# Patient Record
Sex: MALE | Race: WHITE | NOT HISPANIC OR LATINO | Employment: STUDENT | URBAN - METROPOLITAN AREA
[De-identification: names, ages, dates, MRNs, and addresses within clinical notes are randomized per-mention and may not be internally consistent; named-entity substitution may affect disease eponyms.]

---
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
## 2018-04-12 DIAGNOSIS — F40.10 SOCIAL ANXIETY DISORDER: ICD-10-CM

## 2018-04-12 DIAGNOSIS — F41.1 GAD (GENERALIZED ANXIETY DISORDER): ICD-10-CM

## 2018-04-12 RX ORDER — FLUOXETINE HYDROCHLORIDE 40 MG/1
CAPSULE ORAL
Qty: 30 CAPSULE | Refills: 0 | OUTPATIENT
Start: 2018-04-12

## 2018-05-17 ENCOUNTER — OFFICE VISIT (OUTPATIENT)
Dept: PSYCHIATRY | Facility: CLINIC | Age: 18
End: 2018-05-17
Payer: COMMERCIAL

## 2018-05-17 VITALS
BODY MASS INDEX: 30.19 KG/M2 | HEIGHT: 70 IN | SYSTOLIC BLOOD PRESSURE: 143 MMHG | DIASTOLIC BLOOD PRESSURE: 65 MMHG | WEIGHT: 210.88 LBS | HEART RATE: 113 BPM

## 2018-05-17 DIAGNOSIS — F40.10 SOCIAL ANXIETY DISORDER: ICD-10-CM

## 2018-05-17 DIAGNOSIS — F84.0 AUTISM SPECTRUM DISORDER REQUIRING SUPPORT (LEVEL 1): Primary | ICD-10-CM

## 2018-05-17 DIAGNOSIS — F41.1 GAD (GENERALIZED ANXIETY DISORDER): ICD-10-CM

## 2018-05-17 PROCEDURE — 99999 PR PBB SHADOW E&M-EST. PATIENT-LVL II: CPT | Mod: PBBFAC,,, | Performed by: PSYCHIATRY & NEUROLOGY

## 2018-05-17 PROCEDURE — 99214 OFFICE O/P EST MOD 30 MIN: CPT | Mod: S$GLB,,, | Performed by: PSYCHIATRY & NEUROLOGY

## 2018-05-17 RX ORDER — FLUOXETINE HYDROCHLORIDE 40 MG/1
40 CAPSULE ORAL DAILY
Qty: 30 CAPSULE | Refills: 3 | Status: SHIPPED | OUTPATIENT
Start: 2018-05-17 | End: 2018-05-17 | Stop reason: SDUPTHER

## 2018-05-17 RX ORDER — LISDEXAMFETAMINE DIMESYLATE CAPSULES 20 MG/1
20 CAPSULE ORAL EVERY MORNING
Qty: 30 CAPSULE | Refills: 0 | Status: SHIPPED | OUTPATIENT
Start: 2018-05-17 | End: 2018-05-17 | Stop reason: SDUPTHER

## 2018-05-17 RX ORDER — FLUOXETINE HYDROCHLORIDE 40 MG/1
40 CAPSULE ORAL DAILY
Qty: 30 CAPSULE | Refills: 3 | Status: SHIPPED | OUTPATIENT
Start: 2018-05-17 | End: 2018-06-18 | Stop reason: SDUPTHER

## 2018-05-17 RX ORDER — LISDEXAMFETAMINE DIMESYLATE CAPSULES 20 MG/1
20 CAPSULE ORAL EVERY MORNING
Qty: 30 CAPSULE | Refills: 0 | Status: SHIPPED | OUTPATIENT
Start: 2018-06-16 | End: 2018-06-18 | Stop reason: SDUPTHER

## 2018-05-17 RX ORDER — LISDEXAMFETAMINE DIMESYLATE CAPSULES 20 MG/1
20 CAPSULE ORAL EVERY MORNING
Qty: 30 CAPSULE | Refills: 0 | Status: SHIPPED | OUTPATIENT
Start: 2018-06-16 | End: 2018-05-17 | Stop reason: SDUPTHER

## 2018-05-17 RX ORDER — LISDEXAMFETAMINE DIMESYLATE CAPSULES 20 MG/1
20 CAPSULE ORAL EVERY MORNING
Qty: 30 CAPSULE | Refills: 0 | Status: SHIPPED | OUTPATIENT
Start: 2018-05-17 | End: 2018-06-16

## 2018-05-17 NOTE — PROGRESS NOTES
"Outpatient Psychiatry Follow-Up Visit (MD/NP)    5/17/2018    Clinical Status of Patient:  Outpatient (Ambulatory)  IDENTIFYING DATA:  Child's Name: "Preethi Celaya III  Grade: freshmen year at [employer] in 2017-18 academic year  School: [employer]  currently lives with: mother Caroline Saavedra, older sisterRosa lives in the same house, but separate apartment and father and grandparents live elsewhere. All are involved in Leanna's care.Leanna was living in [place] for his first year of [employer] and has returned home for the summer.     Chief Complaint: Royal Yomi VAIL is a 18 y.o. male who presents today for follow-up of Problems with executive functioning and task completion, reading social cues and social relatedness, problems accepting personal responsibility, depressive and anxiety symptoms, R/o high-functioning Autism Spectrum Disorder. Met with patient and mother.    Interval History and Content of Current Session:  Interim Events/Subjective Report/Content of Current Session:     Psychotherapy:  · Target symptoms: distractability, lack of focus, anxiety , Problems with executive functioning and task completion, reading social cues and social relatedness, problems accepting personal responsibility, R/o high-functioning Autism Spectrum Disorder  · Why chosen therapy is appropriate versus another modality: relevant to diagnosis, patient responds to this modality, evidence based practice  · Outcome monitoring methods: self-report, observation, feedback from family  · Therapeutic intervention type: behavior modifying psychotherapy, supportive psychotherapy, medication management  · Topics discussed/themes: relationships difficulties, parenting issues, difficulty managing affect in interpersonal relationships, building skills sets for symptom management, symptom recognition, life stage transitional issues  · The patient's response to the intervention is accepting. The patient's progress " "toward treatment goals is good.   · Duration of intervention: 30 minutes.     Review of Systems   · PSYCHIATRIC: Pertinant items are noted in the narrative.  · CONSTITUTIONAL: No weight gain or loss.   · MUSCULOSKELETAL: No pain or stiffness of the joints.  · NEUROLOGIC: No weakness, sensory changes, seizures, confusion, memory loss, tremor or other abnormal movements.  · CARDIOVASCULAR: No tachycardia or chest pain.  · GASTROINTESTINAL: No nausea, vomiting, pain, constipation or diarrhea.     Past Medical, Family and Social History: The patient's past medical, family and social history have been reviewed and updated as appropriate within the electronic medical record - see encounter notes.     Compliance: yes     Side effects: None     Risk Parameters:  Patient reports no suicidal ideation  Patient reports no homicidal ideation  Patient reports no self-injurious behavior  Patient reports no violent behavior      Exam (detailed: at least 9 elements; comprehensive: all 15 elements)   Constitutional  Vitals:  Most recent vital signs, dated less than 90 days prior to this appointment, were reviewed.   Vitals:    05/17/18 1301   BP: (!) 143/65   Pulse: (!) 113   Weight: 95.7 kg (210 lb 13.9 oz)   Height: 5' 10" (1.778 m)        General:  unremarkable, age appropriate, casually dressed     Musculoskeletal  Muscle Strength/Tone:  no dyskinesia, no tremor, no tic   Gait & Station:  non-ataxic      Psychiatric  Speech:  no latency; no press, spontaneous   Mood & Affect:  euthymic  congruent and appropriate   Thought Process:  goal-directed   Associations:  intact   Thought Content:  normal, no suicidality, no homicidality, delusions, or paranoia   Insight:  has awareness of illness   Judgement: behavior is adequate to circumstances   Orientation:  grossly intact   Memory: intact for content of interview   Language: grossly intact   Attention Span & Concentration:  able to focus   Fund of Knowledge:  intact and appropriate " to age and level of education      Assessment and Diagnosis   Status/Progress: Based on the examination today, the patient's problem(s) is/are adequately but not ideally controlled.  New problems have not been presented today.   Co-morbidities, Diagnostic uncertainty and Lack of compliance are not complicating management of the primary condition.  There are no active rule-out diagnoses for this patient at this time.      General Impression: 17 yo male with ASD and executive functioning problems and task completion, reading social cues and social relatedness, problems accepting personal responsibility, depressive and anxiety symptoms      ICD-10-CM ICD-9-CM   1. Autism spectrum disorder requiring support (level 1) F84.0 299.00   2. HARITHA (generalized anxiety disorder) F41.1 300.02   3. Social anxiety disorder F40.10 300.23       Intervention/Counseling/Treatment Plan   · Medication Management: Continue current medications of Prozac 40 mg and Vyvanse now 20 mg daily.The risks and benefits of medication were discussed with the patient.  · Counseling provided with patient and caregiver as follows: importance of compliance with chosen treatment options was emphasized, risks and benefits of treatment options, including medications, were discussed with the patient    Return to Clinic: as scheduled, 6/18/18

## 2018-05-17 NOTE — LETTER
May 29, 2018      Maite Franks MD  [address]  [facility] [address]           [facility]  [address]  [facility] [address]  Phone: [phone]          Patient: Royal Yomi VAIL   MR Number: [id number]   YOB: 2000   Date of Visit: 5/17/2018       Dear Dr. Maite Franks:    Thank you for referring Royal Celaya to me for evaluation. Attached you will find relevant portions of my assessment and plan of care.    If you have questions, please do not hesitate to call me. I look forward to following Royal Celaya along with you.    Sincerely,    Zoe Rocha MD    Enclosure  CC:  No Recipients    If you would like to receive this communication electronically, please contact [email or web address] or [phone] to request more information on [employer] access.    For providers and/or their staff who would like to refer a patient to [facility], please contact us through our one-stop-shop provider referral line, [facility], at [phone].    If you feel you have received this communication in error or would no longer like to receive these types of communications, please e-mail [email or web address]

## 2018-06-14 NOTE — PROGRESS NOTES
"Outpatient Psychiatry Follow-Up Visit (MD/NP)    6/18/2018    Clinical Status of Patient:  Outpatient (Ambulatory)  IDENTIFYING DATA:that caused him   Child's Name: "Preethi Celaya III  Grade: sophmore year at [employer] in 2018-19 academic year  School: [email or web address]  currently lives with: mother Caroline Saavedra, older sisterRosa lives in the same house, but separate apartment and father and grandparents live elsewhere. All are involved in Leanna's care.Leanna was living in [place] for his first year of [employer] and has returned home for the summer.     Chief Complaint: Royal Yomi VAIL is a 18 y.o. male who presents today for follow-up of Problems with executive functioning and task completion, reading social cues and social relatedness, problems accepting personal responsibility, depressive and anxiety symptoms, R/o high-functioning Autism Spectrum Disorder. Met with patient and mother.    Interval History and Content of Current Session:  Interim Events/Subjective Report/Content of Current Session: Leanna arrives on time and accompanied by his mother who does not join us for today's session. We continue our discussion of the two incidents with women which caused him some anxiety.     Psychotherapy:  · Target symptoms: distractability, lack of focus, anxiety , Problems with executive functioning and task completion, reading social cues and social relatedness, problems accepting personal responsibility, R/o high-functioning Autism Spectrum Disorder  · Why chosen therapy is appropriate versus another modality: relevant to diagnosis, patient responds to this modality, evidence based practice  · Outcome monitoring methods: self-report, observation, feedback from family  · Therapeutic intervention type: behavior modifying psychotherapy, supportive psychotherapy, medication management  · Topics discussed/themes: relationships difficulties, parenting issues, difficulty managing affect in " "interpersonal relationships, building skills sets for symptom management, symptom recognition, life stage transitional issues  · The patient's response to the intervention is accepting. The patient's progress toward treatment goals is good.   · Duration of intervention: 30 minutes.     Review of Systems   · PSYCHIATRIC: Pertinant items are noted in the narrative.  · CONSTITUTIONAL: No weight gain or loss.   · MUSCULOSKELETAL: No pain or stiffness of the joints.  · NEUROLOGIC: No weakness, sensory changes, seizures, confusion, memory loss, tremor or other abnormal movements.  · CARDIOVASCULAR: No tachycardia or chest pain.  · GASTROINTESTINAL: No nausea, vomiting, pain, constipation or diarrhea.     Past Medical, Family and Social History: The patient's past medical, family and social history have been reviewed and updated as appropriate within the electronic medical record - see encounter notes.     Compliance: yes     Side effects: None     Risk Parameters:  Patient reports no suicidal ideation  Patient reports no homicidal ideation  Patient reports no self-injurious behavior  Patient reports no violent behavior      Exam (detailed: at least 9 elements; comprehensive: all 15 elements)   Constitutional  Vitals:  Most recent vital signs, dated less than 90 days prior to this appointment, were reviewed.   Vitals:    06/18/18 1628   BP: (!) 147/70   Pulse: 106   Weight: 99.9 kg (220 lb 3.8 oz)   Height: 5' 10" (1.778 m)        General:  unremarkable, age appropriate, casually dressed     Musculoskeletal  Speech:  no latency; no press, spontaneous   Mood & Affect:  euthymic  congruent and appropriate   Thought Process:  goal-directed   Associations:  intact   Thought Content:  normal, no suicidality, no homicidality, delusions, or paranoia   Insight:  has awareness of illness   Judgement: behavior is adequate to circumstances   Orientation:  grossly intact   Memory: intact for content of interview   Language: grossly " intact   Attention Span & Concentration:  able to focus   Fund of Knowledge:  intact and appropriate to age and level of education      Assessment and Diagnosis   Status/Progress: Based on the examination today, the patient's problems are adequately but not ideally controlled.  New problems have not been presented today.   Co-morbidities, Diagnostic uncertainty and Lack of compliance are not complicating management of the primary condition.  There are no active rule-out diagnoses for this patient at this time.      General Impression: 19 yo male with ASD and executive functioning problems and task completion, reading social cues and social relatedness, problems accepting personal responsibility, depressive and anxiety symptoms      ICD-10-CM ICD-9-CM   1. ADHD (attention deficit hyperactivity disorder), inattentive type F90.0 314.00   2. HARITHA (generalized anxiety disorder) F41.1 300.02   3. Social anxiety disorder F40.10 300.23   4. Autism spectrum disorder requiring support (level 1) F84.0 299.00       Intervention/Counseling/Treatment Plan    Medication Management: Continue current medications of Prozac 40 mg and Vyvanse now 20 mg daily.The risks and benefits of medication were discussed with the patient.   Counseling provided with patient and caregiver as follows: importance of compliance with chosen treatment options was emphasized, risks and benefits of treatment options, including medications, were discussed with the patient    Return to Clinic: as scheduled, 7/31/18 at 10:30 am.

## 2018-06-18 ENCOUNTER — OFFICE VISIT (OUTPATIENT)
Dept: PSYCHIATRY | Facility: CLINIC | Age: 18
End: 2018-06-18
Payer: COMMERCIAL

## 2018-06-18 VITALS
DIASTOLIC BLOOD PRESSURE: 70 MMHG | SYSTOLIC BLOOD PRESSURE: 147 MMHG | BODY MASS INDEX: 31.53 KG/M2 | HEIGHT: 70 IN | HEART RATE: 106 BPM | WEIGHT: 220.25 LBS

## 2018-06-18 DIAGNOSIS — F90.0 ADHD (ATTENTION DEFICIT HYPERACTIVITY DISORDER), INATTENTIVE TYPE: Primary | ICD-10-CM

## 2018-06-18 DIAGNOSIS — F40.10 SOCIAL ANXIETY DISORDER: ICD-10-CM

## 2018-06-18 DIAGNOSIS — F84.0 AUTISM SPECTRUM DISORDER REQUIRING SUPPORT (LEVEL 1): ICD-10-CM

## 2018-06-18 DIAGNOSIS — F41.1 GAD (GENERALIZED ANXIETY DISORDER): ICD-10-CM

## 2018-06-18 PROCEDURE — 99214 OFFICE O/P EST MOD 30 MIN: CPT | Mod: S$GLB,,, | Performed by: PSYCHIATRY & NEUROLOGY

## 2018-06-18 PROCEDURE — 99999 PR PBB SHADOW E&M-EST. PATIENT-LVL II: CPT | Mod: PBBFAC,,, | Performed by: PSYCHIATRY & NEUROLOGY

## 2018-06-18 RX ORDER — FLUOXETINE HYDROCHLORIDE 40 MG/1
40 CAPSULE ORAL DAILY
Qty: 30 CAPSULE | Refills: 3 | Status: SHIPPED | OUTPATIENT
Start: 2018-06-18 | End: 2018-08-21 | Stop reason: SDUPTHER

## 2018-06-18 RX ORDER — LISDEXAMFETAMINE DIMESYLATE CAPSULES 20 MG/1
20 CAPSULE ORAL EVERY MORNING
Qty: 30 CAPSULE | Refills: 0 | Status: SHIPPED | OUTPATIENT
Start: 2018-06-18 | End: 2018-06-18 | Stop reason: SDUPTHER

## 2018-06-18 RX ORDER — LISDEXAMFETAMINE DIMESYLATE CAPSULES 20 MG/1
20 CAPSULE ORAL EVERY MORNING
Qty: 30 CAPSULE | Refills: 0 | Status: SHIPPED | OUTPATIENT
Start: 2018-07-17 | End: 2018-08-16

## 2018-06-18 NOTE — LETTER
June 25, 2018      Maite Franks MD  [address]  [facility] [address]           [facility]  [address]  [facility] [address]  Phone: [phone]          Patient: Royal Yomi VAIL   MR Number: [id number]   YOB: 2000   Date of Visit: 6/18/2018       Dear Dr. Maite Franks:    Thank you for referring Royal Celaya to me for evaluation. Attached you will find relevant portions of my assessment and plan of care.    If you have questions, please do not hesitate to call me. I look forward to following Royal Celaya along with you.    Sincerely,    Zoe Rocha MD    Enclosure  CC:  No Recipients    If you would like to receive this communication electronically, please contact [email or web address] or [phone] to request more information on [facility] Link access.    For providers and/or their staff who would like to refer a patient to [facility], please contact us through our one-stop-shop provider referral line, [facility], at [phone].    If you feel you have received this communication in error or would no longer like to receive these types of communications, please e-mail [email or web address]

## 2018-07-31 ENCOUNTER — OFFICE VISIT (OUTPATIENT)
Dept: PSYCHIATRY | Facility: CLINIC | Age: 18
End: 2018-07-31
Payer: COMMERCIAL

## 2018-07-31 VITALS
DIASTOLIC BLOOD PRESSURE: 70 MMHG | HEIGHT: 70 IN | WEIGHT: 230.19 LBS | SYSTOLIC BLOOD PRESSURE: 129 MMHG | HEART RATE: 94 BPM | BODY MASS INDEX: 32.95 KG/M2

## 2018-07-31 DIAGNOSIS — F40.10 SOCIAL ANXIETY DISORDER: ICD-10-CM

## 2018-07-31 DIAGNOSIS — F90.0 ADHD (ATTENTION DEFICIT HYPERACTIVITY DISORDER), INATTENTIVE TYPE: ICD-10-CM

## 2018-07-31 DIAGNOSIS — F84.0 AUTISM SPECTRUM DISORDER REQUIRING SUPPORT (LEVEL 1): Primary | ICD-10-CM

## 2018-07-31 DIAGNOSIS — F41.1 GAD (GENERALIZED ANXIETY DISORDER): ICD-10-CM

## 2018-07-31 DIAGNOSIS — F52.32 ANORGASMIA OF MALE: ICD-10-CM

## 2018-07-31 PROCEDURE — 99999 PR PBB SHADOW E&M-EST. PATIENT-LVL II: CPT | Mod: PBBFAC,,, | Performed by: PSYCHIATRY & NEUROLOGY

## 2018-07-31 PROCEDURE — 99214 OFFICE O/P EST MOD 30 MIN: CPT | Mod: S$GLB,,, | Performed by: PSYCHIATRY & NEUROLOGY

## 2018-07-31 NOTE — LETTER
August 5, 2018      Maite Franks MD  [address]  [facility] [address]           [facility]  [address]  [facility] [address]  Phone: [phone]          Patient: Royal Yomi VAIL   MR Number: [id number]   YOB: 2000   Date of Visit: 7/31/2018       Dear Dr. Maite Franks:    Thank you for referring Royal Celaya to me for evaluation. Attached you will find relevant portions of my assessment and plan of care.    If you have questions, please do not hesitate to call me. I look forward to following Royal Celaya along with you.    Sincerely,    Zoe Rocha MD    Enclosure  CC:  No Recipients    If you would like to receive this communication electronically, please contact [email or web address] or [phone] to request more information on [facility] Link access.    For providers and/or their staff who would like to refer a patient to [facility], please contact us through our one-stop-shop provider referral line, [facility], at [phone].    If you feel you have received this communication in error or would no longer like to receive these types of communications, please e-mail [email or web address]

## 2018-07-31 NOTE — PROGRESS NOTES
"Outpatient Psychiatry Follow-Up Visit (MD/NP)    7/31/2018    Clinical Status of Patient:  Outpatient (Ambulatory)  IDENTIFYING DATA:that caused him   Child's Name: "Preethi Celaya III  Grade: sophmore year at [employer] in 2018-19 academic year  School: [employer]  currently lives with: mother Caroline Saavedra, older sisteroRsa lives in the same house, but separate apartment and father and grandparents live elsewhere. All are involved in Leanna's care.Leanna was living in [place] for his first year of [employer] and has returned home for the summer.     Chief Complaint: Royal Yomi VAIL is a 18 y.o. male who presents today for follow-up of Problems with executive functioning and task completion, reading social cues and social relatedness, problems accepting personal responsibility, depressive and anxiety symptoms, R/o high-functioning Autism Spectrum Disorder. Met with patient and mother.    Interval History and Content of Current Session:  Interim Events/Subjective Report/Content of Current Session:     Date Severity measure for SAD BDI   7/31/18 10/40 9/33             Psychotherapy:  · Target symptoms: distractability, lack of focus, anxiety , Problems with executive functioning and task completion, reading social cues and social relatedness, problems accepting personal responsibility, R/o high-functioning Autism Spectrum Disorder  · Why chosen therapy is appropriate versus another modality: relevant to diagnosis, patient responds to this modality, evidence based practice  · Outcome monitoring methods: self-report, observation, feedback from family  · Therapeutic intervention type: behavior modifying psychotherapy, supportive psychotherapy, medication management  · Topics discussed/themes: relationships difficulties, parenting issues, difficulty managing affect in interpersonal relationships, building skills sets for symptom management, symptom recognition, life stage transitional " "issues  · The patient's response to the intervention is accepting. The patient's progress toward treatment goals is good.   · Duration of intervention: 30 minutes.     Review of Systems   · PSYCHIATRIC: Pertinant items are noted in the narrative.  · CONSTITUTIONAL: No weight gain or loss.   · MUSCULOSKELETAL: No pain or stiffness of the joints.  · NEUROLOGIC: No weakness, sensory changes, seizures, confusion, memory loss, tremor or other abnormal movements.  · CARDIOVASCULAR: No tachycardia or chest pain.  · GASTROINTESTINAL: No nausea, vomiting, pain, constipation or diarrhea.     Past Medical, Family and Social History: The patient's past medical, family and social history have been reviewed and updated as appropriate within the electronic medical record - see encounter notes.     Compliance: yes     Side effects: None     Risk Parameters:  Patient reports no suicidal ideation  Patient reports no homicidal ideation  Patient reports no self-injurious behavior  Patient reports no violent behavior      Exam (detailed: at least 9 elements; comprehensive: all 15 elements)   Constitutional  Vitals:  Most recent vital signs, dated less than 90 days prior to this appointment, were reviewed.   Vitals:    07/31/18 1025   BP: 129/70   Pulse: 94   Weight: 104.4 kg (230 lb 2.6 oz)   Height: 5' 10" (1.778 m)        General:  unremarkable, age appropriate, casually dressed     Musculoskeletal  Muscle Strength/Tone:  no dyskinesia, no tremor, no tic   Gait & Station:  non-ataxic     Psychiatric  Speech:  no latency; no press, spontaneous   Mood & Affect:  euthymic  congruent and appropriate   Thought Process:  goal-directed   Associations:  intact   Thought Content:  normal, no suicidality, no homicidality, delusions, or paranoia   Insight:  has awareness of illness   Judgement: behavior is adequate to circumstances   Orientation:  grossly intact   Memory: intact for content of interview   Language: grossly intact   Attention " Span & Concentration:  able to focus   Fund of Knowledge:  intact and appropriate to age and level of education      Assessment and Diagnosis   Status/Progress: Based on the examination today, the patient's problems are adequately but not ideally controlled.  New problems have not been presented today.   Co-morbidities, Diagnostic uncertainty and Lack of compliance are not complicating management of the primary condition.  There are no active rule-out diagnoses for this patient at this time.      General Impression: 17 yo male with ASD and executive functioning problems and task completion, reading social cues and social relatedness, problems accepting personal responsibility, depressive and anxiety symptoms      ICD-10-CM ICD-9-CM   1. Autism spectrum disorder requiring support (level 1) F84.0 299.00   2. HARITHA (generalized anxiety disorder) F41.1 300.02   3. Social anxiety disorder F40.10 300.23   4. ADHD (attention deficit hyperactivity disorder), inattentive type F90.0 314.00   5. 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  · Counseling provided with patient and caregiver as follows: importance of compliance with chosen treatment options was emphasized, risks and benefits of treatment options, including medications, were discussed with the patient    Return to Clinic: 8/21/18 at 5pm

## 2018-08-20 ENCOUNTER — PATIENT MESSAGE (OUTPATIENT)
Dept: PSYCHIATRY | Facility: CLINIC | Age: 18
End: 2018-08-20

## 2018-08-21 ENCOUNTER — OFFICE VISIT (OUTPATIENT)
Dept: PSYCHIATRY | Facility: CLINIC | Age: 18
End: 2018-08-21
Payer: COMMERCIAL

## 2018-08-21 DIAGNOSIS — F90.0 ADHD (ATTENTION DEFICIT HYPERACTIVITY DISORDER), INATTENTIVE TYPE: ICD-10-CM

## 2018-08-21 DIAGNOSIS — F84.0 AUTISM SPECTRUM DISORDER REQUIRING SUPPORT (LEVEL 1): Primary | ICD-10-CM

## 2018-08-21 DIAGNOSIS — F41.1 GAD (GENERALIZED ANXIETY DISORDER): ICD-10-CM

## 2018-08-21 DIAGNOSIS — F52.32 ANORGASMIA OF MALE: ICD-10-CM

## 2018-08-21 PROCEDURE — 99214 OFFICE O/P EST MOD 30 MIN: CPT | Mod: S$GLB,,, | Performed by: PSYCHIATRY & NEUROLOGY

## 2018-08-21 PROCEDURE — 99999 PR PBB SHADOW E&M-EST. PATIENT-LVL I: CPT | Mod: PBBFAC,,, | Performed by: PSYCHIATRY & NEUROLOGY

## 2018-08-21 RX ORDER — FLUOXETINE HYDROCHLORIDE 40 MG/1
40 CAPSULE ORAL DAILY
Qty: 90 CAPSULE | Refills: 1 | Status: SHIPPED | OUTPATIENT
Start: 2018-08-21 | End: 2020-03-11

## 2018-08-21 RX ORDER — LISDEXAMFETAMINE DIMESYLATE CAPSULES 20 MG/1
20 CAPSULE ORAL EVERY MORNING
Qty: 30 CAPSULE | Refills: 0 | Status: SHIPPED | OUTPATIENT
Start: 2018-08-24 | End: 2018-09-23

## 2018-08-21 RX ORDER — LORAZEPAM 0.5 MG/1
0.5 TABLET ORAL EVERY 4 HOURS PRN
Qty: 60 TABLET | Refills: 1 | Status: SHIPPED | OUTPATIENT
Start: 2018-08-21 | End: 2021-05-17

## 2018-08-21 RX ORDER — LISDEXAMFETAMINE DIMESYLATE CAPSULES 20 MG/1
20 CAPSULE ORAL EVERY MORNING
Qty: 30 CAPSULE | Refills: 0 | Status: SHIPPED | OUTPATIENT
Start: 2018-10-20 | End: 2019-05-16

## 2018-08-21 RX ORDER — LISDEXAMFETAMINE DIMESYLATE CAPSULES 20 MG/1
20 CAPSULE ORAL EVERY MORNING
Qty: 30 CAPSULE | Refills: 0 | Status: SHIPPED | OUTPATIENT
Start: 2018-09-22 | End: 2018-10-22

## 2018-08-21 NOTE — LETTER
August 27, 2018      Maite Franks MD  [address]  [facility] [address]           [facility]  [address]  [facility] [address]  Phone: [phone]          Patient: Royal Yomi VAIL   MR Number: [id number]   YOB: 2000   Date of Visit: 8/21/2018       Dear Dr. Maite Franks:    Thank you for referring Royal Celaya to me for evaluation. Attached you will find relevant portions of my assessment and plan of care.    If you have questions, please do not hesitate to call me. I look forward to following Royal Celaya along with you.    Sincerely,    Zoe Rocha MD    Enclosure  CC:  No Recipients    If you would like to receive this communication electronically, please contact [email or web address] or [phone] to request more information on [facility] Link access.    For providers and/or their staff who would like to refer a patient to [facility], please contact us through our one-stop-shop provider referral line, [facility], at [phone].    If you feel you have received this communication in error or would no longer like to receive these types of communications, please e-mail [email or web address]

## 2018-08-21 NOTE — PROGRESS NOTES
"Outpatient Psychiatry Follow-Up Visit (MD/NP)    8/21/2018    Clinical Status of Patient:  Outpatient (Ambulatory)  IDENTIFYING DATA:   Child's Name: "Preethi Celaya III  Grade: sophmore year at [employer] in 2018-19 academic year  School: [employer]  currently lives with: mother Caroline Saavedra, older sisterRosa lives in the same house, but separate apartment and father and grandparents live elsewhere. All are involved in Leanna's care.Leanna was living in [place] for his first year of [employer] and has returned home for the summer.     Chief Complaint: Royal Yomi VAIL is a 18 y.o. male who presents today for follow-up of Problems with executive functioning and task completion, reading social cues and social relatedness, problems accepting personal responsibility, depressive and anxiety symptoms, R/o high-functioning Autism Spectrum Disorder. Met with patient and mother      Interval History and Content of Current Session:  Interim Events/Subjective Report/Content of Current Session: Leanna arrives on time and accompanied by his mother who joins us for the session. Leanna had sent me a form requesting accommodations for a single dorm room for this academic year which I completed. We also discussed a recent episode of performance anxiey that he experienced when he was playing a solo in a performance and felt that he wasn't given sufficient time to prepare. He was refusing to perform the morning of hte performance and his mother called hi father who actually has his own anxiety problems and had some Ativan 0.5 mg on had and administered a dose to Leanna in the early afternoon and then shortly before the performance. His mother reports that the performance went off flawlessly. Alfredo is currently having a lot of anxiety about his ability to get a seat in the performance of [employer] in the performance at the [employer] this fall. He is concerned that even though his been " practicing madly for the opportunity to get selected that he wiol be overlooked due to political considerations that would provide the opportunity to upper classman or professorial favorites. We discuss how politics is part of human nature and  is something we all deal with.We caution Leanna that his negative thinking concerning his possibilities of achieving this opportunity could negatively impact his functioning. Since Leanna has had a positive experience is using Ativan at the 0.5 mg dose we will prescribe this medications for his use in similar situations. We caution Leanna and his mother about the addictive potential of Ativan and that it should be used sparingly in cases of panic attacks and severe performance anxiety. Leanna will be returning to [place] on Friday and we have sent a 3 month supply of his prescriptions to the [facility] on [address]. Leanna also reported that he did not get a call from urology concerning a consultation secondary to anorgasmia and asks if I can make a referral recommendation in [facility]. I do not know any urologist personally, but I can either refer him to a urologist at [facility] or [facility] and he would have to see if his insurance cover the provider. Leanna also reports he gets his primary healthcare through [facility] and I said I would look into making a referral to a urologist there if they had specialty practice providers.I attempted to make a consultation appointment in [place] for Leanna. However I was told that [facility] only takes internal referrals.The phone mumber there to make an appointment is [phone].There are also urologists at [facility],  Dr. Eric Rasmussen  [phone]  and at [facility] and Silverio Simeon -[phone]. Both providers take [facility] and Dr. Simeon's next appointment is Sept. 17th and you can make the appointment online  ([email or web address]).      Psychotherapy:  · Target symptoms: distractability, lack of focus, anxiety , Problems with executive functioning and task completion, reading social cues and social relatedness, problems accepting personal responsibility, R/o high-functioning Autism Spectrum Disorder  · Why chosen therapy is appropriate versus another modality: relevant to diagnosis, patient responds to this modality, evidence based practice  · Outcome monitoring methods: self-report, observation, feedback from family  · Therapeutic intervention type: behavior modifying psychotherapy, supportive psychotherapy, medication management  · Topics discussed/themes: relationships difficulties, parenting issues, difficulty managing affect in interpersonal relationships, building skills sets for symptom management, symptom recognition, life stage transitional issues  · The patient's response to the intervention is accepting. The patient's progress toward treatment goals is good.   · Duration of intervention: 30 minutes.     Review of Systems   · PSYCHIATRIC: Pertinant items are noted in the narrative.  · CONSTITUTIONAL: No weight gain or loss.   · MUSCULOSKELETAL: No pain or stiffness of the joints.  · NEUROLOGIC: No weakness, sensory changes, seizures, confusion, memory loss, tremor or other abnormal movements.  · CARDIOVASCULAR: No tachycardia or chest pain.  · GASTROINTESTINAL: No nausea, vomiting, pain, constipation or diarrhea.     Past Medical, Family and Social History: The patient's past medical, family and social history have been reviewed and updated as appropriate within the electronic medical record - see encounter notes.     Compliance: yes     Side effects: None     Risk Parameters:  Patient reports no suicidal ideation  Patient reports no homicidal ideation  Patient reports no self-injurious behavior  Patient  reports no violent behavior      Exam (detailed: at least 9 elements; comprehensive: all 15 elements)   Constitutional  Vitals:  Most recent vital signs, dated less than 90 days prior to this appointment, were reviewed.   There were no vitals filed for this visit.     General:  unremarkable, age appropriate, casually dressed     Musculoskeletal  Muscle Strength/Tone:  no dyskinesia, no tremor, no tic   Gait & Station:  non-ataxic      Psychiatric  Speech:  no latency; no press, spontaneous   Mood & Affect:  euthymic  congruent and appropriate   Thought Process:  goal-directed   Associations:  intact   Thought Content:  normal, no suicidality, no homicidality, delusions, or paranoia   Insight:  has awareness of illness   Judgement: behavior is adequate to circumstances   Orientation:  grossly intact   Memory: intact for content of interview   Language: grossly intact   Attention Span & Concentration:  able to focus   Fund of Knowledge:  intact and appropriate to age and level of education      Assessment and Diagnosis   Status/Progress: Based on the examination today, the patient's problems are adequately but not ideally controlled.  New problems have not been presented today.   Co-morbidities, Diagnostic uncertainty and Lack of compliance are not complicating management of the primary condition.  There are no active rule-out diagnoses for this patient at this time.      General Impression: 17 yo male with ASD and executive functioning problems and task completion, reading social cues and social relatedness, problems accepting personal responsibility, depressive and anxiety symptoms      ICD-10-CM ICD-9-CM   1. Autism spectrum disorder requiring support (level 1) F84.0 299.00   2. HARITHA (generalized anxiety disorder) F41.1 300.02   3. ADHD (attention deficit hyperactivity disorder), inattentive type F90.0 314.00   4. 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  · Referral to [facility] urology for anorgasmia.  · Counseling provided with patient and caregiver as follows: importance of compliance with chosen treatment options was emphasized, risks and benefits of treatment options, including medications, were discussed with the patient    Return to Clinic: 3 months

## 2018-08-23 ENCOUNTER — PATIENT MESSAGE (OUTPATIENT)
Dept: PSYCHIATRY | Facility: CLINIC | Age: 18
End: 2018-08-23

## 2018-12-12 ENCOUNTER — PATIENT MESSAGE (OUTPATIENT)
Dept: INTERNAL MEDICINE | Facility: CLINIC | Age: 18
End: 2018-12-12

## 2019-02-27 ENCOUNTER — TELEPHONE (OUTPATIENT)
Dept: ADMINISTRATIVE | Facility: HOSPITAL | Age: 19
End: 2019-02-27

## 2019-02-27 DIAGNOSIS — Z00.00 ANNUAL PHYSICAL EXAM: Primary | ICD-10-CM

## 2019-03-04 ENCOUNTER — OFFICE VISIT (OUTPATIENT)
Dept: OPTOMETRY | Facility: CLINIC | Age: 19
End: 2019-03-04
Payer: COMMERCIAL

## 2019-03-04 ENCOUNTER — LAB VISIT (OUTPATIENT)
Dept: LAB | Facility: HOSPITAL | Age: 19
End: 2019-03-04
Attending: INTERNAL MEDICINE
Payer: COMMERCIAL

## 2019-03-04 DIAGNOSIS — H43.393 VISUAL FLOATERS, BILATERAL: Primary | ICD-10-CM

## 2019-03-04 DIAGNOSIS — H52.12 MYOPIA WITH ASTIGMATISM, LEFT: ICD-10-CM

## 2019-03-04 DIAGNOSIS — H52.202 MYOPIA WITH ASTIGMATISM, LEFT: ICD-10-CM

## 2019-03-04 DIAGNOSIS — Z00.00 ANNUAL PHYSICAL EXAM: ICD-10-CM

## 2019-03-04 DIAGNOSIS — H52.11 MYOPIA, RIGHT EYE: ICD-10-CM

## 2019-03-04 LAB
ALBUMIN SERPL BCP-MCNC: 4.4 G/DL
ALP SERPL-CCNC: 63 U/L
ALT SERPL W/O P-5'-P-CCNC: 67 U/L
ANION GAP SERPL CALC-SCNC: 8 MMOL/L
AST SERPL-CCNC: 33 U/L
BASOPHILS # BLD AUTO: 0.04 K/UL
BASOPHILS NFR BLD: 0.5 %
BILIRUB SERPL-MCNC: 0.4 MG/DL
BUN SERPL-MCNC: 13 MG/DL
CALCIUM SERPL-MCNC: 9.7 MG/DL
CHLORIDE SERPL-SCNC: 105 MMOL/L
CHOLEST SERPL-MCNC: 152 MG/DL
CHOLEST/HDLC SERPL: 3.2 {RATIO}
CO2 SERPL-SCNC: 25 MMOL/L
CREAT SERPL-MCNC: 0.8 MG/DL
DIFFERENTIAL METHOD: ABNORMAL
EOSINOPHIL # BLD AUTO: 0.2 K/UL
EOSINOPHIL NFR BLD: 2.2 %
ERYTHROCYTE [DISTWIDTH] IN BLOOD BY AUTOMATED COUNT: 13.4 %
EST. GFR  (AFRICAN AMERICAN): >60 ML/MIN/1.73 M^2
EST. GFR  (NON AFRICAN AMERICAN): >60 ML/MIN/1.73 M^2
ESTIMATED AVG GLUCOSE: 100 MG/DL
GLUCOSE SERPL-MCNC: 87 MG/DL
HBA1C MFR BLD HPLC: 5.1 %
HCT VFR BLD AUTO: 42.9 %
HDLC SERPL-MCNC: 48 MG/DL
HDLC SERPL: 31.6 %
HGB BLD-MCNC: 13.9 G/DL
LDLC SERPL CALC-MCNC: 87.4 MG/DL
LYMPHOCYTES # BLD AUTO: 1.9 K/UL
LYMPHOCYTES NFR BLD: 25.6 %
MCH RBC QN AUTO: 27.8 PG
MCHC RBC AUTO-ENTMCNC: 32.4 G/DL
MCV RBC AUTO: 86 FL
MONOCYTES # BLD AUTO: 0.6 K/UL
MONOCYTES NFR BLD: 8.6 %
NEUTROPHILS # BLD AUTO: 4.4 K/UL
NEUTROPHILS NFR BLD: 60.2 %
NONHDLC SERPL-MCNC: 104 MG/DL
PLATELET # BLD AUTO: 316 K/UL
PMV BLD AUTO: 9.6 FL
POTASSIUM SERPL-SCNC: 4.2 MMOL/L
PROT SERPL-MCNC: 7.2 G/DL
RBC # BLD AUTO: 5 M/UL
SODIUM SERPL-SCNC: 138 MMOL/L
TRIGL SERPL-MCNC: 83 MG/DL
TSH SERPL DL<=0.005 MIU/L-ACNC: 1.1 UIU/ML
WBC # BLD AUTO: 7.3 K/UL

## 2019-03-04 PROCEDURE — 99999 PR PBB SHADOW E&M-EST. PATIENT-LVL III: CPT | Mod: PBBFAC,,, | Performed by: OPTOMETRIST

## 2019-03-04 PROCEDURE — 80053 COMPREHEN METABOLIC PANEL: CPT

## 2019-03-04 PROCEDURE — 92015 PR REFRACTION: ICD-10-PCS | Mod: S$GLB,,, | Performed by: OPTOMETRIST

## 2019-03-04 PROCEDURE — 92015 DETERMINE REFRACTIVE STATE: CPT | Mod: S$GLB,,, | Performed by: OPTOMETRIST

## 2019-03-04 PROCEDURE — 36415 COLL VENOUS BLD VENIPUNCTURE: CPT

## 2019-03-04 PROCEDURE — 83036 HEMOGLOBIN GLYCOSYLATED A1C: CPT

## 2019-03-04 PROCEDURE — 85025 COMPLETE CBC W/AUTO DIFF WBC: CPT

## 2019-03-04 PROCEDURE — 99999 PR PBB SHADOW E&M-EST. PATIENT-LVL III: ICD-10-PCS | Mod: PBBFAC,,, | Performed by: OPTOMETRIST

## 2019-03-04 PROCEDURE — 92014 COMPRE OPH EXAM EST PT 1/>: CPT | Mod: S$GLB,,, | Performed by: OPTOMETRIST

## 2019-03-04 PROCEDURE — 80061 LIPID PANEL: CPT

## 2019-03-04 PROCEDURE — 84443 ASSAY THYROID STIM HORMONE: CPT

## 2019-03-04 PROCEDURE — 92014 PR EYE EXAM, EST PATIENT,COMPREHESV: ICD-10-PCS | Mod: S$GLB,,, | Performed by: OPTOMETRIST

## 2019-03-04 RX ORDER — PROPRANOLOL HYDROCHLORIDE 40 MG/1
40 TABLET ORAL DAILY
COMMUNITY
End: 2021-05-17 | Stop reason: SDUPTHER

## 2019-03-04 NOTE — PROGRESS NOTES
HPI     Mr. Royal Yomi VAIL is here for a comprehensive eye exam.    Patient complains of blurry vision OU for distance without correction. Pt   broke his glasses and hasn't been wearing any for a few months. He   requests refraction.    Pt also complains of seeing floaters OU for the past 1 month. No recent   head or eye trauma.     (+)drops: OTC red eye relief drops  (-)flashes  (+)floaters OU x 1 month  (-)diplopia    (-)Diabetes       OCULAR HISTORY  Last Eye Exam: 02/03/17 with Dr. Weeks  (-)eye surgery   (-)diagnosed or treated for any eye conditions or diseases    FAMILY HISTORY  (-)Glaucoma         Last edited by Gifty Weeks, OD on 3/4/2019  8:48 AM. (History)            Assessment /Plan     For exam results, see Encounter Report.    Visual floaters, bilateral   No retinal breaks/detachments OU. Reviewed signs and symptoms of a retinal detachment, pt understands to return to clinic immediately with any new floaters, flashes of light, or a veil over vision.      Myopia, right eye  Myopia with astigmatism, left   Increase OS>OD. New glasses prescription released, adaptation expected.  Recommended removing glasses during sustained near tasks.  Eyeglass Final Rx     Eyeglass Final Rx       Sphere Cylinder [place]orte Dist [facility]    Right -1.25 Sphere  20/20    Left -2.25 +0.75 100 20/20    Type:  SVL    Expiration Date:  3/4/2020                  RTC 1 year

## 2019-03-04 NOTE — PATIENT INSTRUCTIONS
FLASHES AND FLOATERS    You've noticed something new in your field of vision, possibly one or more movable spots or squiggles that you can't remember seeing before. Here are some points to follow and some information about this condition:    Located in the hollow interior of the eye is a transparent gel called the VITREOUS. It is composed of microscopic fibers, large molecules, and fluid-like water.  Most people also have small pieces of material suspended within the vitreous, which under ideal lighting conditions will cast a shadow on the retina and become visible as moving spots, which are called vitreous floaters. Floaters become more common with age.    During life the vitreous fibers condense together and the gel becomes more fluid. Fibrous clumps can form which may become large enough to be seen as floaters. As the fibers condense the liquid portion escapes, allowing the vitreous to shrink, separate from the retina, and collapse forward in the eye. While shrinking, the vitreous gel can pull against the retina causing episodes of light flashes or arcs of light.  The retina does not have pain sensors, and its only response is flashes of light. People often describe these flashes as lightning streaks or fireworks.     A concern is that traction on the retina from the shrinking vitreous may pull a tear in the retina, which could evolve into a retinal detachment.  Therefore persistent flashes do require urgent evaluation. Other symptoms that require urgent evaluation are a large shadow, curtain, or blank spot in your vision, or a sudden shower of dozens or hundreds of tiny floaters resembling pepper or soot.    To summarize, nearly everyone has floaters, but a sudden shower of dots, persistent light flashes in one eye, or a curtain or shadow in your vision require urgent consultation.  If these occur, please let us know immediately.

## 2019-03-06 ENCOUNTER — TELEPHONE (OUTPATIENT)
Dept: CARDIOLOGY | Facility: CLINIC | Age: 19
End: 2019-03-06

## 2019-03-06 DIAGNOSIS — R00.2 PALPITATIONS: ICD-10-CM

## 2019-03-06 DIAGNOSIS — R00.2 PALPITATION: Primary | ICD-10-CM

## 2019-03-07 ENCOUNTER — HOSPITAL ENCOUNTER (OUTPATIENT)
Dept: CARDIOLOGY | Facility: CLINIC | Age: 19
Discharge: HOME | End: 2019-03-07
Payer: COMMERCIAL

## 2019-03-07 ENCOUNTER — OFFICE VISIT (OUTPATIENT)
Dept: CARDIOLOGY | Facility: CLINIC | Age: 19
End: 2019-03-07
Payer: COMMERCIAL

## 2019-03-07 VITALS
OXYGEN SATURATION: 98 % | HEART RATE: 74 BPM | HEIGHT: 70 IN | SYSTOLIC BLOOD PRESSURE: 126 MMHG | BODY MASS INDEX: 33.17 KG/M2 | DIASTOLIC BLOOD PRESSURE: 83 MMHG | WEIGHT: 231.69 LBS

## 2019-03-07 DIAGNOSIS — I45.10 INCOMPLETE RBBB: ICD-10-CM

## 2019-03-07 DIAGNOSIS — F41.0 PANIC ATTACKS: ICD-10-CM

## 2019-03-07 DIAGNOSIS — R00.2 PALPITATION: ICD-10-CM

## 2019-03-07 DIAGNOSIS — R01.1 UNDIAGNOSED CARDIAC MURMURS: Primary | ICD-10-CM

## 2019-03-07 PROCEDURE — 3008F PR BODY MASS INDEX (BMI) DOCUMENTED: ICD-10-PCS | Mod: CPTII,S$GLB,, | Performed by: INTERNAL MEDICINE

## 2019-03-07 PROCEDURE — 99999 PR PBB SHADOW E&M-EST. PATIENT-LVL IV: CPT | Mod: PBBFAC,,, | Performed by: INTERNAL MEDICINE

## 2019-03-07 PROCEDURE — 99204 PR OFFICE/OUTPT VISIT, NEW, LEVL IV, 45-59 MIN: ICD-10-PCS | Mod: S$GLB,,, | Performed by: INTERNAL MEDICINE

## 2019-03-07 PROCEDURE — 3008F BODY MASS INDEX DOCD: CPT | Mod: CPTII,S$GLB,, | Performed by: INTERNAL MEDICINE

## 2019-03-07 PROCEDURE — 99204 OFFICE O/P NEW MOD 45 MIN: CPT | Mod: S$GLB,,, | Performed by: INTERNAL MEDICINE

## 2019-03-07 PROCEDURE — 93000 ELECTROCARDIOGRAM COMPLETE: CPT | Mod: S$GLB,,, | Performed by: INTERNAL MEDICINE

## 2019-03-07 PROCEDURE — 99999 PR PBB SHADOW E&M-EST. PATIENT-LVL IV: ICD-10-PCS | Mod: PBBFAC,,, | Performed by: INTERNAL MEDICINE

## 2019-03-07 PROCEDURE — 93000 EKG 12-LEAD: ICD-10-PCS | Mod: S$GLB,,, | Performed by: INTERNAL MEDICINE

## 2019-03-07 RX ORDER — BUSPIRONE HYDROCHLORIDE 5 MG/1
5 TABLET ORAL 2 TIMES DAILY
COMMUNITY
End: 2021-05-17 | Stop reason: SDUPTHER

## 2019-03-07 NOTE — PROGRESS NOTES
Cardiology Clinic Note  Reason for Visit: Abnormal EKG    HPI:   Mr. Royal Yomi VAIL is a 19 y.o. gentleman with history of ADHD, Autism spectrum disorder, Generalized anxiety disorder who presents for initial visit for abnormal EKG. 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 He was observed overnight for the first visit and watched in ED for couple of hrs for the next visit. His panic attack is associated with chest pain described as substernal, no radiation, has some sharp features not associated with diaphoresis but associated with SOB and palpitations. During one of such attacks he was able to rest and it resolved, Another attack resolved with ativan. He was started on propranolol by his psychiatrist after the 2nd attack. He is not physically active. He denies smoking, alcohol or drugs. No family history of heart disease. He had a CT PE protocol done on one admission which was negative for PE. Lab work including CBC, chemistries and troponin were also negative. His EKG showed incomplete RBBB with some nonspecific ST changed for which he was referred to cardiology.    ROS:    Review of Systems   Constitution: Negative for decreased appetite and fever.   HENT: Negative for hoarse voice and nosebleeds.    Eyes: Negative for blurred vision and photophobia.   Cardiovascular: Positive for chest pain and palpitations. Negative for dyspnea on exertion, irregular heartbeat and orthopnea.   Respiratory: Negative for cough and shortness of breath.    Hematologic/Lymphatic: Negative for bleeding problem. Does not bruise/bleed easily.   Skin: Negative for itching and rash.   Musculoskeletal: Negative for joint swelling and neck pain.   Gastrointestinal: Negative for abdominal pain, hematemesis, hematochezia, nausea and vomiting.   Genitourinary: Negative for hematuria.   Neurological: Negative for light-headedness and seizures.   Psychiatric/Behavioral: Positive for  hypervigilance. Negative for altered mental status. The patient is nervous/anxious.      PMH:     Past Medical History:   Diagnosis Date    ADHD     Constipation - functional     Multiple nevi      Past Surgical History:   Procedure Laterality Date    TESTICLE SURGERY  2001    hydrocele     Allergies:     Review of patient's allergies indicates:   Allergen Reactions    Pcn [penicillins] Hives     Medications:     Current Outpatient Medications on File Prior to Visit   Medication Sig Dispense Refill    busPIRone (BUSPAR) 5 MG Tab Take 5 mg by mouth 2 (two) times daily.      propranolol (INDERAL) 40 MG tablet Take 40 mg by mouth once daily.       FLUoxetine (PROZAC) 40 MG capsule Take 1 capsule (40 mg total) by mouth once daily. 90 capsule 1    lisdexamfetamine (VYVANSE) 20 MG capsule Take 1 capsule (20 mg total) by mouth every morning. 30 capsule 0    LORazepam (ATIVAN) 0.5 MG tablet Take 1 tablet (0.5 mg total) by mouth every 4 (four) hours as needed for Anxiety. 60 tablet 1     No current facility-administered medications on file prior to visit.      Social History:     Social History     Tobacco Use    Smoking status: Passive Smoke Exposure - Never Smoker    Smokeless tobacco: Never Used    Tobacco comment: Dad smokes a pipe   Substance Use Topics    Alcohol use: Yes     Comment: occasional     Family History:     Family History   Problem Relation Age of Onset    Nevi Father     Hyperlipidemia Father     Hypertension Father     Mental illness Father     Asthma Sister     Blindness Maternal Grandmother     Glaucoma Maternal Grandmother     Alzheimer's disease Maternal Grandmother     Cataracts Maternal Grandfather     Cancer Maternal Grandfather         SKIN CA    Heart disease Paternal Grandmother     Anxiety disorder Paternal Grandfather     Cancer Paternal Grandfather         prostate CA    Amblyopia Neg Hx     Diabetes Neg Hx     Retinal detachment Neg Hx     Strabismus Neg Hx   "    Physical Exam:   /83   Pulse 74   Ht 5' 10" (1.778 m)   Wt 105.1 kg (231 lb 11.3 oz)   SpO2 98%   BMI 33.25 kg/m²      Physical Exam  General: alert, awake and oriented x 3  Eyes:PERRL.   Neck:no JVD   Lungs:  clear to auscultation bilaterally   Cardiovascular: Heart: regular rate and rhythm, S1, S2 normal, Apical 2/6 MSM  Chest Wall: no tenderness.   Pulses-2+ radial, femoral, DP, PT b/l  Extremities: no cyanosis or edema.   Abdomen/Rectal: Abdomen: soft, non-tender non-distented; bowel sounds normal  Neurologic: Normal strength and tone. No focal numbness or weakness  Labs:     Lab Results   Component Value Date     03/04/2019    K 4.2 03/04/2019     03/04/2019    CO2 25 03/04/2019    BUN 13 03/04/2019    CREATININE 0.8 03/04/2019    ANIONGAP 8 03/04/2019     Lab Results   Component Value Date    HGBA1C 5.1 03/04/2019     No results found for: BNP, BNPTRIAGEBLO Lab Results   Component Value Date    WBC 7.30 03/04/2019    HGB 13.9 (L) 03/04/2019    HCT 42.9 03/04/2019     03/04/2019    GRAN 4.4 03/04/2019    GRAN 60.2 03/04/2019     Lab Results   Component Value Date    CHOL 152 03/04/2019    HDL 48 03/04/2019    LDLCALC 87.4 03/04/2019    TRIG 83 03/04/2019              EKG: sinus rhythm with incomplete RBBB   Assessment:     1. Undiagnosed cardiac murmurs    2. Incomplete RBBB    3. Panic attacks        Plan:   -Will get a transthoracic echo due to presence of murmur and incomplete RBBB.  -Recommended to patient to increase physical activity.    Will call with echo results, if normal  RTC prn. Discussed with Dr Swartz.    LEONARD PEREZ  CARDIOLOGY FELLOW, PGY 5  [phone]  "

## 2019-03-11 ENCOUNTER — OFFICE VISIT (OUTPATIENT)
Dept: INTERNAL MEDICINE | Facility: CLINIC | Age: 19
End: 2019-03-11
Payer: COMMERCIAL

## 2019-03-11 ENCOUNTER — HOSPITAL ENCOUNTER (OUTPATIENT)
Dept: CARDIOLOGY | Facility: CLINIC | Age: 19
Discharge: HOME | End: 2019-03-11
Attending: INTERNAL MEDICINE
Payer: COMMERCIAL

## 2019-03-11 ENCOUNTER — LAB VISIT (OUTPATIENT)
Dept: LAB | Facility: HOSPITAL | Age: 19
End: 2019-03-11
Attending: INTERNAL MEDICINE
Payer: COMMERCIAL

## 2019-03-11 VITALS
WEIGHT: 227 LBS | HEIGHT: 70 IN | DIASTOLIC BLOOD PRESSURE: 64 MMHG | SYSTOLIC BLOOD PRESSURE: 114 MMHG | HEART RATE: 75 BPM | BODY MASS INDEX: 32.5 KG/M2

## 2019-03-11 DIAGNOSIS — R01.1 UNDIAGNOSED CARDIAC MURMURS: ICD-10-CM

## 2019-03-11 DIAGNOSIS — Z00.00 ANNUAL PHYSICAL EXAM: Primary | ICD-10-CM

## 2019-03-11 DIAGNOSIS — F41.1 GAD (GENERALIZED ANXIETY DISORDER): ICD-10-CM

## 2019-03-11 DIAGNOSIS — R74.01 ELEVATED ALT MEASUREMENT: ICD-10-CM

## 2019-03-11 DIAGNOSIS — Z00.00 ANNUAL PHYSICAL EXAM: ICD-10-CM

## 2019-03-11 DIAGNOSIS — H61.22 CERUMINOSIS, LEFT: ICD-10-CM

## 2019-03-11 LAB
ALBUMIN SERPL BCP-MCNC: 4.3 G/DL
ALP SERPL-CCNC: 77 U/L
ALT SERPL W/O P-5'-P-CCNC: 58 U/L
ASCENDING AORTA: 2.49 CM
AST SERPL-CCNC: 28 U/L
AV INDEX (PROSTH): 0.95
AV MEAN GRADIENT: 4 MMHG
AV PEAK GRADIENT: 9.61 MMHG
AV VALVE AREA: 3.45 CM2
AV VELOCITY RATIO: 1.04
BASOPHILS # BLD AUTO: 0.06 K/UL
BASOPHILS NFR BLD: 1 %
BILIRUB DIRECT SERPL-MCNC: 0.2 MG/DL
BILIRUB SERPL-MCNC: 0.5 MG/DL
BSA FOR ECHO PROCEDURE: 2.25 M2
CV ECHO LV RWT: 0.36 CM
DIFFERENTIAL METHOD: ABNORMAL
DOP CALC AO PEAK VEL: 1.55 M/S
DOP CALC AO VTI: 30.9 CM
DOP CALC LVOT AREA: 3.63 CM2
DOP CALC LVOT DIAMETER: 2.15 CM
DOP CALC LVOT PEAK VEL: 1.61 M/S
DOP CALC LVOT STROKE VOLUME: 106.61 CM3
DOP CALCLVOT PEAK VEL VTI: 29.38 CM
E WAVE DECELERATION TIME: 173.61 MSEC
E/A RATIO: 1.64
E/E' RATIO: 7.74
ECHO LV POSTERIOR WALL: 0.94 CM (ref 0.6–1.1)
EOSINOPHIL # BLD AUTO: 0.1 K/UL
EOSINOPHIL NFR BLD: 1.5 %
ERYTHROCYTE [DISTWIDTH] IN BLOOD BY AUTOMATED COUNT: 12.9 %
FRACTIONAL SHORTENING: 34 % (ref 28–44)
HCT VFR BLD AUTO: 41.7 %
HGB BLD-MCNC: 13.5 G/DL
IMM GRANULOCYTES # BLD AUTO: 0.03 K/UL
IMM GRANULOCYTES NFR BLD AUTO: 0.5 %
INTERVENTRICULAR SEPTUM: 0.87 CM (ref 0.6–1.1)
IVRT: 0.06 MSEC
LA MAJOR: 5.7 CM
LA MINOR: 5.6 CM
LA WIDTH: 3.6 CM
LEFT ATRIUM SIZE: 3.6 CM
LEFT ATRIUM VOLUME INDEX: 28.3 ML/M2
LEFT ATRIUM VOLUME: 62.24 CM3
LEFT INTERNAL DIMENSION IN SYSTOLE: 3.44 CM (ref 2.1–4)
LEFT VENTRICLE DIASTOLIC VOLUME INDEX: 58.16 ML/M2
LEFT VENTRICLE DIASTOLIC VOLUME: 128.11 ML
LEFT VENTRICLE MASS INDEX: 76.8 G/M2
LEFT VENTRICLE SYSTOLIC VOLUME INDEX: 22.1 ML/M2
LEFT VENTRICLE SYSTOLIC VOLUME: 48.74 ML
LEFT VENTRICULAR INTERNAL DIMENSION IN DIASTOLE: 5.18 CM (ref 3.5–6)
LEFT VENTRICULAR MASS: 169.11 G
LV LATERAL E/E' RATIO: 6.32
LV SEPTAL E/E' RATIO: 10
LYMPHOCYTES # BLD AUTO: 1.4 K/UL
LYMPHOCYTES NFR BLD: 23.3 %
MCH RBC QN AUTO: 27.8 PG
MCHC RBC AUTO-ENTMCNC: 32.4 G/DL
MCV RBC AUTO: 86 FL
MONOCYTES # BLD AUTO: 0.7 K/UL
MONOCYTES NFR BLD: 11.3 %
MV PEAK A VEL: 0.73 M/S
MV PEAK E VEL: 1.2 M/S
NEUTROPHILS # BLD AUTO: 3.8 K/UL
NEUTROPHILS NFR BLD: 62.4 %
NRBC BLD-RTO: 0 /100 WBC
PISA TR MAX VEL: 2.28 M/S
PLATELET # BLD AUTO: 301 K/UL
PMV BLD AUTO: 10.1 FL
PROT SERPL-MCNC: 7.3 G/DL
PULM VEIN S/D RATIO: 0.93
PV PEAK D VEL: 0.67 M/S
PV PEAK S VEL: 0.62 M/S
RA MAJOR: 5.65 CM
RA PRESSURE: 3 MMHG
RA WIDTH: 4.13 CM
RBC # BLD AUTO: 4.85 M/UL
RIGHT VENTRICULAR END-DIASTOLIC DIMENSION: 3.73 CM
RV TISSUE DOPPLER FREE WALL SYSTOLIC VELOCITY 1 (APICAL 4 CHAMBER VIEW): 13.41 M/S
SINUS: 2.94 CM
STJ: 2.33 CM
TDI LATERAL: 0.19
TDI SEPTAL: 0.12
TDI: 0.16
TR MAX PG: 20.79 MMHG
TRICUSPID ANNULAR PLANE SYSTOLIC EXCURSION: 2.81 CM
TV REST PULMONARY ARTERY PRESSURE: 24 MMHG
WBC # BLD AUTO: 6.01 K/UL

## 2019-03-11 PROCEDURE — 85025 COMPLETE CBC W/AUTO DIFF WBC: CPT

## 2019-03-11 PROCEDURE — 93306 TTE W/DOPPLER COMPLETE: CPT | Mod: S$GLB,,, | Performed by: INTERNAL MEDICINE

## 2019-03-11 PROCEDURE — 36415 COLL VENOUS BLD VENIPUNCTURE: CPT

## 2019-03-11 PROCEDURE — 80076 HEPATIC FUNCTION PANEL: CPT

## 2019-03-11 PROCEDURE — 99395 PR PREVENTIVE VISIT,EST,18-39: ICD-10-PCS | Mod: S$GLB,,, | Performed by: INTERNAL MEDICINE

## 2019-03-11 PROCEDURE — 99395 PREV VISIT EST AGE 18-39: CPT | Mod: S$GLB,,, | Performed by: INTERNAL MEDICINE

## 2019-03-11 PROCEDURE — 99999 PR PBB SHADOW E&M-EST. PATIENT-LVL III: CPT | Mod: PBBFAC,,, | Performed by: INTERNAL MEDICINE

## 2019-03-11 PROCEDURE — 99999 PR PBB SHADOW E&M-EST. PATIENT-LVL III: ICD-10-PCS | Mod: PBBFAC,,, | Performed by: INTERNAL MEDICINE

## 2019-03-11 PROCEDURE — 93306 TRANSTHORACIC ECHO (TTE) COMPLETE (CUPID ONLY): ICD-10-PCS | Mod: S$GLB,,, | Performed by: INTERNAL MEDICINE

## 2019-03-11 NOTE — PROGRESS NOTES
INTERNAL MEDICINE ANNUAL VISIT NOTE      CHIEF COMPLAINT     Chief Complaint   Patient presents with    Annual Exam     HPI     Royal Yomi VAIL is a 19 y.o. C male who presents for annual exam.    Autism spectrum d/o, HARITHA, ADHD.   Follows w/ Dr. Rocha - last seen 8/27/18. F/u in 3 mo.  Currently on prozac 40mg daily, ativan 0.5mg QID prn (hasn't had to take this), vyvanse 20mg daily (haven't taken this in a long time), buspar 5mg BID.    Went to ED in [place] in Feb x 2.. Had EKG done that showed  Incomplete RBBB w/ nonspecific ST changes and was referred to cardiology. started on propranolol 40mg daily by psychiatrist. Saw Dr. James Etienne and Dr. Swartz 3/7/19. Ordered echo. EKG reviewed.  Has been taking propranolol 40mg daily. Reports helped w/ palpitations.   However this past Saturday, HR did get higher right before a performance.     Past Medical History:  Past Medical History:   Diagnosis Date    ADHD     Constipation - functional     Multiple nevi        Past Surgical History:  Past Surgical History:   Procedure Laterality Date    TESTICLE SURGERY  2001    hydrocele       Allergies:  Review of patient's allergies indicates:   Allergen Reactions    Pcn [penicillins] Hives       Home Medications:  Prior to Admission medications    Medication Sig Start Date End Date Taking? Authorizing Provider   busPIRone (BUSPAR) 5 MG Tab Take 5 mg by mouth 2 (two) times daily.   Yes Historical Provider, MD   FLUoxetine (PROZAC) 40 MG capsule Take 1 capsule (40 mg total) by mouth once daily. 8/21/18 3/11/19 Yes Zoe Rocha MD   LORazepam (ATIVAN) 0.5 MG tablet Take 1 tablet (0.5 mg total) by mouth every 4 (four) hours as needed for Anxiety. 8/21/18 3/11/19 Yes Zoe Rocha MD   propranolol (INDERAL) 40 MG tablet Take 40 mg by mouth once daily.    Yes Historical Provider, MD   lisdexamfetamine (VYVANSE) 20 MG capsule Take 1 capsule (20 mg total) by mouth every morning. 10/20/18 11/19/18  Zoe Rocha,  MD       Family History:  Family History   Problem Relation Age of Onset    Nevi Father     Hyperlipidemia Father     Hypertension Father     Mental illness Father     Asthma Sister     Blindness Maternal Grandmother     Glaucoma Maternal Grandmother     Alzheimer's disease Maternal Grandmother     Cataracts Maternal Grandfather     Cancer Maternal Grandfather         SKIN CA    Heart disease Paternal Grandmother     Anxiety disorder Paternal Grandfather     Cancer Paternal Grandfather         prostate CA    Amblyopia Neg Hx     Diabetes Neg Hx     Retinal detachment Neg Hx     Strabismus Neg Hx        Social History:  Social History     Tobacco Use    Smoking status: Passive Smoke Exposure - Never Smoker    Smokeless tobacco: Never Used    Tobacco comment: Dad smokes a pipe   Substance Use Topics    Alcohol use: Yes     Comment: occasional    Drug use: No       Review of Systems:  Review of Systems   Constitutional: Negative for activity change and unexpected weight change.   HENT: Negative for hearing loss, rhinorrhea and trouble swallowing.    Eyes: Negative for discharge and visual disturbance.   Respiratory: Positive for chest tightness. Negative for wheezing.    Cardiovascular: Positive for chest pain and palpitations.   Gastrointestinal: Positive for constipation and diarrhea (sometimes w/ diarrhe and sometimes constipation.). Negative for abdominal pain, blood in stool and vomiting.   Endocrine: Negative for polydipsia and polyuria.   Genitourinary: Negative for difficulty urinating, hematuria and urgency.   Musculoskeletal: Negative for arthralgias, joint swelling and neck pain.   Neurological: Negative for weakness and headaches.   Psychiatric/Behavioral: Negative for confusion and dysphoric mood.       Health Maintainence:   reviewed    PHYSICAL EXAM     /64 (BP Location: Left arm, Patient Position: Sitting, BP Method: Large (Manual))   Pulse 79   Temp 98.6 °F (37 °C)   Ht  "5' 10" (1.778 m)   Wt 103 kg (227 lb 1.2 oz)   SpO2 98%   BMI 32.58 kg/m²     GEN - A+OX4, NAD   HEENT - PERRL, EOMI, OP clear. MMM. L TM ceruminosis.   Neck - No thyromegaly or cervical LAD. No thyroid masses felt.  CV - RRR, no m/r   Chest - CTAB, no wheezing or rhonchi  Abd - S/NT/ND/+BS.   Ext - 2+BDP and radial pulses. No LE edema.   Neuro - PERRL, EOMI, no nystagmus, eyebrow raise, facial sensation, hearing, m of mastication, smile, palatal raise, shoulder shrug, tongue protrusion symmetric and intact. 5/5 BUE and BLE strength. Sensation to light touch intact throughout. 2+ DTRs. Normal gait.   MSK - No spinal tenderness to palpation. Normal gait.   Skin - No rash.    LABS     Previous labs reviewed.    ASSESSMENT/PLAN     Royal Yomi VAIL is a 19 y.o. male with   was seen today for annual exam.    Diagnoses and all orders for this visit:    Annual physical exam  -     CBC auto differential; Future; Expected date: 03/11/2019    Elevated ALT measurement  -     Hepatic function panel; Future; Expected date: 03/11/2019  -     CBC auto differential; Future; Expected date: 03/11/2019    HARITHA (generalized anxiety disorder) - cont current meds. F/u w/ psychiatrist, which pt has upcoming appt. Discussed possibly trying ativan 2 pills prior to performances to see if it'll help w/ panic attacks.      Ceruminosis, left - s/p ear cleaning.     Reports feels like he has heavy breathing. No cough/congestion/fever. O2 98%. Able to walk w/o issues and currently in school in [facility].     RTC in 12 months, sooner if needed and depending on labs.    Maite Franks MD  [facility] [address]  1:41 PM  "

## 2019-03-12 ENCOUNTER — TELEPHONE (OUTPATIENT)
Dept: INTERNAL MEDICINE | Facility: CLINIC | Age: 19
End: 2019-03-12

## 2019-03-12 VITALS
SYSTOLIC BLOOD PRESSURE: 114 MMHG | BODY MASS INDEX: 32.51 KG/M2 | HEIGHT: 70 IN | TEMPERATURE: 99 F | DIASTOLIC BLOOD PRESSURE: 64 MMHG | HEART RATE: 79 BPM | OXYGEN SATURATION: 98 % | WEIGHT: 227.06 LBS

## 2019-03-12 DIAGNOSIS — R74.01 ELEVATED ALT MEASUREMENT: Primary | ICD-10-CM

## 2019-03-12 NOTE — TELEPHONE ENCOUNTER
ALT still elevated. Please schedule US of liver prior to Friday (pt leaving for [facility] on Fri)

## 2019-03-12 NOTE — TELEPHONE ENCOUNTER
----- Message from Jessica Buitrago sent at 3/12/2019  4:51 PM CDT -----  Contact: Self [phone]  Patient is returning a phone call.  Who left a message for the patient: Misti  Does patient know what this is regarding:    Comments:

## 2019-03-13 ENCOUNTER — TELEPHONE (OUTPATIENT)
Dept: INTERNAL MEDICINE | Facility: CLINIC | Age: 19
End: 2019-03-13

## 2019-03-13 ENCOUNTER — HOSPITAL ENCOUNTER (OUTPATIENT)
Dept: RADIOLOGY | Facility: HOSPITAL | Age: 19
Discharge: HOME | End: 2019-03-13
Attending: INTERNAL MEDICINE
Payer: COMMERCIAL

## 2019-03-13 ENCOUNTER — CLINICAL SUPPORT (OUTPATIENT)
Dept: AUDIOLOGY | Facility: CLINIC | Age: 19
End: 2019-03-13
Payer: COMMERCIAL

## 2019-03-13 DIAGNOSIS — R74.01 ELEVATED ALT MEASUREMENT: ICD-10-CM

## 2019-03-13 DIAGNOSIS — R16.0 HEPATOMEGALY: ICD-10-CM

## 2019-03-13 PROCEDURE — 76700 US EXAM ABDOM COMPLETE: CPT | Mod: TC

## 2019-03-13 PROCEDURE — 76700 US ABDOMEN COMPLETE: ICD-10-PCS | Mod: 26,,, | Performed by: RADIOLOGY

## 2019-03-13 PROCEDURE — 76700 US EXAM ABDOM COMPLETE: CPT | Mod: 26,,, | Performed by: RADIOLOGY

## 2019-03-13 NOTE — PROGRESS NOTES
Patient here today for Audiogram prior to his appointment with Dr. Carbajal tomorrow.  Otoscopy revealed the right ear is occluded with cerumen.  Audiogram cannot be completed today.  Patient will keep his appointment with Dr. Carbajal tomorrow and was scheduled for an Audiogram tomorrow.

## 2019-03-14 ENCOUNTER — OFFICE VISIT (OUTPATIENT)
Dept: OTOLARYNGOLOGY | Facility: CLINIC | Age: 19
End: 2019-03-14
Payer: COMMERCIAL

## 2019-03-14 ENCOUNTER — CLINICAL SUPPORT (OUTPATIENT)
Dept: AUDIOLOGY | Facility: CLINIC | Age: 19
End: 2019-03-14
Payer: COMMERCIAL

## 2019-03-14 VITALS
DIASTOLIC BLOOD PRESSURE: 78 MMHG | DIASTOLIC BLOOD PRESSURE: 78 MMHG | HEIGHT: 71 IN | SYSTOLIC BLOOD PRESSURE: 132 MMHG | BODY MASS INDEX: 31.91 KG/M2 | BODY MASS INDEX: 31.91 KG/M2 | HEIGHT: 71 IN | WEIGHT: 227.94 LBS | WEIGHT: 227.94 LBS | SYSTOLIC BLOOD PRESSURE: 132 MMHG

## 2019-03-14 DIAGNOSIS — H93.13 TINNITUS AURIUM, BILATERAL: ICD-10-CM

## 2019-03-14 DIAGNOSIS — H61.21 IMPACTED CERUMEN OF RIGHT EAR: Primary | ICD-10-CM

## 2019-03-14 DIAGNOSIS — H93.293 ABNORMAL AUDITORY PERCEPTION, BILATERAL: Primary | ICD-10-CM

## 2019-03-14 PROCEDURE — 99203 OFFICE O/P NEW LOW 30 MIN: CPT | Mod: 25,S$GLB,, | Performed by: OTOLARYNGOLOGY

## 2019-03-14 PROCEDURE — 92557 PR COMPREHENSIVE HEARING TEST: ICD-10-PCS | Mod: S$GLB,,, | Performed by: AUDIOLOGIST

## 2019-03-14 PROCEDURE — 92550 PR TYMPANOMETRY AND REFLEX THRESHOLD MEASUREMENTS: ICD-10-PCS | Mod: S$GLB,,, | Performed by: AUDIOLOGIST

## 2019-03-14 PROCEDURE — 92550 TYMPANOMETRY & REFLEX THRESH: CPT | Mod: S$GLB,,, | Performed by: AUDIOLOGIST

## 2019-03-14 PROCEDURE — 3008F PR BODY MASS INDEX (BMI) DOCUMENTED: ICD-10-PCS | Mod: CPTII,S$GLB,, | Performed by: OTOLARYNGOLOGY

## 2019-03-14 PROCEDURE — 92557 COMPREHENSIVE HEARING TEST: CPT | Mod: S$GLB,,, | Performed by: AUDIOLOGIST

## 2019-03-14 PROCEDURE — 99203 PR OFFICE/OUTPT VISIT, NEW, LEVL III, 30-44 MIN: ICD-10-PCS | Mod: 25,S$GLB,, | Performed by: OTOLARYNGOLOGY

## 2019-03-14 PROCEDURE — 69210 REMOVE IMPACTED EAR WAX UNI: CPT | Mod: S$GLB,,, | Performed by: OTOLARYNGOLOGY

## 2019-03-14 PROCEDURE — 69210 EAR CERUMEN REMOVAL: ICD-10-PCS | Mod: S$GLB,,, | Performed by: OTOLARYNGOLOGY

## 2019-03-14 PROCEDURE — 3008F BODY MASS INDEX DOCD: CPT | Mod: CPTII,S$GLB,, | Performed by: OTOLARYNGOLOGY

## 2019-03-14 NOTE — LETTER
March 14, 2019      Maite Franks MD  [address]  [facility] [address]           [facility]  [address] [facility] [address]  [place] [address]  Phone: [phone]          Patient: Royal Yomi VAIL   MR Number: [id number]   YOB: 2000   Date of Visit: 3/14/2019       Dear Dr. Maite Franks:    Thank you for referring Royal Celaya to me for evaluation. Attached you will find relevant portions of my assessment and plan of care.    If you have questions, please do not hesitate to call me. I look forward to following Royal Celaya along with you.    Sincerely,    Skyler Carbajal MD    Enclosure  CC:  No Recipients    If you would like to receive this communication electronically, please contact [email or web address] or [phone] to request more information on [employer] Link access.    For providers and/or their staff who would like to refer a patient to [facility], please contact us through our one-stop-shop provider referral line, [facility], at [phone].    If you feel you have received this communication in error or would no longer like to receive these types of communications, please e-mail [email or web address]

## 2019-03-14 NOTE — TELEPHONE ENCOUNTER
Spoke with pt, notified of results and hepatology appt. Pt says she will schedule when he knows next break. Advised to avoid alcohol.

## 2019-03-14 NOTE — PROCEDURES
Ear Cerumen Removal  Date/Time: 3/14/2019 2:25 PM  Performed by: Skyler Carbajal MD  Authorized by: Skyler Carbajal MD     Consent Done?:  Yes (Verbal)    Local anesthetic:  None  Location details:  Right ear  Procedure type: curette    Cerumen  Removal Results:  Cerumen completely removed  Patient tolerance:  Patient tolerated the procedure well with no immediate complications     Binocular microscopy used to examine ear canal and tympanic membrane.  There was excess cerumen on the left side and an impaction of cerumen on the right.  This was removed using a curette and other microinstrumentation. After removal TM and EAC were normal.

## 2019-03-14 NOTE — PROGRESS NOTES
Click the link below to view the audiogram in full:  Document on 3/14/2019  1:55 PM by CANDIE LangeD: Audiogram    Findings:     Pt seen today follow an ear cleaning with Dr. Carbajal.  He reported that he has some pressure in both ear that has stopped following the ear cleanings.  Testing revealed normal hearing bilaterally.

## 2019-03-14 NOTE — PROGRESS NOTES
Subjective:       Patient ID: Royal Yomi VAIL is a 19 y.o. male.    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  Onset was sudden.  Denies recent loud noise exposure.  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  Tinnitus is bilateral and is improving.  High pitched ringing.  Denies otalgia, hearing loss, otorrhea, history of ear infections.  Had ears partially cleaned by urgent care yesterday.    Review of Systems   Constitutional: Negative for chills, fever and unexpected weight change.   HENT: Negative for sore throat and trouble swallowing.    Eyes: Negative for pain and visual disturbance.   Respiratory: Negative for apnea and shortness of breath.    Cardiovascular: Negative for chest pain and palpitations.   Gastrointestinal: Negative for abdominal pain and nausea.   Endocrine: Negative for cold intolerance and heat intolerance.   Musculoskeletal: Negative for joint swelling and neck stiffness.   Skin: Negative for color change and rash.   Neurological: Negative for facial asymmetry and headaches.   Hematological: Negative for adenopathy. Does not bruise/bleed easily.   Psychiatric/Behavioral: Negative for agitation. The patient is not nervous/anxious.        Objective:      Physical Exam   Constitutional: He is oriented to person, place, and time. He appears well-developed and well-nourished. No distress.   HENT:   Head: Normocephalic and atraumatic.   Right Ear: Tympanic membrane, external ear and ear canal normal. Decreased hearing (cerumen impaction see note) is noted.   Left Ear: Tympanic membrane, external ear and ear canal normal.   Nose: Nose normal.   Mouth/Throat: Uvula is midline, oropharynx is clear and moist and mucous membranes are normal.   Rao: Midline  Rinne: AC > BC @ 512Hz   Eyes: Conjunctivae and EOM are normal. Pupils are equal, round, and reactive to light.   Neck: Normal range  of motion. No tracheal deviation present. No thyromegaly present.   Cardiovascular: Normal rate and regular rhythm.   Pulmonary/Chest: Effort normal. No respiratory distress.   Musculoskeletal: Normal range of motion.   Lymphadenopathy:        Head (right side): No submental, no submandibular and no tonsillar adenopathy present.        Head (left side): No submental, no submandibular and no tonsillar adenopathy present.     He has no cervical adenopathy.   Neurological: He is alert and oriented to person, place, and time.   Psychiatric: He has a normal mood and affect. His behavior is normal.   Nursing note and vitals reviewed.      Data:  Document on 3/14/2019  1:55 PM by KUMAR Lange.D: Audiogram  Audiogram tracings independently reviewed and discussed with patient shows normal thresholds, WRS, tympanograms, and reflexes AU      Assessment:       1. Impacted cerumen of right ear    2. Tinnitus aurium, bilateral        Plan:     Discussed with patient multiple tinnitus management strategies including the use of maskers, instruments, background sound enrichment and other means. All questions answered and appropriate references given.

## 2019-03-14 NOTE — TELEPHONE ENCOUNTER
Please call and notify pt:    Abdominal ultrasound shows enlarged liver although no lesions or fatty liver. I do recommend follow up with hepatology for further evaluation but can wait till his next school break. In the meantime, try to avoid alcohol.

## 2019-03-15 ENCOUNTER — DOCUMENTATION ONLY (OUTPATIENT)
Dept: TRANSPLANT | Facility: CLINIC | Age: 19
End: 2019-03-15

## 2019-03-15 NOTE — LETTER
March 15, 2019    Leanna Celaya  [address] [facility] [address]      Dear Leanna Celaya:    Your doctor has referred you to the [facility]. We are sending this letter to remind you to make an appointment with us to complete the referral process.     Please call us at [phone] to schedule an appointment. We look forward to seeing you soon.     If you received a call and have been scheduled, please disregard this letter.       Sincerely,        [facility]   [facility] [facility] [place] [address] [phone]

## 2019-03-15 NOTE — NURSING
Pt records reviewed.   Pt will be referred to Hepatology.    Initial referral received  from the workque.   Referring Provider/diagnosis  AMELIA GR Provider:   Diagnosis: Hepatomegaly  Elevated ALT measurement         Referral letter sent to patient.

## 2019-05-16 ENCOUNTER — LAB VISIT (OUTPATIENT)
Dept: LAB | Facility: HOSPITAL | Age: 19
End: 2019-05-16
Payer: COMMERCIAL

## 2019-05-16 ENCOUNTER — OFFICE VISIT (OUTPATIENT)
Dept: HEPATOLOGY | Facility: CLINIC | Age: 19
End: 2019-05-16
Payer: COMMERCIAL

## 2019-05-16 ENCOUNTER — PROCEDURE VISIT (OUTPATIENT)
Dept: HEPATOLOGY | Facility: CLINIC | Age: 19
End: 2019-05-16
Attending: NURSE PRACTITIONER
Payer: COMMERCIAL

## 2019-05-16 VITALS
OXYGEN SATURATION: 98 % | SYSTOLIC BLOOD PRESSURE: 135 MMHG | HEIGHT: 70 IN | RESPIRATION RATE: 18 BRPM | HEART RATE: 104 BPM | WEIGHT: 237.19 LBS | TEMPERATURE: 98 F | DIASTOLIC BLOOD PRESSURE: 72 MMHG | BODY MASS INDEX: 33.96 KG/M2

## 2019-05-16 DIAGNOSIS — R74.01 ELEVATED ALT MEASUREMENT: Primary | ICD-10-CM

## 2019-05-16 DIAGNOSIS — R16.0 HEPATOMEGALY: ICD-10-CM

## 2019-05-16 DIAGNOSIS — R74.8 ELEVATED LIVER ENZYMES: ICD-10-CM

## 2019-05-16 LAB
ALBUMIN SERPL BCP-MCNC: 4.3 G/DL (ref 3.5–5.2)
ALP SERPL-CCNC: 88 U/L (ref 55–135)
ALT SERPL W/O P-5'-P-CCNC: 32 U/L (ref 10–44)
AST SERPL-CCNC: 27 U/L (ref 10–40)
BILIRUB DIRECT SERPL-MCNC: 0.1 MG/DL (ref 0.1–0.3)
BILIRUB SERPL-MCNC: 0.3 MG/DL (ref 0.1–1)
CERULOPLASMIN SERPL-MCNC: 30 MG/DL (ref 15–45)
FERRITIN SERPL-MCNC: 123 NG/ML (ref 20–300)
HBV SURFACE AG SERPL QL IA: NEGATIVE
HCV AB SERPL QL IA: NEGATIVE
IGG SERPL-MCNC: 938 MG/DL (ref 650–1600)
IRON SERPL-MCNC: 72 UG/DL (ref 45–160)
PROT SERPL-MCNC: 7.4 G/DL (ref 6–8.4)
SATURATED IRON: 19 % (ref 20–50)
TOTAL IRON BINDING CAPACITY: 388 UG/DL (ref 250–450)
TRANSFERRIN SERPL-MCNC: 262 MG/DL (ref 200–375)

## 2019-05-16 PROCEDURE — 99204 PR OFFICE/OUTPT VISIT, NEW, LEVL IV, 45-59 MIN: ICD-10-PCS | Mod: S$GLB,,, | Performed by: NURSE PRACTITIONER

## 2019-05-16 PROCEDURE — 86038 ANTINUCLEAR ANTIBODIES: CPT

## 2019-05-16 PROCEDURE — 86256 FLUORESCENT ANTIBODY TITER: CPT | Mod: 91

## 2019-05-16 PROCEDURE — 82728 ASSAY OF FERRITIN: CPT

## 2019-05-16 PROCEDURE — 82657 ENZYME CELL ACTIVITY: CPT

## 2019-05-16 PROCEDURE — 83540 ASSAY OF IRON: CPT

## 2019-05-16 PROCEDURE — 80076 HEPATIC FUNCTION PANEL: CPT

## 2019-05-16 PROCEDURE — 99999 PR PBB SHADOW E&M-EST. PATIENT-LVL IV: ICD-10-PCS | Mod: PBBFAC,,, | Performed by: NURSE PRACTITIONER

## 2019-05-16 PROCEDURE — 3008F BODY MASS INDEX DOCD: CPT | Mod: CPTII,S$GLB,, | Performed by: NURSE PRACTITIONER

## 2019-05-16 PROCEDURE — 86235 NUCLEAR ANTIGEN ANTIBODY: CPT

## 2019-05-16 PROCEDURE — 82784 ASSAY IGA/IGD/IGG/IGM EACH: CPT

## 2019-05-16 PROCEDURE — 91200 PR LIVER ELASTOGRAPHY W/OUT IMAG W/INTERP & REPORT: ICD-10-PCS | Mod: S$GLB,,, | Performed by: NURSE PRACTITIONER

## 2019-05-16 PROCEDURE — 3008F PR BODY MASS INDEX (BMI) DOCUMENTED: ICD-10-PCS | Mod: CPTII,S$GLB,, | Performed by: NURSE PRACTITIONER

## 2019-05-16 PROCEDURE — 91200 LIVER ELASTOGRAPHY: CPT | Mod: S$GLB,,, | Performed by: NURSE PRACTITIONER

## 2019-05-16 PROCEDURE — 99204 OFFICE O/P NEW MOD 45 MIN: CPT | Mod: S$GLB,,, | Performed by: NURSE PRACTITIONER

## 2019-05-16 PROCEDURE — 86803 HEPATITIS C AB TEST: CPT

## 2019-05-16 PROCEDURE — 82103 ALPHA-1-ANTITRYPSIN TOTAL: CPT

## 2019-05-16 PROCEDURE — 87340 HEPATITIS B SURFACE AG IA: CPT

## 2019-05-16 PROCEDURE — 99999 PR PBB SHADOW E&M-EST. PATIENT-LVL IV: CPT | Mod: PBBFAC,,, | Performed by: NURSE PRACTITIONER

## 2019-05-16 PROCEDURE — 36415 COLL VENOUS BLD VENIPUNCTURE: CPT

## 2019-05-16 PROCEDURE — 82390 ASSAY OF CERULOPLASMIN: CPT

## 2019-05-16 NOTE — PROGRESS NOTES
I have personally performed a face to face diagnostic evaluation on this patient. I have reviewed and agree with today's findings and the care plan outlined by Justa Garner NP      My findings are as follows:  Patient presents with elevated LFTs  Overweight  Body mass index is 34.04 kg/m².    Likely NAFLD but will rule out other causes of liver disease with labs  Stage disease with fibroscan       he will return to Justa Garner NP  for follow-up.

## 2019-05-16 NOTE — LETTER
May 16, 2019      Maite Franks MD  [address]  [facility] [address]           [facility]  [address]  [facility] [address]  Phone: [phone]  Fax: [phone]          Patient: Royal HALLEY Celaya III   MR Number: [id number]   YOB: 2000   Date of Visit: 5/16/2019       Dear Dr. Maite Franks:    Thank you for referring Royal Celaya to me for evaluation. Attached you will find relevant portions of my assessment and plan of care.    If you have questions, please do not hesitate to call me. I look forward to following Royal Celaya along with you.    Sincerely,    Justa Garner, NP    Enclosure  CC:  No Recipients    If you would like to receive this communication electronically, please contact [email or web address] or [phone] to request more information on [email or web address] Link access.    For providers and/or their staff who would like to refer a patient to [facility], please contact us through our one-stop-shop provider referral line, [facility], at [phone].    If you feel you have received this communication in error or would no longer like to receive these types of communications, please e-mail [email or web address]

## 2019-05-16 NOTE — PROCEDURES
Fibroscan Procedure     Name: Royal HALLEY Celaya III  Date of Procedure : 2019   :: Justa Garner NP  Diagnosis: elevated liver enzymes    Probe: XL    Findings  Median liver stiffness score: 5.4 KPa  CAP readin dB/m    IQR/med: 19 %    Interpretation  Fibrosis interpretation is based on medial liver stiffness - Kilopascal (kPa).     Fibrosis stage: F 0-1     Steatosis interpretation is based on controlled attenuation parameter - (dB/m).    Steatosis grade: S3 , >67% steatosis

## 2019-05-16 NOTE — PROGRESS NOTES
[facility] - New Patient    Referring Provider: Dr. Maite Franks    CHIEF COMPLAINT: Elevated liver enzymes    HPI: This is a 19 y.o. White male referred for evaluation of elevated liver enzymes.  He is here today with mother.    Review of labs:   - ALT elevated since 3/2019 (50-60)  - ALP normal  - Synthetic liver function normal  - Platelets normal     Workup thus far:  Abd US (3/13/19): hepatomegaly (17.5 cm), homogenous hepatic echotexture, no focal hepatic lesions, no biliary dilation, spleen upper limits of normal (11.9 cm)    He does have risk factors for fatty liver including:   · Obesity/overweight -- Body mass index is 34.04 kg/m².                        · Dyslipidemia -- no                               · Insulin resistance / diabetes -- no         · Hypertension -- no  · Alcohol use -- rare    Reports poor diet (a lot of junk food) and very little exercise. Weight is up ~10 lb over the last 2 months. Currently in college at the [employer].      -No IV or intranasal drug use  -No high-risk medications  -Med changes: propranolol and buspar added for panic attacks in March   -No family history of liver disease     He feels well overall, no complaints. Denies symptoms of hepatic decompensation including jaundice, dark urine, hematemesis, melena, slowed mentation, abdominal distention, or lower extremity edema.         Review of patient's allergies indicates:   Allergen Reactions    Pcn [penicillins] Hives        Current Outpatient Medications on File Prior to Visit   Medication Sig Dispense Refill    busPIRone (BUSPAR) 5 MG Tab Take 5 mg by mouth 2 (two) times daily.      propranolol (INDERAL) 40 MG tablet Take 40 mg by mouth once daily.       FLUoxetine (PROZAC) 40 MG capsule Take 1 capsule (40 mg total) by mouth once daily. 90 capsule 1    LORazepam (ATIVAN) 0.5 MG tablet Take 1 tablet (0.5 mg total) by mouth every 4 (four) hours as needed for Anxiety. 60 tablet 1     [DISCONTINUED] lisdexamfetamine (VYVANSE) 20 MG capsule Take 1 capsule (20 mg total) by mouth every morning. 30 capsule 0     No current facility-administered medications on file prior to visit.        PMHX:  has a past medical history of ADHD, Autism, Constipation - functional, Multiple nevi, and Social anxiety disorder.    PSHX:  has a past surgical history that includes Testicle surgery (2001).    FAMILY HISTORY:   Family History   Problem Relation Age of Onset    Nevi Father     Hyperlipidemia Father     Hypertension Father     Mental illness Father     Asthma Sister     Blindness Maternal Grandmother     Glaucoma Maternal Grandmother     Alzheimer's disease Maternal Grandmother     Cataracts Maternal Grandfather     Cancer Maternal Grandfather         SKIN CA    Heart disease Paternal Grandmother     Anxiety disorder Paternal Grandfather     Cancer Paternal Grandfather         prostate CA    Amblyopia Neg Hx     Diabetes Neg Hx     Retinal detachment Neg Hx     Strabismus Neg Hx     Cirrhosis Neg Hx        SOCIAL HISTORY:   Social History     Tobacco Use   Smoking Status Passive Smoke Exposure - Never Smoker   Smokeless Tobacco Never Used   Tobacco Comment    Dad smokes a pipe       Social History     Substance and Sexual Activity   Alcohol Use Yes    Comment: rare       Social History     Substance and Sexual Activity   Drug Use No         Review of Systems   Constitutional: Negative for appetite change, chills, fatigue, fever. (+) weight gain   Eyes: Negative for visual disturbance.   Respiratory: Negative for cough and shortness of breath.    Cardiovascular: Negative for chest pain, palpitations and leg swelling.   Gastrointestinal: Negative for abdominal distention, abdominal pain, blood in stool, constipation, diarrhea, nausea and vomiting. No change in stool color.  Genitourinary: Negative for dysuria and hematuria. Denies dark urine.   Musculoskeletal: Negative for arthralgias, gait  "problem, joint swelling and myalgias.   Skin: Negative for color change, rash and wound. Denies itching.   Neurological: Negative for dizziness, tremors, speech difficulty, and headaches.   Hematological: Does not bruise/bleed easily.   Psychiatric/Behavioral: Negative for confusion, decreased concentration and sleep disturbance. Denies memory loss. Denies depression. The patient is not nervous/anxious.        Physical Exam   Constitutional: Well-nourished. No distress. Alert and oriented to person, place, and time.  Eyes: No scleral icterus.   Cardiovascular: Normal rate, regular rhythm and normal heart sounds. No murmur heard.  Pulmonary/Chest: Respiratory effort and breath sounds normal. No respiratory distress.   Abdominal: Soft, non-tender. No distension; no ascites appreciated. There is no palpable hepatosplenomegaly. No hernia or mass.   Musculoskeletal: No edema.   Neurological: No tremor. Coordination and gait normal. No asterixis.    Skin: Skin is warm and dry. No rash or erythema. No jaundice. No telangiectasias or palmar erythema noted.  Psychiatric: Normal mood and affect. Speech, behavior, and thought content normal. No depression or anxiety noted.       /72 (BP Location: Right arm, Patient Position: Sitting, BP Method: Medium (Automatic))   Pulse 104   Temp 98.1 °F (36.7 °C) (Oral)   Resp 18   Ht 5' 10" (1.778 m)   Wt 107.6 kg (237 lb 3.4 oz)   SpO2 98%   BMI 34.04 kg/m²       LABS:  Lab Results   Component Value Date    WBC 6.01 03/11/2019    HGB 13.5 (L) 03/11/2019    HCT 41.7 03/11/2019     03/11/2019     03/04/2019    K 4.2 03/04/2019    CREATININE 0.8 03/04/2019    ALT 32 05/16/2019    AST 27 05/16/2019    ALKPHOS 88 05/16/2019    BILITOT 0.3 05/16/2019    BILIDIR 0.1 05/16/2019    ALBUMIN 4.3 05/16/2019    IGGSERUM 938 05/16/2019    FERRITIN 123 05/16/2019    FESATURATED 19 (L) 05/16/2019    CHOL 152 03/04/2019    TRIG 83 03/04/2019    LDLCALC 87.4 03/04/2019    HGBA1C " 5.1 03/04/2019       No results found for: HEPAIGG    Hepatitis B Surface Ag   Date Value Ref Range Status   05/16/2019 Negative  Final     No results found for: HEPBCAB  No results found for: HEPBSAB  Hepatitis C Ab   Date Value Ref Range Status   05/16/2019 Negative  Final     Immunization History   Administered Date(s) Administered    DTaP 1998, 1998, 1998, 05/10/1999, 10/08/2002    HIB 1998, 1998, 1998, 05/10/1999    HPV Quadrivalent 12/20/2012, 02/27/2013, 06/20/2013    Hepatitis A, Pediatric/Adolescent, 2 Dose 02/19/2015    Hepatitis B, Pediatric/Adolescent 1998, 1998, 1998    IPV 1998, 1998, 02/01/1999, 02/19/2002    Influenza 12/15/2008, 10/15/2010, 11/07/2011, 12/20/2012, 03/06/2014    Influenza - Intranasal - Quadrivalent 02/19/2015, 02/22/2016    Influenza - Trivalent - PF (ADULT) 03/06/2014    Influenza Split 12/20/2012    MMR 02/01/1999, 02/19/2002    Meningococcal Conjugate 03/06/2014    Meningococcal Conjugate (MCV4P) 08/27/2009, 03/06/2014    Tdap 08/27/2009    Varicella 02/01/1999, 08/27/2009          DIAGNOSTIC STUDIES:  ABD. U/S   Results for orders placed during the hospital encounter of 03/13/19   US Abdomen Complete    Narrative EXAMINATION:  US ABDOMEN COMPLETE    CLINICAL HISTORY:  Nonspecific elevation of levels of transaminase and lactic acid dehydrogenase (ldh)    TECHNIQUE:  Complete abdominal ultrasound (including pancreas, aorta, liver, gallbladder, common bile duct, IVC, kidneys, and spleen) was performed.    COMPARISON:  None    FINDINGS:  Pancreas: The visualized portions of pancreas appear normal.    Aorta: No aneurysm.    Liver: 17.5 cm, enlarged in size. Homogeneous parenchymal echotexture. No focal lesions.    Gallbladder: No calculi, wall thickening, or pericholecystic fluid.  Negative sonographic Alex's sign.    Biliary system: 3 mm common bile duct.  No intrahepatic ductal  dilatation.    Inferior vena cava: Normal in appearance.    Right kidney: 10.4 cm. No hydronephrosis.    Left kidney: 11.5 cm. No hydronephrosis.    Spleen: 11.9 x 4.7 cm.  Normal in size with homogeneous echotexture.    Miscellaneous: No ascites.      Impression Hepatomegaly.    Electronically signed by resident: Lacy Linton  Date:    03/13/2019  Time:    16:07    Electronically signed by: Gage Salgado MD  Date:    03/13/2019  Time:    16:40         ASSESSMENT / PLAN:  19 y.o. White male with:      1. Elevated liver enzyme (ALT) - May be due to NAFLD  -- Serologic workup to r/o other disorders that could be causing elevated liver enzymes  -- Fibroscan today for fibrosis and steatosis staging  -- Discussed that if determined to be due to fatty liver, management includes weight loss with diet/exercise.     2. Hepatomegaly  -- Suspect due to fatty liver      Orders Placed This Encounter   Procedures    US Elastography Liver    Hepatic function panel    TRAM Screen w/Reflex    Antimitochondrial antibody    Anti-smooth muscle antibody    IgG    Hepatitis C antibody    Hepatitis B surface antigen    Ferritin    Iron and TIBC    Alpha 1 Antitrypsin Phenotype    Ceruloplasmin    Lysosomal Acid Lipase Deficiency         Return to clinic pending above results.         Thank you for allowing me to participate in the care of Pomona ANNA Cross III  Hepatology and Liver Transplant     Patient was seen in clinic with Dr. Armas; Case discussed, plan reviewed.       Addendum: Fibroscan kPa = 5.4, F0-F1, no-mild fibrosis. CAP = 319, S3 or >67% steatosis. Results reviewed with patient in clinic.

## 2019-05-17 LAB
ANA SER QL IF: NORMAL
MITOCHONDRIA AB TITR SER IF: NORMAL {TITER}
SMOOTH MUSCLE AB TITR SER IF: NORMAL {TITER}

## 2019-05-20 DIAGNOSIS — R52 PAIN: Primary | ICD-10-CM

## 2019-05-20 LAB
LALB - REVIEWED BY:: NORMAL
LALB INTERPRETATION: NORMAL
LYSOSOMAL ACID LIPASE: 130.9 NMOL/H/ML

## 2019-05-21 LAB
A1AT PHENOTYP SERPL-IMP: ABNORMAL BANDS
A1AT SERPL NEPH-MCNC: 72 MG/DL (ref 100–190)

## 2019-05-22 ENCOUNTER — TELEPHONE (OUTPATIENT)
Dept: ORTHOPEDICS | Facility: CLINIC | Age: 19
End: 2019-05-22

## 2019-05-22 ENCOUNTER — PATIENT MESSAGE (OUTPATIENT)
Dept: ORTHOPEDICS | Facility: CLINIC | Age: 19
End: 2019-05-22

## 2019-05-22 NOTE — TELEPHONE ENCOUNTER
Called to inform the patient that he needs an xray before his appointment.     Unable to make contact;left a voicemail. Will send patient portal message.

## 2019-05-23 ENCOUNTER — OFFICE VISIT (OUTPATIENT)
Dept: ORTHOPEDICS | Facility: CLINIC | Age: 19
End: 2019-05-23
Payer: COMMERCIAL

## 2019-05-23 ENCOUNTER — HOSPITAL ENCOUNTER (OUTPATIENT)
Dept: RADIOLOGY | Facility: OTHER | Age: 19
Discharge: HOME | End: 2019-05-23
Attending: PHYSICIAN ASSISTANT
Payer: COMMERCIAL

## 2019-05-23 VITALS
DIASTOLIC BLOOD PRESSURE: 65 MMHG | SYSTOLIC BLOOD PRESSURE: 112 MMHG | BODY MASS INDEX: 34.04 KG/M2 | WEIGHT: 237.19 LBS | HEART RATE: 99 BPM

## 2019-05-23 DIAGNOSIS — R52 PAIN: ICD-10-CM

## 2019-05-23 DIAGNOSIS — M79.645 FINGER PAIN, LEFT: Primary | ICD-10-CM

## 2019-05-23 DIAGNOSIS — M79.89 SWELLING OF LEFT INDEX FINGER: ICD-10-CM

## 2019-05-23 PROCEDURE — 99204 PR OFFICE/OUTPT VISIT, NEW, LEVL IV, 45-59 MIN: ICD-10-PCS | Mod: S$GLB,,, | Performed by: PHYSICIAN ASSISTANT

## 2019-05-23 PROCEDURE — 73130 X-RAY EXAM OF HAND: CPT | Mod: 26,LT,, | Performed by: RADIOLOGY

## 2019-05-23 PROCEDURE — 99204 OFFICE O/P NEW MOD 45 MIN: CPT | Mod: S$GLB,,, | Performed by: PHYSICIAN ASSISTANT

## 2019-05-23 PROCEDURE — 99999 PR PBB SHADOW E&M-EST. PATIENT-LVL III: CPT | Mod: PBBFAC,,, | Performed by: PHYSICIAN ASSISTANT

## 2019-05-23 PROCEDURE — 3008F PR BODY MASS INDEX (BMI) DOCUMENTED: ICD-10-PCS | Mod: CPTII,S$GLB,, | Performed by: PHYSICIAN ASSISTANT

## 2019-05-23 PROCEDURE — 3008F BODY MASS INDEX DOCD: CPT | Mod: CPTII,S$GLB,, | Performed by: PHYSICIAN ASSISTANT

## 2019-05-23 PROCEDURE — 99999 PR PBB SHADOW E&M-EST. PATIENT-LVL III: ICD-10-PCS | Mod: PBBFAC,,, | Performed by: PHYSICIAN ASSISTANT

## 2019-05-23 PROCEDURE — 73130 X-RAY EXAM OF HAND: CPT | Mod: TC,FY,LT

## 2019-05-23 PROCEDURE — 73130 XR HAND COMPLETE 3 VIEW LEFT: ICD-10-PCS | Mod: 26,LT,, | Performed by: RADIOLOGY

## 2019-05-23 NOTE — PROGRESS NOTES
Subjective:      Patient ID: Royal HALLEY Celaya III is a 19 y.o. male.    Chief Complaint: Pain of the Left Hand      HPI  Royal HALLEY Celaya III is a right hand dominant 19 y.o. male presenting today for left index finger swelling. Onset about 2 weeks ago. He denies injury. He does not recall anything puncturing the finger. He has swelling and pain of the finger. Pain is located over the volar aspect of the proximal phalanx and MCP joint. Denies triggering. Denies numbness/tingling. He is in school for music.     Review of patient's allergies indicates:   Allergen Reactions    Pcn [penicillins] Hives         Current Outpatient Medications   Medication Sig Dispense Refill    busPIRone (BUSPAR) 5 MG Tab Take 5 mg by mouth 2 (two) times daily.      FLUoxetine (PROZAC) 40 MG capsule Take 1 capsule (40 mg total) by mouth once daily. 90 capsule 1    LORazepam (ATIVAN) 0.5 MG tablet Take 1 tablet (0.5 mg total) by mouth every 4 (four) hours as needed for Anxiety. 60 tablet 1    propranolol (INDERAL) 40 MG tablet Take 40 mg by mouth once daily.        No current facility-administered medications for this visit.        Past Medical History:   Diagnosis Date    ADHD     Autism     Constipation - functional     Multiple nevi     Social anxiety disorder        Past Surgical History:   Procedure Laterality Date    TESTICLE SURGERY  2001    hydrocele       Review of Systems:  Constitutional: Negative for chills and fever.   Respiratory: Negative for cough and shortness of breath.    Gastrointestinal: Negative for nausea and vomiting.   Skin: Negative for rash.   Neurological: Negative for dizziness and headaches.   Psychiatric/Behavioral: Negative for depression.   MSK as in HPI       OBJECTIVE:     PHYSICAL EXAM:  /65   Pulse 99   Wt 107.6 kg (237 lb 3.4 oz)   BMI 34.04 kg/m²     GEN:  NAD, well-developed, well-groomed.  NEURO: Awake, alert, and oriented. Normal attention and concentration.    PSYCH: Normal mood  and affect. Behavior is normal.  HEENT: No cervical lymphadenopathy noted.  CARDIOVASCULAR: Radial pulses 2+ bilaterally. No LE edema noted.  PULMONARY: Breath sounds normal. No respiratory distress.  SKIN: Intact, no rashes.      MSK:   LUE:  Good active ROM of the wrist and fingers. There is decreased full flexion of the index finger. There is edema of the finger. He is ttp over the volar aspect of the proximal phalanx and MCP. There is no significant erythema present. No signs of infection. No triggering noted. AIN/PIN/Radial/Median/Ulnar Nerves assessed in isolation without deficit. Radial & Ulnar arteries palpated 2+. Capillary Refill <3s.      RADIOGRAPHS:  Xray left hand 5/23/19  FINDINGS:  There are no acute fractures or dislocations or osteoblastic or lytic lesions or significant erosive arthropathy.  Soft tissues are unremarkable.  Comments: I have personally reviewed the imaging and I agree with the above radiologist's report.    ASSESSMENT/PLAN:       ICD-10-CM ICD-9-CM   1. Finger pain, left M79.645 729.5   2. Swelling of left index finger M79.89 729.81       Orders Placed This Encounter    MRI Hand Fingers Without Contrast Left     Plan:   -Treatment options discussed including conservative treatment (OT, injection) vs obtaining further imaging. Pt wishes to proceed with MRI. Of note, he will be leaving town for 5 weeks in about one month.   -RTC for results.       The patient indicates understanding of these issues and agrees to the plan.    Norma Galdamez PA-C  Hand Clinic   [facility]  [place], [place]

## 2019-05-24 ENCOUNTER — PATIENT MESSAGE (OUTPATIENT)
Dept: ORTHOPEDICS | Facility: CLINIC | Age: 19
End: 2019-05-24

## 2019-05-28 ENCOUNTER — TELEPHONE (OUTPATIENT)
Dept: HEPATOLOGY | Facility: CLINIC | Age: 19
End: 2019-05-28

## 2019-05-28 DIAGNOSIS — R74.8 ELEVATED LIVER ENZYMES: Primary | ICD-10-CM

## 2019-05-28 DIAGNOSIS — R74.01 ELEVATED ALT MEASUREMENT: ICD-10-CM

## 2019-05-28 DIAGNOSIS — K76.0 NAFLD (NONALCOHOLIC FATTY LIVER DISEASE): ICD-10-CM

## 2019-05-28 NOTE — TELEPHONE ENCOUNTER
----- Message from Justa Garner NP sent at 5/28/2019 10:12 AM CDT -----  Patient notified of results and recommendations via [facility].   Please schedule follow-up visit in 6 months with hepatic panel.

## 2019-05-28 NOTE — TELEPHONE ENCOUNTER
Called spoke with patients mother, she scheduled labs and appointment for 6months because he (Royal) will be at school, he returns on 11/23/19

## 2019-05-30 ENCOUNTER — OFFICE VISIT (OUTPATIENT)
Dept: ORTHOPEDICS | Facility: CLINIC | Age: 19
End: 2019-05-30
Payer: COMMERCIAL

## 2019-05-30 VITALS
HEIGHT: 70 IN | BODY MASS INDEX: 33.96 KG/M2 | WEIGHT: 237.19 LBS | HEART RATE: 67 BPM | SYSTOLIC BLOOD PRESSURE: 101 MMHG | DIASTOLIC BLOOD PRESSURE: 67 MMHG

## 2019-05-30 DIAGNOSIS — M79.89 SWELLING OF LEFT INDEX FINGER: Primary | ICD-10-CM

## 2019-05-30 PROCEDURE — 3008F BODY MASS INDEX DOCD: CPT | Mod: CPTII,S$GLB,, | Performed by: PHYSICIAN ASSISTANT

## 2019-05-30 PROCEDURE — 3008F PR BODY MASS INDEX (BMI) DOCUMENTED: ICD-10-PCS | Mod: CPTII,S$GLB,, | Performed by: PHYSICIAN ASSISTANT

## 2019-05-30 PROCEDURE — 76942 ECHO GUIDE FOR BIOPSY: CPT | Mod: 26,S$GLB,, | Performed by: PHYSICIAN ASSISTANT

## 2019-05-30 PROCEDURE — 99999 PR PBB SHADOW E&M-EST. PATIENT-LVL III: CPT | Mod: PBBFAC,,, | Performed by: PHYSICIAN ASSISTANT

## 2019-05-30 PROCEDURE — 99213 PR OFFICE/OUTPT VISIT, EST, LEVL III, 20-29 MIN: ICD-10-PCS | Mod: 25,S$GLB,, | Performed by: PHYSICIAN ASSISTANT

## 2019-05-30 PROCEDURE — 20550 NJX 1 TENDON SHEATH/LIGAMENT: CPT | Mod: F1,S$GLB,, | Performed by: PHYSICIAN ASSISTANT

## 2019-05-30 PROCEDURE — 20550 PR INJECT TENDON SHEATH/LIGAMENT: ICD-10-PCS | Mod: F1,S$GLB,, | Performed by: PHYSICIAN ASSISTANT

## 2019-05-30 PROCEDURE — 99999 PR PBB SHADOW E&M-EST. PATIENT-LVL III: ICD-10-PCS | Mod: PBBFAC,,, | Performed by: PHYSICIAN ASSISTANT

## 2019-05-30 PROCEDURE — 76942 PR U/S GUIDANCE FOR NEEDLE GUIDANCE: ICD-10-PCS | Mod: 26,S$GLB,, | Performed by: PHYSICIAN ASSISTANT

## 2019-05-30 PROCEDURE — 99213 OFFICE O/P EST LOW 20 MIN: CPT | Mod: 25,S$GLB,, | Performed by: PHYSICIAN ASSISTANT

## 2019-05-30 RX ORDER — LIDOCAINE HYDROCHLORIDE 10 MG/ML
0.5 INJECTION, SOLUTION EPIDURAL; INFILTRATION; INTRACAUDAL; PERINEURAL ONCE
Status: COMPLETED | OUTPATIENT
Start: 2019-05-30 | End: 2019-05-30

## 2019-05-30 RX ORDER — DEXAMETHASONE SODIUM PHOSPHATE 4 MG/ML
4 INJECTION, SOLUTION INTRA-ARTICULAR; INTRALESIONAL; INTRAMUSCULAR; INTRAVENOUS; SOFT TISSUE
Status: COMPLETED | OUTPATIENT
Start: 2019-05-30 | End: 2019-05-30

## 2019-05-30 RX ADMIN — DEXAMETHASONE SODIUM PHOSPHATE 4 MG: 4 INJECTION, SOLUTION INTRA-ARTICULAR; INTRALESIONAL; INTRAMUSCULAR; INTRAVENOUS; SOFT TISSUE at 02:05

## 2019-05-30 RX ADMIN — LIDOCAINE HYDROCHLORIDE 5 MG: 10 INJECTION, SOLUTION EPIDURAL; INFILTRATION; INTRACAUDAL; PERINEURAL at 03:05

## 2019-05-30 NOTE — PROGRESS NOTES
Subjective:      Patient ID: Royal HALLEY Celaya III is a 19 y.o. male.    Chief Complaint: Pain of the Left Hand      HPI  Royal HALLEY Celaya III is a right hand dominant 19 y.o. male presenting today for left index finger swelling. Onset about 2 weeks ago. He denies injury. He does not recall anything puncturing the finger. He has swelling and pain of the finger. Pain is located over the volar aspect of the proximal phalanx and MCP joint. Denies triggering. Denies numbness/tingling. He is in school for music.     5/30/19  Pt presents for follow up left index finger pain and swelling. He was last seen 5/24/19. Treatment options were discussed including conservative treatment vs obtaining further imaging. He wished to proceed with MRI, however due to high co-pay he would now like to pursue conservative treatment first.       Review of patient's allergies indicates:   Allergen Reactions    Pcn [penicillins] Hives         Current Outpatient Medications   Medication Sig Dispense Refill    busPIRone (BUSPAR) 5 MG Tab Take 5 mg by mouth 2 (two) times daily.      propranolol (INDERAL) 40 MG tablet Take 40 mg by mouth once daily.       FLUoxetine (PROZAC) 40 MG capsule Take 1 capsule (40 mg total) by mouth once daily. 90 capsule 1    LORazepam (ATIVAN) 0.5 MG tablet Take 1 tablet (0.5 mg total) by mouth every 4 (four) hours as needed for Anxiety. 60 tablet 1     No current facility-administered medications for this visit.        Past Medical History:   Diagnosis Date    ADHD     Autism     Constipation - functional     Multiple nevi     Social anxiety disorder        Past Surgical History:   Procedure Laterality Date    TESTICLE SURGERY  2001    hydrocele       Review of Systems:  Constitutional: Negative for chills and fever.   Respiratory: Negative for cough and shortness of breath.    Gastrointestinal: Negative for nausea and vomiting.   Skin: Negative for rash.   Neurological: Negative for dizziness and  "headaches.   Psychiatric/Behavioral: Negative for depression.   MSK as in HPI       OBJECTIVE:     PHYSICAL EXAM:  /67 (BP Location: Left arm, Patient Position: Sitting, BP Method: Large (Automatic))   Pulse 67   Ht 5' 10" (1.778 m)   Wt 107.6 kg (237 lb 3.4 oz)   BMI 34.04 kg/m²     GEN:  NAD, well-developed, well-groomed.  NEURO: Awake, alert, and oriented. Normal attention and concentration.    PSYCH: Normal mood and affect. Behavior is normal.  HEENT: No cervical lymphadenopathy noted.  CARDIOVASCULAR: Radial pulses 2+ bilaterally. No LE edema noted.  PULMONARY: Breath sounds normal. No respiratory distress.  SKIN: Intact, no rashes.      MSK:   LUE:  Good active ROM of the wrist and fingers. There is decreased full flexion of the index finger. There is edema of the finger. He is ttp over the volar aspect of the proximal phalanx and MCP. There is no significant erythema present. No signs of infection. No triggering noted. AIN/PIN/Radial/Median/Ulnar Nerves assessed in isolation without deficit. Radial & Ulnar arteries palpated 2+. Capillary Refill <3s.      RADIOGRAPHS:  Xray left hand 5/23/19  FINDINGS:  There are no acute fractures or dislocations or osteoblastic or lytic lesions or significant erosive arthropathy.  Soft tissues are unremarkable.  Comments: I have personally reviewed the imaging and I agree with the above radiologist's report.    ASSESSMENT/PLAN:       ICD-10-CM ICD-9-CM   1. Swelling of left index finger M79.89 729.81       Orders Placed This Encounter    Ambulatory Referral to Physical/Occupational Therapy     Plan:   -left index trigger finger injection performed today   -OT ordered  -RTC about 4 wks, pt is leaving town for 5 wks 6/21        PROCEDURE:  I have explained the risks, benefits, and alternatives of the procedure in detail.  The patient voices understanding and all questions have been answered.  The patient agrees to proceed as planned. US guidance used. So after a " sterile prep of the skin in the normal fashion the left index flexor tendon sheath is injected from the volar approach using a 25 gauge needle with a combination of 0.5cc 1% plain lidocaine and 4 mg of dexamethasone.  The patient is cautioned and immediate relief of pain is secondary to the local anesthetic and will be temporary.  After the anesthetic wears off there may be a increase in pain that may last for a few hours or a few days and they should use ice to help alleviate this flair up of pain. Patient tolerated the procedure well.         The patient indicates understanding of these issues and agrees to the plan.    Norma Galdamez PA-C  Hand Clinic   [facility]  [place], [place]

## 2019-06-05 ENCOUNTER — CLINICAL SUPPORT (OUTPATIENT)
Dept: REHABILITATION | Facility: HOSPITAL | Age: 19
End: 2019-06-05
Attending: ORTHOPAEDIC SURGERY
Payer: COMMERCIAL

## 2019-06-05 DIAGNOSIS — M25.442 FINGER JOINT SWELLING, LEFT: ICD-10-CM

## 2019-06-05 DIAGNOSIS — M79.89 SWELLING OF FINGER, LEFT: ICD-10-CM

## 2019-06-05 DIAGNOSIS — R29.898 DECREASED PINCH STRENGTH: ICD-10-CM

## 2019-06-05 DIAGNOSIS — M79.645 FINGER PAIN, LEFT: ICD-10-CM

## 2019-06-05 PROBLEM — M79.644 FINGER PAIN, RIGHT: Status: ACTIVE | Noted: 2019-06-05

## 2019-06-05 PROCEDURE — 97165 OT EVAL LOW COMPLEX 30 MIN: CPT

## 2019-06-05 PROCEDURE — 97110 THERAPEUTIC EXERCISES: CPT

## 2019-06-05 NOTE — PATIENT INSTRUCTIONS
"[facility] - OCCUP[facility] TH[facility]  HOME EXERCISE PROGRAM     SOAK HAND IN HOT WATER OR USE HOT WET COMPRESS FOR 5-10 MIN BEFORE EXERCISES OR PRACTICE     COLD PACK X 10 MIN AT NIGHT FOR PAIN, SWELLING                     Complete the following exercises with 10 repetitions each, 3-4x/day.     AROM: DIP Flexion / Extension  Pinch middle knuckle to prevent bending. Bend end knuckle until stretch is felt.   Hold 3 seconds. Relax. Straighten finger as far as possible.    AROM: PIP Flexion / Extension  Pinch bottom knuckle  to prevent bending. Actively bend middle knuckle until stretch is felt.   Hold 3 seconds. Relax. Straighten finger as far as possible.    AROM: Isolated PIP Flexion  Bend only middle joint of your finger, keeping other fingers straight with other hand.    AROM: Isolated MCP Flexion / Extension ("Wave")   Bend only your large, bottom knuckles. Hold 3 seconds. Keep the tips of your fingers straight. Straighten fingers.    AROM: Isolated IPJ Flexion / Extension ("Hook")  Bend only your middle and end knuckles. Hold 3 seconds.   Straighten your fingers.       AROM: Composite Flexion / Extension ("Full Fist")  Bend every joint in your hand into a fist. Hold 3 seconds. Straighten your fingers.     AROM: Composte Extension ("Finger Lifts")  Lift your finger off of the table one at a time. Hold 3 seconds. Relax your finger.    AROM: Abduction / Adduction  With hand flat on table, spread all fingers apart, then bring them together as close as possible.    Copyright © [employer]. All rights reserved.     Therapist: NOVA Oden CHT    "

## 2019-06-10 ENCOUNTER — CLINICAL SUPPORT (OUTPATIENT)
Dept: REHABILITATION | Facility: HOSPITAL | Age: 19
End: 2019-06-10
Payer: COMMERCIAL

## 2019-06-10 DIAGNOSIS — R29.898 DECREASED PINCH STRENGTH: ICD-10-CM

## 2019-06-10 DIAGNOSIS — M25.442 FINGER JOINT SWELLING, LEFT: ICD-10-CM

## 2019-06-10 DIAGNOSIS — M79.645 FINGER PAIN, LEFT: ICD-10-CM

## 2019-06-10 DIAGNOSIS — M79.89 SWELLING OF FINGER, LEFT: ICD-10-CM

## 2019-06-10 PROCEDURE — 97110 THERAPEUTIC EXERCISES: CPT

## 2019-06-10 PROCEDURE — 97018 PARAFFIN BATH THERAPY: CPT | Mod: 59

## 2019-06-10 NOTE — PROGRESS NOTES
"  Occupational Therapy Daily Treatment Note     Date: 6/10/2019  Name: Royal HALLEY Celaya III  Clinic Number: [id number]    Medical Diagnosis: L  index finger swelling   Therapy Diagnosis:        Encounter Diagnoses   Name Primary?    Finger pain, left      Swelling of finger, left      Decreased pinch strength      Finger joint swelling, left        Physician: Barbi Duff, *     Physician Orders: Left index edema/pain   2x/wk x 6 weeks   Modalities prn     Surgical Procedure and Date: n/a   Evaluation Date: 6/5/2019     Plan of Care Certification Period: 8/3/2019   Date of Return to MD: 6/20/2019      Visit # / Visits authorized: 2 / 20  Insurance Authorization Period Expiration: 12/31/2019  NO FOTO      Time In:1025 am   Time Out: 1110 am   Total Billable Time: 45  minutes    Precautions:  Standard      Subjective     Pt reports: "It was more my wrist was sore than my fingers when I practiced the Bass, The finger sleeves were fine"   he was compliant with home exercise program given last session.   Response to previous treatment: improved mobility , less swelling   Functional change: able to practice Haines 3 hrs daily     Pain: 0/10  Location: left index finger       Objective     Leanna received the following supervised modalities after being cleared for contradictions for 10  minutes:   -Paraffin bath  x 10 min to left  hand(s) to increase blood flow, circulation and tissue elasticity prior to therex.     Leanna received therapeutic exercises for 35 minutes including:  --blocking FDP, FDS, isolated FDS, hook, wave, flat and full fist, ab/ad and digit extension with "place and hold" x 10 reps each.   - Added red theraputty and instructed/performed 2 and 3pt pinch,gross ,  raking, isolated finger flexion, isolated digit extension, composite digit extension "donut" x 10 reps each, 1-2 x daily until he leaves for camp/school then recommend 2x weekly for hand strengthening. Provided theraputty and written HEP. "      Reviewed edema control techniques and provided 2 edema sleeves for night use. Instructed to monitor for color change, numbness, coldness, if compression too tight to remove.     Pinch assessed 6/10 as follows:   2 pt R) 6 psi  L) 4 psi   3pt R) 6 psi  L) 5 psi     Home Exercises and Education Provided     Education provided:   - continue HEP  - theraputty exercises (red)   - Progress towards goals     Written Home Exercises Provided: Patient instructed to cont prior HEP.  Exercises were reviewed and Leanna was able to demonstrate them prior to the end of the session.  Leanna demonstrated good  understanding of the HEP provided.   .   See EMR under Patient Instructions for exercises provided prior visit. And this visit        Assessment     Leanna is progressing well towards his goals and there are no updates to goals at this time. Pt prognosis is Excellent.     Minimal fatigue noted with resistive exercises.  Full ROM obtained this day with decreased edema noted. Tolerated therex with min PIP joint pain.  Pt will continue to benefit from skilled outpatient occupational therapy to address the deficits listed in the problem list on initial evaluation to improve ROM, strength, pain management, /pinch strength, functional use, scar and edema management and to maximize pt's level of independence with ADLs in the home, work  and community environment.     Anticipated barriers to occupational therapy: Autism     Pt's spiritual, cultural and educational needs considered and pt agreeable to plan of care and goals.    The following goals were discussed with the patient and patient is in agreement with them as to be addressed in the treatment plan.     Goals:   Long/ Short Term Goals (6 weeks)   1)  Patient to be IND with HEP and modalities for pain management  2)  Increase ROM 3-5 degrees to increase functional hand use for ADLs/work/leisure activities  3)   Decrease edema .2-.3 mm to increase joint mobility /flexibility for  functional hand use.   4)  Assess pinch and set goals accordingly --met 6/10/2019  5)  Increase pinch 1-3 psi's for playing Haines, Piano         Plan   Recommend continued Outpatient Occupataional Therapy  1x week for 6 weeks to include the following interventions Paraffin, Manual therapy/joint mobilizations, Modalities for pain management, US 3 mhz, Therapeutic exercises/activities., Strengthening, Edema Control, Scar Management, Wound Care and Electrical Modalities.     Within the Certification Period/Plan of care expiration: 6/5/2019 to 8/5/2019        NOVA Oden, CHT

## 2019-06-13 ENCOUNTER — CLINICAL SUPPORT (OUTPATIENT)
Dept: REHABILITATION | Facility: HOSPITAL | Age: 19
End: 2019-06-13
Payer: COMMERCIAL

## 2019-06-13 DIAGNOSIS — M79.645 FINGER PAIN, LEFT: ICD-10-CM

## 2019-06-13 DIAGNOSIS — M25.442 FINGER JOINT SWELLING, LEFT: ICD-10-CM

## 2019-06-13 DIAGNOSIS — R29.898 DECREASED PINCH STRENGTH: ICD-10-CM

## 2019-06-13 DIAGNOSIS — M79.89 SWELLING OF FINGER, LEFT: ICD-10-CM

## 2019-06-13 PROCEDURE — 97110 THERAPEUTIC EXERCISES: CPT

## 2019-06-13 PROCEDURE — 97018 PARAFFIN BATH THERAPY: CPT | Mod: 59

## 2019-06-13 NOTE — PROGRESS NOTES
"  Occupational Therapy Daily Treatment Note     Date: 6/13/2019  Name: Royal HALLEY Celaya III  Clinic Number: [id number]    Medical Diagnosis: L  index finger swelling   Therapy Diagnosis:        Encounter Diagnoses   Name Primary?    Finger pain, left      Swelling of finger, left      Decreased pinch strength      Finger joint swelling, left        Physician: Barbi Duff, *     Physician Orders: Left index edema/pain   2x/wk x 6 weeks   Modalities prn     Surgical Procedure and Date: n/a   Evaluation Date: 6/5/2019     Plan of Care Certification Period: 8/3/2019   Date of Return to MD: 6/20/2019      Visit # / Visits authorized: 2 / 20  Insurance Authorization Period Expiration: 12/31/2019  NO FOTO      Time In:1025 am   Time Out: 1110 am   Total Billable Time: 45  minutes    Precautions:  Standard      Subjective     Pt reports: "It was more my wrist was sore than my fingers when I practiced the Bass, The finger sleeves were fine"   he was compliant with home exercise program given last session.   Response to previous treatment: improved mobility , less swelling   Functional change: able to practice Haines 3 hrs daily     Pain: 0/10  Location: left index finger       Objective     Leanna received the following supervised modalities after being cleared for contradictions for 10  minutes:   -Paraffin bath  x 10 min to left  hand(s) to increase blood flow, circulation and tissue elasticity prior to therex.     Leanna received therapeutic exercises for 35 minutes including:  --blocking FDP, FDS, isolated FDS, hook, wave, flat and full fist, ab/ad and digit extension with "place and hold" x 10 reps each.   - Added red theraputty and instructed/performed 2 and 3pt pinch,gross ,  raking, isolated finger flexion, isolated digit extension, composite digit extension "donut" x 10 reps each,.   -4# clothespin for 2 pt pinch with pom poms x 30 reps   - blue  digi flex for isolated / composite finger flexion x 10 reps   - " yellow digi extender for EDC/EI/EPL/EDM strengthening x 15 reps      Reviewed edema control techniques and provided 2 edema sleeves for night use. Instructed to monitor for color change, numbness, coldness, if compression too tight to remove.     Home Exercises and Education Provided     Education provided:   - continue HEP  - theraputty exercises (red)   - Progress towards goals     Written Home Exercises Provided: Patient instructed to cont prior HEP.  Exercises were reviewed and Leanna was able to demonstrate them prior to the end of the session.  Leanna demonstrated good  understanding of the HEP provided.   .   See EMR under Patient Instructions for exercises provided prior visit. And last visit.        Assessment     Leanna is progressing well towards his goals and there are no updates to goals at this time. Pt prognosis is Excellent.     Minimal fatigue noted with resistive exercises.  Full ROM obtained this day with decreased edema noted. Tolerated therex with no significant complaints of pain.  Progressing well.  Pt will continue to benefit from skilled outpatient occupational therapy to address the deficits listed in the problem list on initial evaluation to improve ROM, strength, pain management, /pinch strength, functional use, scar and edema management and to maximize pt's level of independence with ADLs in the home, work  and community environment.     Anticipated barriers to occupational therapy: Autism     Pt's spiritual, cultural and educational needs considered and pt agreeable to plan of care and goals.    The following goals were discussed with the patient and patient is in agreement with them as to be addressed in the treatment plan.     Goals:   Long/ Short Term Goals (6 weeks)   1)  Patient to be IND with HEP and modalities for pain management  2)  Increase ROM 3-5 degrees to increase functional hand use for ADLs/work/leisure activities  3)   Decrease edema .2-.3 mm to increase joint mobility  /flexibility for functional hand use.   4)  Assess pinch and set goals accordingly --met 6/10/2019  5)  Increase pinch 1-3 psi's for playing Haines, Piano         Plan   Recommend continued Outpatient Occupataional Therapy  1x week for 6 weeks to include the following interventions Paraffin, Manual therapy/joint mobilizations, Modalities for pain management, US 3 mhz, Therapeutic exercises/activities., Strengthening, Edema Control, Scar Management, Wound Care and Electrical Modalities.     Within the Certification Period/Plan of care expiration: 6/5/2019 to 8/5/2019        NOVA Oden, CHT

## 2019-06-17 ENCOUNTER — CLINICAL SUPPORT (OUTPATIENT)
Dept: REHABILITATION | Facility: HOSPITAL | Age: 19
End: 2019-06-17
Payer: COMMERCIAL

## 2019-06-17 DIAGNOSIS — R29.898 DECREASED PINCH STRENGTH: ICD-10-CM

## 2019-06-17 DIAGNOSIS — M79.645 FINGER PAIN, LEFT: ICD-10-CM

## 2019-06-17 DIAGNOSIS — M25.442 FINGER JOINT SWELLING, LEFT: ICD-10-CM

## 2019-06-17 DIAGNOSIS — M79.89 SWELLING OF FINGER, LEFT: ICD-10-CM

## 2019-06-17 PROCEDURE — 97018 PARAFFIN BATH THERAPY: CPT | Mod: 59

## 2019-06-17 PROCEDURE — 97110 THERAPEUTIC EXERCISES: CPT

## 2019-06-17 NOTE — PATIENT INSTRUCTIONS
[facility]  OCCUPATIONAL THERAPY  HOME EXERCISE PROGRAM     Complete the following strengthening exercises using 3-5 pound weight.  Do 2X10 repetitions of each, 3X per WEEK.     Resisted Forearm Rotation  Use a weight or a hammer. Slowly rotate hand to one side then the other.      Resisted Wrist Flexion  With palm up and weight in hand, bend wrist up. Return slowly.      Resisted Wrist Extension  With palm down and weight in hand, bend wrist up. Then bend wrist down.      Resisted Wrist Deviation  With thumb up and weight in hand, bend wrist up. Return slowly.    Copyright © I. All rights reserved.     Therapist: NOVA Oden CHT

## 2019-06-17 NOTE — PROGRESS NOTES
"  Occupational Therapy Daily Treatment Note     Date: 6/17/2019  Name: Royal HALLEY Celaya III  Clinic Number: [id number]    Medical Diagnosis: L  index finger swelling   Therapy Diagnosis:        Encounter Diagnoses   Name Primary?    Finger pain, left      Swelling of finger, left      Decreased pinch strength      Finger joint swelling, left        Physician: Barbi Duff, *     Physician Orders: Left index edema/pain   2x/wk x 6 weeks   Modalities prn     Surgical Procedure and Date: n/a   Evaluation Date: 6/5/2019     Plan of Care Certification Period: 8/3/2019   Date of Return to MD: 6/20/2019      Visit # / Visits authorized: 4 / 20  Insurance Authorization Period Expiration: 12/31/2019  NO FOTO      Time In:920 am   Time Out: 1005 am   Total Billable Time: 45  minutes    Precautions:  Standard      Subjective     Pt reports: "The swelling is less, and I'm trying to relax my wrist more, it gets tight when I play the Bass"   he was compliant with home exercise program given last session.   Response to previous treatment: improved mobility , less swelling   Functional change: able to practice Haines 3 hrs daily     Pain: 0/10  Location: left index finger       Objective     Leanna received the following supervised modalities after being cleared for contradictions for 10  minutes:   -Paraffin bath  x 10 min to left  hand(s) to increase blood flow, circulation and tissue elasticity prior to therex.     Leanna received therapeutic exercises for 35 minutes including:  --blocking FDP, FDS, isolated FDS, hook, wave, flat and full fist, ab/ad and digit extension with "place and hold" x 10 reps each.   - Upgraded to green theraputty (in clinic only) and performed 2 and 3pt pinch,gross ,  raking, isolated finger flexion, isolated digit extension, composite digit extension "donut" x 10 reps each,.   -4# clothespin for 2 pt pinch with pom poms x 30 reps   - blue  digi flex for isolated / composite finger flexion x 10 " reps   - yellow digi extender for EDC/EI/EPL/EDM strengthening x 15 reps   - wrist strengthening with 2# wrist flex, ext, RD, UD, and 1# hammer for sup/pron x 10 reps each          Home Exercises and Education Provided     Education provided:   - continue HEP  - theraputty exercises (red) at home   - provided wrist strengthening HEP 2-3 x weekly   - Progress towards goals     Written Home Exercises Provided: Patient instructed to cont prior HEP.  Exercises were reviewed and Leanna was able to demonstrate them prior to the end of the session.  Leanna demonstrated good  understanding of the HEP provided.   .   See EMR under Patient Instructions for exercises provided prior visit. And last visit.        Assessment     Leanna is progressing well towards his goals and there are no updates to goals at this time. Pt prognosis is Excellent.     Minimal fatigue noted in index  with resistive exercises.  Full ROM obtained this day with decreased edema noted. Tolerated therex with no significant complaints of pain.  Upgraded with wrist strengthening due to generalized hand / forearm weakness. Progressing well.  Pt will continue to benefit from skilled outpatient occupational therapy to address the deficits listed in the problem list on initial evaluation to improve ROM, strength, pain management, /pinch strength, functional use, scar and edema management and to maximize pt's level of independence with ADLs in the home, work  and community environment.     Anticipated barriers to occupational therapy: Autism     Pt's spiritual, cultural and educational needs considered and pt agreeable to plan of care and goals.    The following goals were discussed with the patient and patient is in agreement with them as to be addressed in the treatment plan.     Goals:   Long/ Short Term Goals (6 weeks)   1)  Patient to be IND with HEP and modalities for pain management  2)  Increase ROM 3-5 degrees to increase functional hand use for  ADLs/work/leisure activities  3)   Decrease edema .2-.3 mm to increase joint mobility /flexibility for functional hand use.   4)  Assess pinch and set goals accordingly --met 6/10/2019  5)  Increase pinch 1-3 psi's for playing Haines, Piano         Plan   Recommend continued Outpatient Occupataional Therapy  1x week for 6 weeks to include the following interventions Paraffin, Manual therapy/joint mobilizations, Modalities for pain management, US 3 mhz, Therapeutic exercises/activities., Strengthening, Edema Control, Scar Management, Wound Care and Electrical Modalities.     Within the Certification Period/Plan of care expiration: 6/5/2019 to 8/5/2019        NOVA Oden, ELLYNT

## 2019-06-20 ENCOUNTER — OFFICE VISIT (OUTPATIENT)
Dept: ORTHOPEDICS | Facility: CLINIC | Age: 19
End: 2019-06-20
Payer: COMMERCIAL

## 2019-06-20 ENCOUNTER — CLINICAL SUPPORT (OUTPATIENT)
Dept: REHABILITATION | Facility: HOSPITAL | Age: 19
End: 2019-06-20
Payer: COMMERCIAL

## 2019-06-20 DIAGNOSIS — M79.645 FINGER PAIN, LEFT: ICD-10-CM

## 2019-06-20 DIAGNOSIS — M25.442 FINGER JOINT SWELLING, LEFT: ICD-10-CM

## 2019-06-20 DIAGNOSIS — M79.645 FINGER PAIN, LEFT: Primary | ICD-10-CM

## 2019-06-20 DIAGNOSIS — R29.898 DECREASED PINCH STRENGTH: ICD-10-CM

## 2019-06-20 DIAGNOSIS — M79.89 SWELLING OF FINGER, LEFT: ICD-10-CM

## 2019-06-20 PROCEDURE — 99213 OFFICE O/P EST LOW 20 MIN: CPT | Mod: S$GLB,,, | Performed by: PHYSICIAN ASSISTANT

## 2019-06-20 PROCEDURE — 99999 PR PBB SHADOW E&M-EST. PATIENT-LVL II: ICD-10-PCS | Mod: PBBFAC,,, | Performed by: PHYSICIAN ASSISTANT

## 2019-06-20 PROCEDURE — 97018 PARAFFIN BATH THERAPY: CPT | Mod: 59

## 2019-06-20 PROCEDURE — 97110 THERAPEUTIC EXERCISES: CPT

## 2019-06-20 PROCEDURE — 99999 PR PBB SHADOW E&M-EST. PATIENT-LVL II: CPT | Mod: PBBFAC,,, | Performed by: PHYSICIAN ASSISTANT

## 2019-06-20 PROCEDURE — 99213 PR OFFICE/OUTPT VISIT, EST, LEVL III, 20-29 MIN: ICD-10-PCS | Mod: S$GLB,,, | Performed by: PHYSICIAN ASSISTANT

## 2019-06-20 NOTE — PROGRESS NOTES
"  Occupational Therapy Daily Treatment Note     Date: 6/20/2019  Name: Royal HALLEY Celaya III  Clinic Number: [id number]    Medical Diagnosis: L  index finger swelling   Therapy Diagnosis:        Encounter Diagnoses   Name Primary?    Finger pain, left      Swelling of finger, left      Decreased pinch strength      Finger joint swelling, left        Physician: Barbi Duff, *     Physician Orders: Left index edema/pain   2x/wk x 6 weeks   Modalities prn     Surgical Procedure and Date: n/a   Evaluation Date: 6/5/2019     Plan of Care Certification Period: 8/3/2019   Date of Return to MD: 6/20/2019      Visit # / Visits authorized: 4 / 20  Insurance Authorization Period Expiration: 12/31/2019  NO FOTO      Time In:920 am   Time Out: 1005 am   Total Billable Time: 45  minutes    Precautions:  Standard      Subjective     Pt reports: "The swelling is less, and I'm trying to relax my wrist more, it gets tight when I play the Bass"   he was compliant with home exercise program given last session.   Response to previous treatment: improved mobility , less swelling   Functional change: able to practice Haines 3 hrs daily     Pain: 0/10  Location: left index finger       Objective     Leanna received the following supervised modalities after being cleared for contradictions for 10  minutes:   -Paraffin bath  x 10 min to left  hand(s) to increase blood flow, circulation and tissue elasticity prior to therex.     Leanna received therapeutic exercises for 35 minutes including:  --blocking FDP, FDS, isolated FDS, hook, wave, flat and full fist, ab/ad and digit extension with "place and hold" x 10 reps each.   - Upgraded to green theraputty (in clinic only) and performed 2 and 3pt pinch,gross ,  raking, isolated finger flexion, isolated digit extension, composite digit extension "donut" x 10 reps each,.   -4# clothespin for 2 pt pinch with pom poms x 30 reps   - blue  digi flex for isolated / composite finger flexion x 10 " reps   - yellow digi extender for EDC/EI/EPL/EDM strengthening x 15 reps   - wrist strengthening with 2# wrist flex, ext, RD, UD, and 1# hammer for sup/pron x 10 reps each      Current status as follows:   Edema   PP 7.5 (-0.1)   PIP 6.8 (+0.3)   MP 5.8 cm   DIP 5.0 cm   DP 4.7 cm     ROM   MP 0/75 (+13)   PIP 0/100 (+15)   DIP 0/75 (+15)     Pinch strength   2 pt pinch : 4 psi   3 pt pinch :  5 psi     Home Exercises and Education Provided     Education provided:   - continue HEP  - theraputty exercises (red) at home ; provided with green theraputty to upgrade HEP as able   - provided wrist strengthening HEP 2-3 x weekly   - Progress towards goals     Written Home Exercises Provided: Patient instructed to cont prior HEP.  Exercises were reviewed and Leanna was able to demonstrate them prior to the end of the session.  Leanna demonstrated good  understanding of the HEP provided.   .   See EMR under Patient Instructions for exercises provided prior visit. And last visit.        Assessment     Leanna is progressing well towards his goals and there are no updates to goals at this time. Pt prognosis is Excellent.     Decrease muscle fatigue noted in index  with resistive exercises.  Full ROM obtained this day with decreased edema noted. Tolerated therex with no significant complaints of pain.  Upgraded with wrist strengthening due to generalized hand / forearm weakness. Progressing well.  Pt will continue to benefit from skilled outpatient occupational therapy to address the deficits listed in the problem list on initial evaluation to improve ROM, strength, pain management, /pinch strength, functional use, scar and edema management and to maximize pt's level of independence with ADLs in the home, work  and community environment.     Anticipated barriers to occupational therapy: Autism     Pt's spiritual, cultural and educational needs considered and pt agreeable to plan of care and goals.    The following goals were  discussed with the patient and patient is in agreement with them as to be addressed in the treatment plan.     Goals:   Long/ Short Term Goals (6 weeks)   1)  Patient to be IND with HEP and modalities for pain management--met  2)  Increase ROM 3-5 degrees to increase functional hand use for ADLs/work/leisure activities--met  3)   Decrease edema .2-.3 mm to increase joint mobility /flexibility for functional hand use.   4)  Assess pinch and set goals accordingly --met 6/10/2019  5)  Increase pinch 1-3 psi's for playing Haines, Piano         Plan   Recommend continued Outpatient Occupataional Therapy  1x week for 6 weeks to include the following interventions Paraffin, Manual therapy/joint mobilizations, Modalities for pain management, US 3 mhz, Therapeutic exercises/activities., Strengthening, Edema Control, Scar Management, Wound Care and Electrical Modalities.     Within the Certification Period/Plan of care expiration: 6/5/2019 to 8/5/2019        NOVA Oden, ELLYNT

## 2019-06-20 NOTE — PROGRESS NOTES
Subjective:      Patient ID: Royal HALLEY Celaya III is a 19 y.o. male.    Chief Complaint: No chief complaint on file.      HPI  Royal HALLEY Celaya III is a right hand dominant 19 y.o. male presenting today for left index finger swelling. Onset about 2 weeks ago. He denies injury. He does not recall anything puncturing the finger. He has swelling and pain of the finger. Pain is located over the volar aspect of the proximal phalanx and MCP joint. Denies triggering. Denies numbness/tingling. He is in school for music.     5/30/19  Pt presents for follow up left index finger pain and swelling. He was last seen 5/24/19. Treatment options were discussed including conservative treatment vs obtaining further imaging. He wished to proceed with MRI, however due to high co-pay he would now like to pursue conservative treatment first.     6/20/19  Pt presents for follow up. He has been attending therapy. He notes gradual improvement in symptoms including ROM and pain. He is able to do all regular ADLs including playing his instrument without difficulty or pain. He does note discomfort with some of his OT exercises.     Review of patient's allergies indicates:   Allergen Reactions    Pcn [penicillins] Hives         Current Outpatient Medications   Medication Sig Dispense Refill    busPIRone (BUSPAR) 5 MG Tab Take 5 mg by mouth 2 (two) times daily.      FLUoxetine (PROZAC) 40 MG capsule Take 1 capsule (40 mg total) by mouth once daily. 90 capsule 1    LORazepam (ATIVAN) 0.5 MG tablet Take 1 tablet (0.5 mg total) by mouth every 4 (four) hours as needed for Anxiety. 60 tablet 1    propranolol (INDERAL) 40 MG tablet Take 40 mg by mouth once daily.        No current facility-administered medications for this visit.        Past Medical History:   Diagnosis Date    ADHD     Autism     Constipation - functional     Multiple nevi     Social anxiety disorder        Past Surgical History:   Procedure Laterality Date    TESTICLE  SURGERY  2001    hydrocele       Review of Systems:  Constitutional: Negative for chills and fever.   Respiratory: Negative for cough and shortness of breath.    Gastrointestinal: Negative for nausea and vomiting.   Skin: Negative for rash.   Neurological: Negative for dizziness and headaches.   Psychiatric/Behavioral: Negative for depression.   MSK as in HPI       OBJECTIVE:     PHYSICAL EXAM:  There were no vitals taken for this visit.    GEN:  NAD, well-developed, well-groomed.  NEURO: Awake, alert, and oriented. Normal attention and concentration.    PSYCH: Normal mood and affect. Behavior is normal.  HEENT: No cervical lymphadenopathy noted.  CARDIOVASCULAR: Radial pulses 2+ bilaterally. No LE edema noted.  PULMONARY: Breath sounds normal. No respiratory distress.  SKIN: Intact, no rashes.      MSK:   LUE:  Good active ROM of the wrist and fingers. He has mild ttp over the volar aspect of the proximal phalanx and MCP. There is no significant erythema present. No signs of infection. No triggering noted. AIN/PIN/Radial/Median/Ulnar Nerves assessed in isolation without deficit. Radial & Ulnar arteries palpated 2+. Capillary Refill <3s.      RADIOGRAPHS:  Xray left hand 5/23/19  FINDINGS:  There are no acute fractures or dislocations or osteoblastic or lytic lesions or significant erosive arthropathy.  Soft tissues are unremarkable.  Comments: I have personally reviewed the imaging and I agree with the above radiologist's report.    ASSESSMENT/PLAN:       ICD-10-CM ICD-9-CM   1. Finger pain, left M79.645 729.5     Plan:   -continue OT/HEP  -RTC as needed, if symptoms worsen can consider repeat injection       The patient indicates understanding of these issues and agrees to the plan.    Norma Galdamez PA-C  [facility]   [facility]  [place], [place]

## 2019-11-25 ENCOUNTER — LAB VISIT (OUTPATIENT)
Dept: LAB | Facility: HOSPITAL | Age: 19
End: 2019-11-25
Payer: COMMERCIAL

## 2019-11-25 ENCOUNTER — OFFICE VISIT (OUTPATIENT)
Dept: HEPATOLOGY | Facility: CLINIC | Age: 19
End: 2019-11-25
Payer: COMMERCIAL

## 2019-11-25 VITALS
TEMPERATURE: 99 F | HEIGHT: 70 IN | WEIGHT: 266.75 LBS | OXYGEN SATURATION: 95 % | HEART RATE: 84 BPM | DIASTOLIC BLOOD PRESSURE: 64 MMHG | BODY MASS INDEX: 38.19 KG/M2 | SYSTOLIC BLOOD PRESSURE: 107 MMHG

## 2019-11-25 DIAGNOSIS — K76.0 NAFLD (NONALCOHOLIC FATTY LIVER DISEASE): Primary | ICD-10-CM

## 2019-11-25 DIAGNOSIS — K76.0 NAFLD (NONALCOHOLIC FATTY LIVER DISEASE): ICD-10-CM

## 2019-11-25 DIAGNOSIS — R74.01 ELEVATED ALT MEASUREMENT: ICD-10-CM

## 2019-11-25 DIAGNOSIS — E88.01 LOW PLASMA ALPHA-1 ANTITRYPSIN: ICD-10-CM

## 2019-11-25 LAB
ALBUMIN SERPL BCP-MCNC: 4.3 G/DL (ref 3.5–5.2)
ALP SERPL-CCNC: 96 U/L (ref 55–135)
ALT SERPL W/O P-5'-P-CCNC: 36 U/L (ref 10–44)
AST SERPL-CCNC: 26 U/L (ref 10–40)
BILIRUB DIRECT SERPL-MCNC: <0.1 MG/DL (ref 0.1–0.3)
BILIRUB SERPL-MCNC: 0.3 MG/DL (ref 0.1–1)
PROT SERPL-MCNC: 7.5 G/DL (ref 6–8.4)

## 2019-11-25 PROCEDURE — 80076 HEPATIC FUNCTION PANEL: CPT

## 2019-11-25 PROCEDURE — 99999 PR PBB SHADOW E&M-EST. PATIENT-LVL IV: ICD-10-PCS | Mod: PBBFAC,,, | Performed by: NURSE PRACTITIONER

## 2019-11-25 PROCEDURE — 3008F PR BODY MASS INDEX (BMI) DOCUMENTED: ICD-10-PCS | Mod: CPTII,S$GLB,, | Performed by: NURSE PRACTITIONER

## 2019-11-25 PROCEDURE — 3008F BODY MASS INDEX DOCD: CPT | Mod: CPTII,S$GLB,, | Performed by: NURSE PRACTITIONER

## 2019-11-25 PROCEDURE — 99213 OFFICE O/P EST LOW 20 MIN: CPT | Mod: S$GLB,,, | Performed by: NURSE PRACTITIONER

## 2019-11-25 PROCEDURE — 99999 PR PBB SHADOW E&M-EST. PATIENT-LVL IV: CPT | Mod: PBBFAC,,, | Performed by: NURSE PRACTITIONER

## 2019-11-25 PROCEDURE — 99213 PR OFFICE/OUTPT VISIT, EST, LEVL III, 20-29 MIN: ICD-10-PCS | Mod: S$GLB,,, | Performed by: NURSE PRACTITIONER

## 2019-11-25 PROCEDURE — 36415 COLL VENOUS BLD VENIPUNCTURE: CPT

## 2019-11-25 NOTE — PROGRESS NOTES
[facility] - Established Patient    Last Clinic Visit: 5/16/19    CHIEF COMPLAINT: Follow-up for fatty liver     HPI: This is a 19 y.o. White male here for follow-up for fatty liver.    Initial referral for mildly elevated ALT, 50-60.  Serological workup has been negative for Santosh's, hemochromatosis, autoimmune etiology, and viral hepatitis. NANDO-D negative. A1AT phenotype MZ and level noted to be low, 72.     Fibroscan 5/16/19:   KPa = 5.4, F0-F1, no-mild fibrosis  CAP = 319, S3 or >67% steatosis    Updates on risk factors for fatty liver:     · Weight -- Body mass index is 38.28 kg/m².                        · Cholesterol -- previously well controlled earlier this year   · Alcohol use -- none  · BP well controlled, no DM     His transaminases normalized in May though weight is currently up almost 30 lb since last visit.   His mother is with him today. She acknowledges his depression, seasonal affective disorder. He lives alone attending college in [place]. He admits that food is his source of comfort and diet is still poor. They plan to follow-up with his psychiatrist soon.      Denies symptoms of hepatic decompensation including jaundice, dark urine, hematemesis, melena, slowed mentation, abdominal distention, or lower extremity edema.            Review of patient's allergies indicates:   Allergen Reactions    Pcn [penicillins] Hives        Current Outpatient Medications on File Prior to Visit   Medication Sig Dispense Refill    busPIRone (BUSPAR) 5 MG Tab Take 5 mg by mouth 2 (two) times daily.      propranolol (INDERAL) 40 MG tablet Take 40 mg by mouth once daily.       FLUoxetine (PROZAC) 40 MG capsule Take 1 capsule (40 mg total) by mouth once daily. 90 capsule 1    LORazepam (ATIVAN) 0.5 MG tablet Take 1 tablet (0.5 mg total) by mouth every 4 (four) hours as needed for Anxiety. 60 tablet 1     No current facility-administered medications on file prior to visit.          PMHX:  has a  past medical history of ADHD, Autism, Constipation - functional, Multiple nevi, and Social anxiety disorder.    PSHX:  has a past surgical history that includes Testicle surgery (2001).    FAMILY HISTORY:   Family History   Problem Relation Age of Onset    Nevi Father     Hyperlipidemia Father     Hypertension Father     Mental illness Father     Asthma Sister     Blindness Maternal Grandmother     Glaucoma Maternal Grandmother     Alzheimer's disease Maternal Grandmother     Cataracts Maternal Grandfather     Cancer Maternal Grandfather         SKIN CA    Heart disease Paternal Grandmother     Anxiety disorder Paternal Grandfather     Cancer Paternal Grandfather         prostate CA    Amblyopia Neg Hx     Diabetes Neg Hx     Retinal detachment Neg Hx     Strabismus Neg Hx     Cirrhosis Neg Hx        SOCIAL HISTORY:   Social History     Tobacco Use   Smoking Status Passive Smoke Exposure - Never Smoker   Smokeless Tobacco Never Used   Tobacco Comment    Dad smokes a pipe       Social History     Substance and Sexual Activity   Alcohol Use Not Currently       Social History     Substance and Sexual Activity   Drug Use No         Review of Systems   Constitutional: Negative for appetite change, chills, fatigue, fever. (+) weight gain   Eyes: Negative for visual disturbance.   Respiratory: Negative for cough and shortness of breath.    Cardiovascular: Negative for chest pain, palpitations and leg swelling.   Gastrointestinal: Negative for abdominal distention, abdominal pain, blood in stool, constipation, diarrhea, nausea and vomiting. No change in stool color.  Genitourinary: Negative for dysuria and hematuria. Denies dark urine.   Musculoskeletal: Negative for arthralgias, gait problem, joint swelling and myalgias.   Skin: Negative for color change, rash and wound. Denies itching.   Neurological: Negative for dizziness, tremors, speech difficulty, and headaches.   Hematological: Does not  "bruise/bleed easily.   Psychiatric/Behavioral: Negative for confusion, decreased concentration and sleep disturbance. Denies memory loss. Denies depression. The patient is not nervous/anxious.        Physical Exam   Constitutional: Well-nourished. No distress. Alert and oriented to person, place, and time.  Eyes: No scleral icterus.   Cardiovascular: Normal rate, regular rhythm and normal heart sounds. No murmur heard.  Pulmonary/Chest: Respiratory effort and breath sounds normal. No respiratory distress.   Abdominal: Soft, non-tender. No distension; no ascites appreciated. There is no palpable hepatosplenomegaly. No hernia or mass.   Musculoskeletal: No edema.   Neurological: No tremor. Coordination and gait normal. No asterixis.    Skin: Skin is warm and dry. No rash or erythema. No jaundice. No telangiectasias or palmar erythema noted.  Psychiatric: Normal mood and affect. Speech, behavior, and thought content normal. No depression or anxiety noted.       /64 (BP Location: Right arm, Patient Position: Sitting, BP Method: Medium (Automatic))   Pulse 84   Temp 98.8 °F (37.1 °C) (Oral)   Ht 5' 10" (1.778 m)   Wt 121 kg (266 lb 12.1 oz)   SpO2 95%   BMI 38.28 kg/m²       LABS:  Lab Results   Component Value Date    WBC 6.01 03/11/2019    HGB 13.5 (L) 03/11/2019    HCT 41.7 03/11/2019     03/11/2019     03/04/2019    K 4.2 03/04/2019    CREATININE 0.8 03/04/2019    ALT 32 05/16/2019    AST 27 05/16/2019    ALKPHOS 88 05/16/2019    BILITOT 0.3 05/16/2019    BILIDIR 0.1 05/16/2019    ALBUMIN 4.3 05/16/2019    SMOOTHMUSCAB Negative 1:40 05/16/2019    AMAIFA Negative 1:40 05/16/2019    IGGSERUM 938 05/16/2019    ANASCREEN Negative <1:160 05/16/2019    FERRITIN 123 05/16/2019    FESATURATED 19 (L) 05/16/2019    CNQFF8IUIDUT MZ 05/16/2019    IVBUB5HFEUHI 72 (L) 05/16/2019    CERULOPLSM 30.0 05/16/2019    CHOL 152 03/04/2019    TRIG 83 03/04/2019    LDLCALC 87.4 03/04/2019    HGBA1C 5.1 03/04/2019 "       No results found for: HEPAIGG    Hepatitis B Surface Ag   Date Value Ref Range Status   05/16/2019 Negative  Final     No results found for: HEPBCAB  No results found for: HEPBSAB  Hepatitis C Ab   Date Value Ref Range Status   05/16/2019 Negative  Final     Immunization History   Administered Date(s) Administered    DTaP 1998, 1998, 1998, 05/10/1999, 10/08/2002    HIB 1998, 1998, 1998, 05/10/1999    HPV Quadrivalent 12/20/2012, 02/27/2013, 06/20/2013    Hepatitis A, Pediatric/Adolescent, 2 Dose 02/19/2015    Hepatitis B, Pediatric/Adolescent 1998, 1998, 1998    IPV 1998, 1998, 02/01/1999, 02/19/2002    Influenza 12/15/2008, 10/15/2010, 11/07/2011, 12/20/2012, 03/06/2014    Influenza - Quadrivalent - Intranasal (2-49 years old) 02/19/2015, 02/22/2016    Influenza - Trivalent - PF (ADULT) 03/06/2014    Influenza Split 12/20/2012    MMR 02/01/1999, 02/19/2002    Meningococcal Conjugate 03/06/2014    Meningococcal Conjugate (MCV4P) 08/27/2009, 03/06/2014    Tdap 08/27/2009    Varicella 02/01/1999, 08/27/2009          DIAGNOSTIC STUDIES:  ABD. U/S  Results for orders placed during the hospital encounter of 03/13/19   US Abdomen Complete    Narrative EXAMINATION:  US ABDOMEN COMPLETE    CLINICAL HISTORY:  Nonspecific elevation of levels of transaminase and lactic acid dehydrogenase (ldh)    TECHNIQUE:  Complete abdominal ultrasound (including pancreas, aorta, liver, gallbladder, common bile duct, IVC, kidneys, and spleen) was performed.    COMPARISON:  None    FINDINGS:  Pancreas: The visualized portions of pancreas appear normal.    Aorta: No aneurysm.    Liver: 17.5 cm, enlarged in size. Homogeneous parenchymal echotexture. No focal lesions.    Gallbladder: No calculi, wall thickening, or pericholecystic fluid.  Negative sonographic Alex's sign.    Biliary system: 3 mm common bile duct.  No intrahepatic ductal  dilatation.    Inferior vena cava: Normal in appearance.    Right kidney: 10.4 cm. No hydronephrosis.    Left kidney: 11.5 cm. No hydronephrosis.    Spleen: 11.9 x 4.7 cm.  Normal in size with homogeneous echotexture.    Miscellaneous: No ascites.      Impression Hepatomegaly.    Electronically signed by resident: Lacy Linton  Date:    03/13/2019  Time:    16:07    Electronically signed by: Gage Salgado MD  Date:    03/13/2019  Time:    16:40         ASSESSMENT / PLAN:  19 y.o. White male with:    1. Fatty liver   -- Await labs from today to reassess liver enzymes   -- Strongly encouraged lifestyle modifications for fatty liver including weight loss with diet and exercise. Low carbohydrate, low sugar diet.   -- Maintain good control of blood pressure, cholesterol, blood sugar. Continue f/u with PCP for monitoring.   -- Can repeat Fibroscan at next visit to reassess fibrosis staging     2. Body mass index is 38.28 kg/m².  -- We discussed the need for dietary changes in detail. Recommend he start by cutting all sugary beverages first. Reviewed tips when ordering fast food / take-out.   -- Agree with psychiatry f/u as food appears to be source of comfort and highly linked to depression  -- Offered all available resources including referral to bariatric medicine, dietician, medical fitness program      3. A1AT phenotype MZ, low A1AT level         Return to clinic in 6 months.         EDUCATION / DISCUSSION:   We discussed the manifestations of fatty liver. At this time there is no FDA approved therapy for fatty liver. The patient and I discussed the importance of lifestyle modifications such as diet, exercise, and weight loss for management of fatty liver. We reviewed modification of risk factors such as hypertension, hyperlipidemia, and diabetes mellitus / impaired fasting glucose. Discussed that excessive alcohol consumption can also cause fatty liver. We discussed a low carb, low sugar diet and a goal of 30  minutes of exercise 5 times per week. Patient is aware that fatty liver can progress to steatohepatitis and possibly to cirrhosis, putting one at increased risk for liver cancer or liver failure.         Thank you for allowing me to participate in the care of CLAUDIA James III-REBECCA  Hepatology and Liver Transplant

## 2019-11-25 NOTE — PATIENT INSTRUCTIONS
1. Labs today pending, will let you know results  2. Follow-up in 6 months           Tips for low/moderate carbohydrate diet:  3 meals a day made up of the following:  -- Unlimited green vegetables, tomatoes, mushrooms, spaghetti squash, cauliflower, meat, poultry, seafood, eggs and hard cheeses.   -- Milk and plain yogurt  -- Dressings, seasonings, condiments, etc should have less than 2 g sugars.   -- Beans (1-1.5 cups) or nuts (1/4 cup): can have 1 x a day.   -- 1-2 servings of citrus fruits, berries, pineapple or melon a day (1/2 cup)  -- Avoid fried foods    *No grains, rice, pasta, potatoes, bread, corn, peas, oatmeal, grits, tortillas, crackers, chips  *No soda, sweet tea, juices or lemonade    Try www.dietdoctor.com for recipes (moderate carb intake)

## 2020-02-27 ENCOUNTER — TELEPHONE (OUTPATIENT)
Dept: HEPATOLOGY | Facility: CLINIC | Age: 20
End: 2020-02-27

## 2020-02-28 ENCOUNTER — PATIENT MESSAGE (OUTPATIENT)
Dept: INTERNAL MEDICINE | Facility: CLINIC | Age: 20
End: 2020-02-28

## 2020-02-28 DIAGNOSIS — Z00.00 ANNUAL PHYSICAL EXAM: Primary | ICD-10-CM

## 2020-02-29 ENCOUNTER — PATIENT MESSAGE (OUTPATIENT)
Dept: INTERNAL MEDICINE | Facility: CLINIC | Age: 20
End: 2020-02-29

## 2020-03-05 ENCOUNTER — LAB VISIT (OUTPATIENT)
Dept: LAB | Facility: HOSPITAL | Age: 20
End: 2020-03-05
Attending: INTERNAL MEDICINE
Payer: COMMERCIAL

## 2020-03-05 DIAGNOSIS — Z00.00 ANNUAL PHYSICAL EXAM: ICD-10-CM

## 2020-03-05 LAB
ALBUMIN SERPL BCP-MCNC: 4 G/DL (ref 3.5–5.2)
ALP SERPL-CCNC: 99 U/L (ref 55–135)
ALT SERPL W/O P-5'-P-CCNC: 39 U/L (ref 10–44)
ANION GAP SERPL CALC-SCNC: 8 MMOL/L (ref 8–16)
AST SERPL-CCNC: 26 U/L (ref 10–40)
BASOPHILS # BLD AUTO: 0.05 K/UL (ref 0–0.2)
BASOPHILS NFR BLD: 0.7 % (ref 0–1.9)
BILIRUB SERPL-MCNC: 0.3 MG/DL (ref 0.1–1)
BUN SERPL-MCNC: 14 MG/DL (ref 6–20)
CALCIUM SERPL-MCNC: 9.3 MG/DL (ref 8.7–10.5)
CHLORIDE SERPL-SCNC: 105 MMOL/L (ref 95–110)
CHOLEST SERPL-MCNC: 173 MG/DL (ref 120–199)
CHOLEST/HDLC SERPL: 2.8 {RATIO} (ref 2–5)
CO2 SERPL-SCNC: 25 MMOL/L (ref 23–29)
CREAT SERPL-MCNC: 0.8 MG/DL (ref 0.5–1.4)
DIFFERENTIAL METHOD: ABNORMAL
EOSINOPHIL # BLD AUTO: 0.2 K/UL (ref 0–0.5)
EOSINOPHIL NFR BLD: 2.4 % (ref 0–8)
ERYTHROCYTE [DISTWIDTH] IN BLOOD BY AUTOMATED COUNT: 12.9 % (ref 11.5–14.5)
EST. GFR  (AFRICAN AMERICAN): >60 ML/MIN/1.73 M^2
EST. GFR  (NON AFRICAN AMERICAN): >60 ML/MIN/1.73 M^2
ESTIMATED AVG GLUCOSE: 108 MG/DL (ref 68–131)
GLUCOSE SERPL-MCNC: 93 MG/DL (ref 70–110)
HBA1C MFR BLD HPLC: 5.4 % (ref 4–5.6)
HCT VFR BLD AUTO: 42 % (ref 40–54)
HDLC SERPL-MCNC: 62 MG/DL (ref 40–75)
HDLC SERPL: 35.8 % (ref 20–50)
HGB BLD-MCNC: 13.1 G/DL (ref 14–18)
IMM GRANULOCYTES # BLD AUTO: 0.06 K/UL (ref 0–0.04)
IMM GRANULOCYTES NFR BLD AUTO: 0.8 % (ref 0–0.5)
LDLC SERPL CALC-MCNC: 96.4 MG/DL (ref 63–159)
LYMPHOCYTES # BLD AUTO: 2 K/UL (ref 1–4.8)
LYMPHOCYTES NFR BLD: 27.2 % (ref 18–48)
MCH RBC QN AUTO: 27.6 PG (ref 27–31)
MCHC RBC AUTO-ENTMCNC: 31.2 G/DL (ref 32–36)
MCV RBC AUTO: 89 FL (ref 82–98)
MONOCYTES # BLD AUTO: 0.8 K/UL (ref 0.3–1)
MONOCYTES NFR BLD: 10.4 % (ref 4–15)
NEUTROPHILS # BLD AUTO: 4.4 K/UL (ref 1.8–7.7)
NEUTROPHILS NFR BLD: 58.5 % (ref 38–73)
NONHDLC SERPL-MCNC: 111 MG/DL
NRBC BLD-RTO: 0 /100 WBC
PLATELET # BLD AUTO: 299 K/UL (ref 150–350)
PMV BLD AUTO: 10.1 FL (ref 9.2–12.9)
POTASSIUM SERPL-SCNC: 4.1 MMOL/L (ref 3.5–5.1)
PROT SERPL-MCNC: 7.3 G/DL (ref 6–8.4)
RBC # BLD AUTO: 4.74 M/UL (ref 4.6–6.2)
SODIUM SERPL-SCNC: 138 MMOL/L (ref 136–145)
TRIGL SERPL-MCNC: 73 MG/DL (ref 30–150)
TSH SERPL DL<=0.005 MIU/L-ACNC: 1.29 UIU/ML (ref 0.4–4)
WBC # BLD AUTO: 7.49 K/UL (ref 3.9–12.7)

## 2020-03-05 PROCEDURE — 80053 COMPREHEN METABOLIC PANEL: CPT

## 2020-03-05 PROCEDURE — 84443 ASSAY THYROID STIM HORMONE: CPT

## 2020-03-05 PROCEDURE — 83036 HEMOGLOBIN GLYCOSYLATED A1C: CPT

## 2020-03-05 PROCEDURE — 80061 LIPID PANEL: CPT

## 2020-03-05 PROCEDURE — 85025 COMPLETE CBC W/AUTO DIFF WBC: CPT

## 2020-03-05 PROCEDURE — 36415 COLL VENOUS BLD VENIPUNCTURE: CPT

## 2020-03-11 ENCOUNTER — OFFICE VISIT (OUTPATIENT)
Dept: INTERNAL MEDICINE | Facility: CLINIC | Age: 20
End: 2020-03-11
Payer: COMMERCIAL

## 2020-03-11 ENCOUNTER — LAB VISIT (OUTPATIENT)
Dept: LAB | Facility: HOSPITAL | Age: 20
End: 2020-03-11
Attending: INTERNAL MEDICINE
Payer: COMMERCIAL

## 2020-03-11 ENCOUNTER — TELEPHONE (OUTPATIENT)
Dept: INTERNAL MEDICINE | Facility: CLINIC | Age: 20
End: 2020-03-11

## 2020-03-11 VITALS
TEMPERATURE: 98 F | HEIGHT: 70 IN | SYSTOLIC BLOOD PRESSURE: 110 MMHG | HEART RATE: 87 BPM | DIASTOLIC BLOOD PRESSURE: 82 MMHG | BODY MASS INDEX: 39.8 KG/M2 | WEIGHT: 278 LBS

## 2020-03-11 DIAGNOSIS — Z00.00 ANNUAL PHYSICAL EXAM: Primary | ICD-10-CM

## 2020-03-11 DIAGNOSIS — Z00.00 ANNUAL PHYSICAL EXAM: ICD-10-CM

## 2020-03-11 DIAGNOSIS — E66.01 SEVERE OBESITY (BMI 35.0-39.9) WITH COMORBIDITY: ICD-10-CM

## 2020-03-11 DIAGNOSIS — K76.0 NAFLD (NONALCOHOLIC FATTY LIVER DISEASE): ICD-10-CM

## 2020-03-11 DIAGNOSIS — F41.1 GAD (GENERALIZED ANXIETY DISORDER): ICD-10-CM

## 2020-03-11 PROBLEM — M79.89 SWELLING OF FINGER, LEFT: Status: RESOLVED | Noted: 2019-06-05 | Resolved: 2020-03-11

## 2020-03-11 PROBLEM — R29.898 DECREASED PINCH STRENGTH: Status: RESOLVED | Noted: 2019-06-05 | Resolved: 2020-03-11

## 2020-03-11 PROBLEM — M25.442 FINGER JOINT SWELLING, LEFT: Status: RESOLVED | Noted: 2019-06-10 | Resolved: 2020-03-11

## 2020-03-11 PROBLEM — M79.645 FINGER PAIN, LEFT: Status: RESOLVED | Noted: 2019-06-05 | Resolved: 2020-03-11

## 2020-03-11 LAB
BASOPHILS # BLD AUTO: 0.07 K/UL (ref 0–0.2)
BASOPHILS NFR BLD: 0.8 % (ref 0–1.9)
DIFFERENTIAL METHOD: ABNORMAL
EOSINOPHIL # BLD AUTO: 0.2 K/UL (ref 0–0.5)
EOSINOPHIL NFR BLD: 2.2 % (ref 0–8)
ERYTHROCYTE [DISTWIDTH] IN BLOOD BY AUTOMATED COUNT: 13.1 % (ref 11.5–14.5)
HCT VFR BLD AUTO: 42.4 % (ref 40–54)
HGB BLD-MCNC: 13.5 G/DL (ref 14–18)
IMM GRANULOCYTES # BLD AUTO: 0.11 K/UL (ref 0–0.04)
IMM GRANULOCYTES NFR BLD AUTO: 1.2 % (ref 0–0.5)
LYMPHOCYTES # BLD AUTO: 2.4 K/UL (ref 1–4.8)
LYMPHOCYTES NFR BLD: 26.4 % (ref 18–48)
MCH RBC QN AUTO: 27.8 PG (ref 27–31)
MCHC RBC AUTO-ENTMCNC: 31.8 G/DL (ref 32–36)
MCV RBC AUTO: 87 FL (ref 82–98)
MONOCYTES # BLD AUTO: 0.9 K/UL (ref 0.3–1)
MONOCYTES NFR BLD: 10.3 % (ref 4–15)
NEUTROPHILS # BLD AUTO: 5.3 K/UL (ref 1.8–7.7)
NEUTROPHILS NFR BLD: 59.1 % (ref 38–73)
NRBC BLD-RTO: 0 /100 WBC
PLATELET # BLD AUTO: 308 K/UL (ref 150–350)
PMV BLD AUTO: 10.1 FL (ref 9.2–12.9)
RBC # BLD AUTO: 4.86 M/UL (ref 4.6–6.2)
WBC # BLD AUTO: 8.9 K/UL (ref 3.9–12.7)

## 2020-03-11 PROCEDURE — 85025 COMPLETE CBC W/AUTO DIFF WBC: CPT

## 2020-03-11 PROCEDURE — 99999 PR PBB SHADOW E&M-EST. PATIENT-LVL III: ICD-10-PCS | Mod: PBBFAC,,, | Performed by: INTERNAL MEDICINE

## 2020-03-11 PROCEDURE — 99999 PR PBB SHADOW E&M-EST. PATIENT-LVL III: CPT | Mod: PBBFAC,,, | Performed by: INTERNAL MEDICINE

## 2020-03-11 PROCEDURE — 99395 PR PREVENTIVE VISIT,EST,18-39: ICD-10-PCS | Mod: S$GLB,,, | Performed by: INTERNAL MEDICINE

## 2020-03-11 PROCEDURE — 99395 PREV VISIT EST AGE 18-39: CPT | Mod: S$GLB,,, | Performed by: INTERNAL MEDICINE

## 2020-03-11 PROCEDURE — 36415 COLL VENOUS BLD VENIPUNCTURE: CPT

## 2020-03-11 RX ORDER — DESVENLAFAXINE 100 MG/1
TABLET, EXTENDED RELEASE ORAL
COMMUNITY
Start: 2020-01-31 | End: 2021-05-17 | Stop reason: SDUPTHER

## 2020-03-11 NOTE — PROGRESS NOTES
INTERNAL MEDICINE ANNUAL VISIT NOTE      CHIEF COMPLAINT     ANNUAL    HPI     Royal HALLEY Celaya III is a 20 y.o. C male who presents for annual.  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    Was at psychiatrist's office and had an elevated BP reading.     Autism spectrum d/o, HARITHA, ADHD.  pristiq 100mg daily, buspar 5mg BID, propranolol 40mg daily, ativan 0.5mg daily prn (hasn't taken this for a while).  Feels like this regimen is working well. Enjoying [place].     Past Medical History:  Past Medical History:   Diagnosis Date    ADHD     Autism     Constipation - functional     Multiple nevi     Social anxiety disorder        Past Surgical History:  Past Surgical History:   Procedure Laterality Date    TESTICLE SURGERY  2001    hydrocele       Allergies:  Review of patient's allergies indicates:   Allergen Reactions    Pcn [penicillins] Hives       Home Medications:  Prior to Admission medications    Medication Sig Start Date End Date Taking? Authorizing Provider   busPIRone (BUSPAR) 5 MG Tab Take 5 mg by mouth 2 (two) times daily.    Historical Provider, MD   LORazepam (ATIVAN) 0.5 MG tablet Take 1 tablet (0.5 mg total) by mouth every 4 (four) hours as needed for Anxiety. 8/21/18 5/23/19  Zoe Rocha MD   propranolol (INDERAL) 40 MG tablet Take 40 mg by mouth once daily.     Historical Provider, MD   FLUoxetine (PROZAC) 40 MG capsule Take 1 capsule (40 mg total) by mouth once daily. 8/21/18 3/11/20  Zoe Rocha MD       Family History:  Family History   Problem Relation Age of Onset    Nevi Father     Hyperlipidemia Father     Hypertension Father     Mental illness Father     Asthma Sister     Blindness Maternal Grandmother     Glaucoma Maternal Grandmother     Alzheimer's disease Maternal Grandmother     Cataracts Maternal Grandfather     Cancer Maternal Grandfather         SKIN CA    Heart disease Paternal Grandmother     Anxiety disorder Paternal  "Grandfather     Cancer Paternal Grandfather         prostate CA    Amblyopia Neg Hx     Diabetes Neg Hx     Retinal detachment Neg Hx     Strabismus Neg Hx     Cirrhosis Neg Hx        Social History:  Social History     Tobacco Use    Smoking status: Passive Smoke Exposure - Never Smoker    Smokeless tobacco: Never Used    Tobacco comment: Dad smokes a pipe   Substance Use Topics    Alcohol use: Not Currently     Frequency: Monthly or less     Drinks per session: 1 or 2     Binge frequency: Less than monthly    Drug use: No       Review of Systems:  Review of Systems   Constitutional: Positive for unexpected weight change. Negative for activity change.   HENT: Negative for hearing loss, rhinorrhea and trouble swallowing.    Eyes: Negative for discharge and visual disturbance.   Respiratory: Negative for chest tightness and wheezing.    Cardiovascular: Negative for chest pain and palpitations.   Gastrointestinal: Negative for blood in stool, constipation, diarrhea and vomiting.   Endocrine: Negative for polydipsia and polyuria.   Genitourinary: Negative for difficulty urinating, hematuria and urgency.   Musculoskeletal: Negative for arthralgias, joint swelling and neck pain.   Neurological: Negative for weakness and headaches.   Psychiatric/Behavioral: Negative for confusion and dysphoric mood.       Health Maintainence:    reviewed. UTD on vaccines just don't have on record. Pt is in college.     PHYSICAL EXAM     /82 (BP Location: Left arm, Patient Position: Sitting, BP Method: Large (Manual))   Pulse 87   Temp 98.3 °F (36.8 °C)   Ht 5' 10" (1.778 m)   Wt 126.1 kg (278 lb)   BMI 39.89 kg/m²     GEN - A+OX4, NAD   HEENT - PERRL, EOMI, OP clear. MMM.   Neck - No thyromegaly or cervical LAD. No thyroid masses felt.  CV - RRR, no m/r   Chest - CTAB, no wheezing or rhonchi  Abd - S/NT/ND/+BS.   Ext - 2+BDP and radial pulses. No LE edema.   Neuro - PERRL, EOMI, no nystagmus, eyebrow raise, facial " sensation, hearing, m of mastication, smile, palatal raise, shoulder shrug, tongue protrusion symmetric and intact. 5/5 BUE and BLE strength. Sensation to light touch intact throughout. 2+ DTRs. Normal gait.   MSK - No spinal tenderness to palpation. Normal gait.   Skin - No rash.    LABS     Previous labs reviewed.    ASSESSMENT/PLAN     Royal HALLEY Celaya III is a 20 y.o. male with   was seen today for annual exam.    Diagnoses and all orders for this visit:    Annual physical exam - labs reviewed.   -     CBC auto differential; Future; Expected date: 03/11/2020    HARITHA (generalized anxiety disorder) - controlled on current meds. F/u w/ psychiatrist.     NAFLD (nonalcoholic fatty liver disease) - has f/u appt w/ hepatology.     Adult BMI 39.89 kg/sq m - suggested small changes that may help. Diet and exercise and weight loss stressed patricia given fatty liver.     Other orders  -     desvenlafaxine succinate (PRISTIQ) 100 MG Tb24; TAKE 1 TABLET BY MOUTH EVERY MORNING**INS?    Had flu vaccine w/ school  RTC in 12 months, sooner if needed and depending on labs.    Maite Franks MD  Department of Internal Medicine - [facility]  6:57 AM

## 2020-03-13 ENCOUNTER — TELEPHONE (OUTPATIENT)
Dept: INTERNAL MEDICINE | Facility: CLINIC | Age: 20
End: 2020-03-13

## 2020-03-13 DIAGNOSIS — R79.89 ABNORMAL CBC: Primary | ICD-10-CM

## 2020-03-13 NOTE — TELEPHONE ENCOUNTER
Please call and notify pt:    Labs still w/ some immature white blood cells. Repeat in a mo (not sooner) or the next time he's back in town.

## 2020-03-16 NOTE — TELEPHONE ENCOUNTER
Spoke with mother swati. Informed pt's mother of results and for pt to repeat lab in a month no sooner or when pt is back  In town. Mother verbally understood.

## 2020-05-14 ENCOUNTER — LAB VISIT (OUTPATIENT)
Dept: PRIMARY CARE CLINIC | Facility: CLINIC | Age: 20
End: 2020-05-14
Payer: COMMERCIAL

## 2020-05-14 DIAGNOSIS — Z00.6 RESEARCH STUDY PATIENT: Primary | ICD-10-CM

## 2020-05-14 PROCEDURE — U0002 COVID-19 LAB TEST NON-CDC: HCPCS

## 2020-05-14 PROCEDURE — 86769 SARS-COV-2 COVID-19 ANTIBODY: CPT

## 2020-05-14 NOTE — RESEARCH
Date of Consent: [id number]  Sponsor: [facility]    Study Title/IRB Number: Observational study of Sars-CoV2 Immunoglobulin G (IgG) seroprevalence among the [facility] population over time 2020.163  Principle Investigator: Fabiola Champion, PhD    Did the patient need translation services? No   name: N/A    Prior to the Informed Consent (IC) being signed, or any study protocol required data collection, testing, procedure, or intervention being performed, the following was done and/or discussed:   Patient was given a paper copy of the IC for review    Patient was given study FAQ   Purpose of the study and qualifications to participate    Study design and tests or procedures done at this visit   Confidentiality and HIPAA Authorization for Release of Medical Records for the research trial/ subject's rights/research related injury   Risk, Benefits, Alternative Treatments, Compensation and Costs   Participation in the research trial is voluntary and patient may withdraw at anytime   Contact information for study related questions    Patient verbalizes understanding of the above: Yes  Contact information for PI and IRB given to patient: Yes  Patient able to adequately summarize: the purpose of the study, the risks associated with the study, and all procedures, testing, and follow-ups associated with the study: Yes    The consent was discussed verbally with the patient and all questions were answered satisfactorily. Patient gave verbal consent for the Seroprevalence research study with an IRB approval date of 05/08/2020.      The Consent, Consent Witness and name of Clinical Research Coordinator consenting was captured and documented in REDCap.    All Inclusion and Exclusion Criteria reviewed, subject meets all Inclusion criteria and does not meet any Exclusion Criteria at this time.     Patient Eligibility was confirmed.    Patient responded to survey questions.    The following biospecimen  collection procedures were collected:    -Nasopharyngeal Swab Collection  -Blood collection

## 2020-05-15 LAB — SARS-COV-2 IGG SERPLBLD QL IA.RAPID: NEGATIVE

## 2020-05-16 LAB — SARS-COV-2 RNA RESP QL NAA+PROBE: NOT DETECTED

## 2020-05-26 ENCOUNTER — PATIENT OUTREACH (OUTPATIENT)
Dept: ADMINISTRATIVE | Facility: OTHER | Age: 20
End: 2020-05-26

## 2020-05-27 ENCOUNTER — PATIENT MESSAGE (OUTPATIENT)
Dept: INTERNAL MEDICINE | Facility: CLINIC | Age: 20
End: 2020-05-27

## 2020-05-27 DIAGNOSIS — R79.89 ABNORMAL CBC: Primary | ICD-10-CM

## 2020-05-28 ENCOUNTER — OFFICE VISIT (OUTPATIENT)
Dept: HEPATOLOGY | Facility: CLINIC | Age: 20
End: 2020-05-28
Payer: COMMERCIAL

## 2020-05-28 ENCOUNTER — LAB VISIT (OUTPATIENT)
Dept: LAB | Facility: HOSPITAL | Age: 20
End: 2020-05-28
Attending: INTERNAL MEDICINE
Payer: COMMERCIAL

## 2020-05-28 VITALS
SYSTOLIC BLOOD PRESSURE: 122 MMHG | HEIGHT: 69 IN | BODY MASS INDEX: 40.81 KG/M2 | RESPIRATION RATE: 18 BRPM | HEART RATE: 71 BPM | DIASTOLIC BLOOD PRESSURE: 63 MMHG | WEIGHT: 275.56 LBS | OXYGEN SATURATION: 97 %

## 2020-05-28 DIAGNOSIS — E66.01 SEVERE OBESITY (BMI >= 40): ICD-10-CM

## 2020-05-28 DIAGNOSIS — R79.89 ABNORMAL CBC: ICD-10-CM

## 2020-05-28 DIAGNOSIS — E88.01 LOW PLASMA ALPHA-1 ANTITRYPSIN: ICD-10-CM

## 2020-05-28 DIAGNOSIS — K76.0 NAFLD (NONALCOHOLIC FATTY LIVER DISEASE): Primary | ICD-10-CM

## 2020-05-28 PROBLEM — R74.01 ELEVATED ALT MEASUREMENT: Status: RESOLVED | Noted: 2019-03-13 | Resolved: 2020-05-28

## 2020-05-28 LAB
BASOPHILS # BLD AUTO: 0.06 K/UL (ref 0–0.2)
BASOPHILS NFR BLD: 0.9 % (ref 0–1.9)
DIFFERENTIAL METHOD: ABNORMAL
EOSINOPHIL # BLD AUTO: 0.2 K/UL (ref 0–0.5)
EOSINOPHIL NFR BLD: 2.1 % (ref 0–8)
ERYTHROCYTE [DISTWIDTH] IN BLOOD BY AUTOMATED COUNT: 13.2 % (ref 11.5–14.5)
HCT VFR BLD AUTO: 40.9 % (ref 40–54)
HGB BLD-MCNC: 12.9 G/DL (ref 14–18)
IMM GRANULOCYTES # BLD AUTO: 0.02 K/UL (ref 0–0.04)
IMM GRANULOCYTES NFR BLD AUTO: 0.3 % (ref 0–0.5)
LYMPHOCYTES # BLD AUTO: 1.9 K/UL (ref 1–4.8)
LYMPHOCYTES NFR BLD: 26.5 % (ref 18–48)
MCH RBC QN AUTO: 27.6 PG (ref 27–31)
MCHC RBC AUTO-ENTMCNC: 31.5 G/DL (ref 32–36)
MCV RBC AUTO: 87 FL (ref 82–98)
MONOCYTES # BLD AUTO: 0.7 K/UL (ref 0.3–1)
MONOCYTES NFR BLD: 10 % (ref 4–15)
NEUTROPHILS # BLD AUTO: 4.2 K/UL (ref 1.8–7.7)
NEUTROPHILS NFR BLD: 60.2 % (ref 38–73)
NRBC BLD-RTO: 0 /100 WBC
PLATELET # BLD AUTO: 309 K/UL (ref 150–350)
PMV BLD AUTO: 10.2 FL (ref 9.2–12.9)
RBC # BLD AUTO: 4.68 M/UL (ref 4.6–6.2)
WBC # BLD AUTO: 6.99 K/UL (ref 3.9–12.7)

## 2020-05-28 PROCEDURE — 85025 COMPLETE CBC W/AUTO DIFF WBC: CPT

## 2020-05-28 PROCEDURE — 99999 PR PBB SHADOW E&M-EST. PATIENT-LVL IV: CPT | Mod: PBBFAC,,, | Performed by: NURSE PRACTITIONER

## 2020-05-28 PROCEDURE — 99213 PR OFFICE/OUTPT VISIT, EST, LEVL III, 20-29 MIN: ICD-10-PCS | Mod: S$GLB,,, | Performed by: NURSE PRACTITIONER

## 2020-05-28 PROCEDURE — 36415 COLL VENOUS BLD VENIPUNCTURE: CPT

## 2020-05-28 PROCEDURE — 99213 OFFICE O/P EST LOW 20 MIN: CPT | Mod: S$GLB,,, | Performed by: NURSE PRACTITIONER

## 2020-05-28 PROCEDURE — 99999 PR PBB SHADOW E&M-EST. PATIENT-LVL IV: ICD-10-PCS | Mod: PBBFAC,,, | Performed by: NURSE PRACTITIONER

## 2020-05-28 PROCEDURE — 3008F BODY MASS INDEX DOCD: CPT | Mod: CPTII,S$GLB,, | Performed by: NURSE PRACTITIONER

## 2020-05-28 PROCEDURE — 3008F PR BODY MASS INDEX (BMI) DOCUMENTED: ICD-10-PCS | Mod: CPTII,S$GLB,, | Performed by: NURSE PRACTITIONER

## 2020-05-28 NOTE — PATIENT INSTRUCTIONS
1. Follow-up in 1 year with repeat Fibroscan      Tips for low/moderate carbohydrate diet:  3 meals a day made up of the following:  -- Unlimited green vegetables, tomatoes, mushrooms, spaghetti squash, cauliflower, meat, poultry, seafood, eggs and hard cheeses.   -- Milk and plain yogurt  -- Dressings, seasonings, condiments, etc should have less than 2 g sugars.   -- Beans (1-1.5 cups) or nuts (1/4 cup): can have 1 x a day.   -- 1-2 servings of citrus fruits, berries, pineapple or melon a day (1/2 cup)  -- Avoid fried foods    *No grains, rice, pasta, potatoes, bread, corn, peas, oatmeal, grits, tortillas, crackers, chips    *No soda, sweet tea, juices or lemonade    Try www.dietdoctor.Qype for recipes (moderate carb intake)          Fatty liver    There is no FDA approved therapy for non-alcoholic fatty liver disease (NAFLD); therefore, lifestyle changes are important:  1. Weight loss goal of ~10 lbs  2. Low carb/sugar, high protein diet  3. Exercise, 5 days per week, 30 minutes per day, as tolerated  4. Recommend good control of cholesterol, blood pressure, blood sugar levels (as these are risk factors for fatty liver)     In some people, fatty liver can progress to steatohepatitis (inflamatory fatty liver) and possibly to cirrhosis, putting one at increased risk for liver cancer and liver failure. Lifestyle changes can help to decrease this risk.     Online resources:    Websites with information about fatty liver and inflammation related to fatty liver (ESTES): www.nashtruth.com and www.nashactually.com     Facebook support group with tips and recipes:   Non-Alcoholic Fatty Liver Disease (NAFLD) Diet & Nutrition Support     [facility]: Nonalcoholic Fatty Liver Disease

## 2020-05-28 NOTE — PROGRESS NOTES
[facility] - Established Patient    Last Clinic Visit: 11/25/19    CHIEF COMPLAINT: Follow-up for fatty liver     HPI: This is a 20 y.o. White male here for follow-up for fatty liver.    Initial referral for mildly elevated ALT, 50-60.  Serological workup has been negative for Santosh's, hemochromatosis, autoimmune etiology, and viral hepatitis. NANDO-D negative. A1AT phenotype MZ and level noted to be low, 72.     Fibroscan 5/16/19:   KPa = 5.4, F0-F1, no-mild fibrosis  CAP = 319, S3 or >67% steatosis    Updates on risk factors for fatty liver:     · Weight -- Body mass index is 40.7 kg/m². Has gained >40 lb over the last year.                     · Cholesterol -- well controlled   · Alcohol use -- none  · BP well controlled, no DM     His transaminases normalized in May despite weight gain.  His mother is with him today. Continues to acknowledge poor dietary choices. Still eating junk food and drinking sugary beverages. Tried topamax prescribed by his psychiatrist though he had side effects with this.     Living at home now; doing online classes for [employer] in [place].     Denies symptoms of hepatic decompensation including jaundice, dark urine, hematemesis, melena, slowed mentation, abdominal distention, or lower extremity edema.          Review of patient's allergies indicates:   Allergen Reactions    Pcn [penicillins] Hives        Current Outpatient Medications on File Prior to Visit   Medication Sig Dispense Refill    busPIRone (BUSPAR) 5 MG Tab Take 5 mg by mouth 2 (two) times daily.      desvenlafaxine succinate (PRISTIQ) 100 MG Tb24 TAKE 1 TABLET BY MOUTH EVERY MORNING**INS?      propranolol (INDERAL) 40 MG tablet Take 40 mg by mouth once daily.       LORazepam (ATIVAN) 0.5 MG tablet Take 1 tablet (0.5 mg total) by mouth every 4 (four) hours as needed for Anxiety. 60 tablet 1     No current facility-administered medications on file prior to visit.          PMHX:  has a past medical history  of ADHD, Autism, Constipation - functional, Multiple nevi, and Social anxiety disorder.    PSHX:  has a past surgical history that includes Testicle surgery (2001).    FAMILY HISTORY:   Family History   Problem Relation Age of Onset    Nevi Father     Hyperlipidemia Father     Hypertension Father     Mental illness Father     Asthma Sister     Blindness Maternal Grandmother     Glaucoma Maternal Grandmother     Alzheimer's disease Maternal Grandmother     Cataracts Maternal Grandfather     Cancer Maternal Grandfather         SKIN CA    Heart disease Paternal Grandmother     Anxiety disorder Paternal Grandfather     Cancer Paternal Grandfather         prostate CA    Amblyopia Neg Hx     Diabetes Neg Hx     Retinal detachment Neg Hx     Strabismus Neg Hx     Cirrhosis Neg Hx        SOCIAL HISTORY:   Social History     Tobacco Use   Smoking Status Passive Smoke Exposure - Never Smoker   Smokeless Tobacco Never Used   Tobacco Comment    Dad smokes a pipe       Social History     Substance and Sexual Activity   Alcohol Use Not Currently    Frequency: Monthly or less    Drinks per session: 1 or 2    Binge frequency: Less than monthly       Social History     Substance and Sexual Activity   Drug Use No         Review of Systems   Constitutional: Negative for appetite change, chills, fatigue, fever. (+) weight gain   Eyes: Negative for visual disturbance.   Respiratory: Negative for cough and shortness of breath.    Cardiovascular: Negative for chest pain, palpitations and leg swelling.   Gastrointestinal: Negative for abdominal distention, abdominal pain, blood in stool, constipation, diarrhea, nausea and vomiting. No change in stool color.  Genitourinary: Negative for dysuria and hematuria. Denies dark urine.   Musculoskeletal: Negative for arthralgias, gait problem, joint swelling and myalgias.   Skin: Negative for color change, rash and wound. Denies itching.   Neurological: Negative for dizziness,  "tremors, speech difficulty, and headaches.   Hematological: Does not bruise/bleed easily.   Psychiatric/Behavioral: Negative for confusion, decreased concentration and sleep disturbance. Denies memory loss. Denies depression. The patient is not nervous/anxious.        Physical Exam   Constitutional: Well-nourished. No distress. Alert and oriented to person, place, and time.  Eyes: No scleral icterus.   Cardiovascular: Normal rate, regular rhythm and normal heart sounds. No murmur heard.  Pulmonary/Chest: Respiratory effort and breath sounds normal. No respiratory distress.   Abdominal: Soft, non-tender. No distension; no ascites appreciated. There is no palpable hepatosplenomegaly. No hernia or mass.   Musculoskeletal: No edema.   Neurological: No tremor. Coordination and gait normal. No asterixis.    Skin: Skin is warm and dry. No rash or erythema. No jaundice. No telangiectasias or palmar erythema noted.  Psychiatric: Speech, behavior, and thought content normal. No depression or anxiety noted. Withdrawn affect.        /63 (BP Location: Right arm, Patient Position: Sitting, BP Method: Medium (Automatic))   Pulse 71   Resp 18   Ht 5' 9" (1.753 m)   Wt 125 kg (275 lb 9.2 oz)   SpO2 97%   BMI 40.70 kg/m²       LABS:  Lab Results   Component Value Date    WBC 8.90 03/11/2020    HGB 13.5 (L) 03/11/2020    HCT 42.4 03/11/2020     03/11/2020     03/05/2020    K 4.1 03/05/2020    CREATININE 0.8 03/05/2020    ALT 39 03/05/2020    AST 26 03/05/2020    ALKPHOS 99 03/05/2020    BILITOT 0.3 03/05/2020    BILIDIR <0.1 (A) 11/25/2019    ALBUMIN 4.0 03/05/2020    SMOOTHMUSCAB Negative 1:40 05/16/2019    AMAIFA Negative 1:40 05/16/2019    IGGSERUM 938 05/16/2019    ANASCREEN Negative <1:160 05/16/2019    FERRITIN 123 05/16/2019    FESATURATED 19 (L) 05/16/2019    LMJPM5EIIJRU MZ 05/16/2019    YNAWZ1ROMBQM 72 (L) 05/16/2019    CERULOPLSM 30.0 05/16/2019    CHOL 173 03/05/2020    TRIG 73 03/05/2020    " LDLCALC 96.4 03/05/2020    HGBA1C 5.4 03/05/2020       No results found for: HEPAIGG    Hepatitis B Surface Ag   Date Value Ref Range Status   05/16/2019 Negative  Final     No results found for: HEPBCAB  No results found for: HEPBSAB  Hepatitis C Ab   Date Value Ref Range Status   05/16/2019 Negative  Final     Immunization History   Administered Date(s) Administered    DTaP 1998, 1998, 1998, 05/10/1999, 10/08/2002    HIB 1998, 1998, 1998, 05/10/1999    HPV Quadrivalent 12/20/2012, 02/27/2013, 06/20/2013    Hepatitis A, Pediatric/Adolescent, 2 Dose 02/19/2015    Hepatitis B, Pediatric/Adolescent 1998, 1998, 1998    IPV 1998, 1998, 02/01/1999, 02/19/2002    Influenza 12/15/2008, 10/15/2010, 11/07/2011, 12/20/2012, 03/06/2014    Influenza - Quadrivalent - Intranasal (2-49 years old) 02/19/2015, 02/22/2016    Influenza - Trivalent (ADULT) 12/15/2008, 10/15/2010, 11/07/2011, 12/20/2012    Influenza - Trivalent - PF (ADULT) 03/06/2014    Influenza Split 12/20/2012    MMR 02/01/1999, 02/19/2002    Meningococcal Conjugate 03/06/2014    Meningococcal Conjugate (MCV4P) 08/27/2009, 03/06/2014    Tdap 08/27/2009    Varicella 02/01/1999, 08/27/2009          DIAGNOSTIC STUDIES:  ABD. US  Results for orders placed during the hospital encounter of 03/13/19   US Abdomen Complete    Narrative EXAMINATION:  US ABDOMEN COMPLETE    CLINICAL HISTORY:  Nonspecific elevation of levels of transaminase and lactic acid dehydrogenase (ldh)    TECHNIQUE:  Complete abdominal ultrasound (including pancreas, aorta, liver, gallbladder, common bile duct, IVC, kidneys, and spleen) was performed.    COMPARISON:  None    FINDINGS:  Pancreas: The visualized portions of pancreas appear normal.    Aorta: No aneurysm.    Liver: 17.5 cm, enlarged in size. Homogeneous parenchymal echotexture. No focal lesions.    Gallbladder: No calculi, wall thickening, or pericholecystic  fluid.  Negative sonographic Alex's sign.    Biliary system: 3 mm common bile duct.  No intrahepatic ductal dilatation.    Inferior vena cava: Normal in appearance.    Right kidney: 10.4 cm. No hydronephrosis.    Left kidney: 11.5 cm. No hydronephrosis.    Spleen: 11.9 x 4.7 cm.  Normal in size with homogeneous echotexture.    Miscellaneous: No ascites.      Impression Hepatomegaly.    Electronically signed by resident: Lacy Linton  Date:    03/13/2019  Time:    16:07    Electronically signed by: Gage Salgado MD  Date:    03/13/2019  Time:    16:40         ASSESSMENT / PLAN:  20 y.o. White male with:    1. NAFLD - Fibroscan previously F0-F1  -- Labs reviewed, liver enzymes remain normal   -- Discussed importance of lifestyle modifications including healthy diet and adequate physical activity to achieve and maintain ideal body weight.   -- Low carbohydrate, low sugar diet.  -- Maintain control of blood pressure, cholesterol, and blood sugar as these are risk factors for fatty liver  -- Repeat Fibroscan at next visit.     *If statins are being considered for HLD, statins are safe in patients with liver disease. Statins can be used to treat dyslipidemia in patients with both NAFLD and ESTES.    2. Body mass index is 40.7 kg/m².  -- We discussed the need for dietary changes in detail. Recommend he start by cutting all sugary beverages first. Reviewed tips when ordering fast food / take-out.   -- Continue psychiatry f/u as food appears to be source of comfort and highly linked to depression  -- Offered resources including referral to bariatric medicine, dietician, medical fitness program. He will let us know if he is interested other options for medical weight loss. Referral placed to dietician.       3. A1AT phenotype MZ, low A1AT level       Monitor liver tests 1-2 x year.  F/u in 1 year with Fibroscan prior.      Orders Placed This Encounter   Procedures    FibroScan (Vibration Controlled Transient Elastography)     Ambulatory referral/consult to Nutrition Services           EDUCATION / DISCUSSION:   We discussed the manifestations of fatty liver. At this time there is no FDA approved therapy for fatty liver. The patient and I discussed the importance of lifestyle modifications such as diet, exercise, and weight loss for management of fatty liver. We reviewed modification of risk factors such as hypertension, hyperlipidemia, and diabetes mellitus / impaired fasting glucose. Discussed that excessive alcohol consumption can also cause fatty liver. We discussed a low carb, low sugar diet and a goal of 30 minutes of exercise 5 times per week. Patient is aware that fatty liver can progress to steatohepatitis and possibly to cirrhosis, putting one at increased risk for liver cancer or liver failure.         Thank you for allowing me to participate in the care of CLAUDIA James III-REBECCA  Hepatology and Liver Transplant

## 2020-05-29 ENCOUNTER — PATIENT MESSAGE (OUTPATIENT)
Dept: HEPATOLOGY | Facility: CLINIC | Age: 20
End: 2020-05-29

## 2020-06-02 ENCOUNTER — LAB VISIT (OUTPATIENT)
Dept: PRIMARY CARE CLINIC | Facility: CLINIC | Age: 20
End: 2020-06-02
Payer: COMMERCIAL

## 2020-06-02 DIAGNOSIS — Z11.59 ENCOUNTER FOR SCREENING FOR OTHER VIRAL DISEASES: Primary | ICD-10-CM

## 2020-06-02 PROCEDURE — U0003 INFECTIOUS AGENT DETECTION BY NUCLEIC ACID (DNA OR RNA); SEVERE ACUTE RESPIRATORY SYNDROME CORONAVIRUS 2 (SARS-COV-2) (CORONAVIRUS DISEASE [COVID-19]), AMPLIFIED PROBE TECHNIQUE, MAKING USE OF HIGH THROUGHPUT TECHNOLOGIES AS DESCRIBED BY CMS-2020-01-R: HCPCS

## 2020-06-04 LAB — SARS-COV-2 RNA RESP QL NAA+PROBE: NOT DETECTED

## 2021-03-18 ENCOUNTER — IMMUNIZATION (OUTPATIENT)
Dept: INTERNAL MEDICINE | Facility: CLINIC | Age: 21
End: 2021-03-18
Payer: COMMERCIAL

## 2021-03-18 DIAGNOSIS — Z23 NEED FOR VACCINATION: Primary | ICD-10-CM

## 2021-03-18 PROCEDURE — 0001A COVID-19, MRNA, LNP-S, PF, 30 MCG/0.3 ML DOSE VACCINE: ICD-10-PCS | Mod: CV19,,, | Performed by: INTERNAL MEDICINE

## 2021-03-18 PROCEDURE — 0001A COVID-19, MRNA, LNP-S, PF, 30 MCG/0.3 ML DOSE VACCINE: CPT | Mod: CV19,,, | Performed by: INTERNAL MEDICINE

## 2021-03-18 PROCEDURE — 91300 COVID-19, MRNA, LNP-S, PF, 30 MCG/0.3 ML DOSE VACCINE: CPT | Mod: ,,, | Performed by: INTERNAL MEDICINE

## 2021-03-18 PROCEDURE — 91300 COVID-19, MRNA, LNP-S, PF, 30 MCG/0.3 ML DOSE VACCINE: ICD-10-PCS | Mod: ,,, | Performed by: INTERNAL MEDICINE

## 2021-04-08 ENCOUNTER — IMMUNIZATION (OUTPATIENT)
Dept: INTERNAL MEDICINE | Facility: CLINIC | Age: 21
End: 2021-04-08
Payer: COMMERCIAL

## 2021-04-08 DIAGNOSIS — Z23 NEED FOR VACCINATION: Primary | ICD-10-CM

## 2021-04-08 PROCEDURE — 0002A COVID-19, MRNA, LNP-S, PF, 30 MCG/0.3 ML DOSE VACCINE: CPT | Mod: PBBFAC | Performed by: INTERNAL MEDICINE

## 2021-04-08 PROCEDURE — 91300 COVID-19, MRNA, LNP-S, PF, 30 MCG/0.3 ML DOSE VACCINE: CPT | Mod: PBBFAC | Performed by: INTERNAL MEDICINE

## 2021-04-15 ENCOUNTER — OFFICE VISIT (OUTPATIENT)
Dept: OPTOMETRY | Facility: CLINIC | Age: 21
End: 2021-04-15
Payer: COMMERCIAL

## 2021-04-15 DIAGNOSIS — H52.13 MYOPIA OF BOTH EYES: ICD-10-CM

## 2021-04-15 DIAGNOSIS — H53.9 VISION DISTURBANCE: Primary | ICD-10-CM

## 2021-04-15 PROCEDURE — 92014 COMPRE OPH EXAM EST PT 1/>: CPT | Mod: S$GLB,,, | Performed by: OPTOMETRIST

## 2021-04-15 PROCEDURE — 1126F PR PAIN SEVERITY QUANTIFIED, NO PAIN PRESENT: ICD-10-PCS | Mod: S$GLB,,, | Performed by: OPTOMETRIST

## 2021-04-15 PROCEDURE — 92014 PR EYE EXAM, EST PATIENT,COMPREHESV: ICD-10-PCS | Mod: S$GLB,,, | Performed by: OPTOMETRIST

## 2021-04-15 PROCEDURE — 92015 PR REFRACTION: ICD-10-PCS | Mod: S$GLB,,, | Performed by: OPTOMETRIST

## 2021-04-15 PROCEDURE — 99999 PR PBB SHADOW E&M-EST. PATIENT-LVL II: ICD-10-PCS | Mod: PBBFAC,,, | Performed by: OPTOMETRIST

## 2021-04-15 PROCEDURE — 99999 PR PBB SHADOW E&M-EST. PATIENT-LVL II: CPT | Mod: PBBFAC,,, | Performed by: OPTOMETRIST

## 2021-04-15 PROCEDURE — 1126F AMNT PAIN NOTED NONE PRSNT: CPT | Mod: S$GLB,,, | Performed by: OPTOMETRIST

## 2021-04-15 PROCEDURE — 92015 DETERMINE REFRACTIVE STATE: CPT | Mod: S$GLB,,, | Performed by: OPTOMETRIST

## 2021-04-27 ENCOUNTER — OFFICE VISIT (OUTPATIENT)
Dept: INTERNAL MEDICINE | Facility: CLINIC | Age: 21
End: 2021-04-27
Payer: COMMERCIAL

## 2021-04-27 ENCOUNTER — LAB VISIT (OUTPATIENT)
Dept: LAB | Facility: HOSPITAL | Age: 21
End: 2021-04-27
Attending: INTERNAL MEDICINE
Payer: COMMERCIAL

## 2021-04-27 ENCOUNTER — PATIENT MESSAGE (OUTPATIENT)
Dept: INTERNAL MEDICINE | Facility: CLINIC | Age: 21
End: 2021-04-27

## 2021-04-27 VITALS
OXYGEN SATURATION: 99 % | DIASTOLIC BLOOD PRESSURE: 74 MMHG | HEART RATE: 73 BPM | SYSTOLIC BLOOD PRESSURE: 116 MMHG | HEIGHT: 70 IN | BODY MASS INDEX: 37.24 KG/M2 | WEIGHT: 260.13 LBS

## 2021-04-27 DIAGNOSIS — D64.9 NORMOCYTIC ANEMIA: ICD-10-CM

## 2021-04-27 DIAGNOSIS — F90.9 ATTENTION DEFICIT HYPERACTIVITY DISORDER (ADHD), UNSPECIFIED ADHD TYPE: ICD-10-CM

## 2021-04-27 DIAGNOSIS — Z00.00 ANNUAL PHYSICAL EXAM: ICD-10-CM

## 2021-04-27 DIAGNOSIS — F41.1 GAD (GENERALIZED ANXIETY DISORDER): ICD-10-CM

## 2021-04-27 DIAGNOSIS — Z23 NEED FOR TETANUS, DIPHTHERIA, AND ACELLULAR PERTUSSIS (TDAP) VACCINE: ICD-10-CM

## 2021-04-27 DIAGNOSIS — Z00.00 ANNUAL PHYSICAL EXAM: Primary | ICD-10-CM

## 2021-04-27 LAB
ALBUMIN SERPL BCP-MCNC: 3.9 G/DL (ref 3.5–5.2)
ALP SERPL-CCNC: 80 U/L (ref 55–135)
ALT SERPL W/O P-5'-P-CCNC: 39 U/L (ref 10–44)
ANION GAP SERPL CALC-SCNC: 6 MMOL/L (ref 8–16)
AST SERPL-CCNC: 25 U/L (ref 10–40)
BASOPHILS # BLD AUTO: 0.06 K/UL (ref 0–0.2)
BASOPHILS NFR BLD: 0.8 % (ref 0–1.9)
BILIRUB SERPL-MCNC: 0.2 MG/DL (ref 0.1–1)
BUN SERPL-MCNC: 15 MG/DL (ref 6–20)
CALCIUM SERPL-MCNC: 9.1 MG/DL (ref 8.7–10.5)
CHLORIDE SERPL-SCNC: 109 MMOL/L (ref 95–110)
CHOLEST SERPL-MCNC: 166 MG/DL (ref 120–199)
CHOLEST/HDLC SERPL: 3 {RATIO} (ref 2–5)
CO2 SERPL-SCNC: 24 MMOL/L (ref 23–29)
CREAT SERPL-MCNC: 0.8 MG/DL (ref 0.5–1.4)
DIFFERENTIAL METHOD: ABNORMAL
EOSINOPHIL # BLD AUTO: 0.2 K/UL (ref 0–0.5)
EOSINOPHIL NFR BLD: 2.2 % (ref 0–8)
ERYTHROCYTE [DISTWIDTH] IN BLOOD BY AUTOMATED COUNT: 13.9 % (ref 11.5–14.5)
EST. GFR  (AFRICAN AMERICAN): >60 ML/MIN/1.73 M^2
EST. GFR  (NON AFRICAN AMERICAN): >60 ML/MIN/1.73 M^2
ESTIMATED AVG GLUCOSE: 103 MG/DL (ref 68–131)
FERRITIN SERPL-MCNC: 58 NG/ML (ref 20–300)
FOLATE SERPL-MCNC: 8.3 NG/ML (ref 4–24)
GLUCOSE SERPL-MCNC: 96 MG/DL (ref 70–110)
HBA1C MFR BLD: 5.2 % (ref 4–5.6)
HCT VFR BLD AUTO: 39.8 % (ref 40–54)
HDLC SERPL-MCNC: 56 MG/DL (ref 40–75)
HDLC SERPL: 33.7 % (ref 20–50)
HGB BLD-MCNC: 12.9 G/DL (ref 14–18)
IMM GRANULOCYTES # BLD AUTO: 0.02 K/UL (ref 0–0.04)
IMM GRANULOCYTES NFR BLD AUTO: 0.3 % (ref 0–0.5)
IRON SERPL-MCNC: 47 UG/DL (ref 45–160)
LDLC SERPL CALC-MCNC: 94 MG/DL (ref 63–159)
LYMPHOCYTES # BLD AUTO: 2.1 K/UL (ref 1–4.8)
LYMPHOCYTES NFR BLD: 28.3 % (ref 18–48)
MCH RBC QN AUTO: 27.6 PG (ref 27–31)
MCHC RBC AUTO-ENTMCNC: 32.4 G/DL (ref 32–36)
MCV RBC AUTO: 85 FL (ref 82–98)
MONOCYTES # BLD AUTO: 0.8 K/UL (ref 0.3–1)
MONOCYTES NFR BLD: 10.5 % (ref 4–15)
NEUTROPHILS # BLD AUTO: 4.2 K/UL (ref 1.8–7.7)
NEUTROPHILS NFR BLD: 57.9 % (ref 38–73)
NONHDLC SERPL-MCNC: 110 MG/DL
NRBC BLD-RTO: 0 /100 WBC
PLATELET # BLD AUTO: 286 K/UL (ref 150–450)
PMV BLD AUTO: 10.1 FL (ref 9.2–12.9)
POTASSIUM SERPL-SCNC: 4.5 MMOL/L (ref 3.5–5.1)
PROT SERPL-MCNC: 7.2 G/DL (ref 6–8.4)
RBC # BLD AUTO: 4.68 M/UL (ref 4.6–6.2)
SATURATED IRON: 12 % (ref 20–50)
SODIUM SERPL-SCNC: 139 MMOL/L (ref 136–145)
TOTAL IRON BINDING CAPACITY: 407 UG/DL (ref 250–450)
TRANSFERRIN SERPL-MCNC: 275 MG/DL (ref 200–375)
TRIGL SERPL-MCNC: 80 MG/DL (ref 30–150)
TSH SERPL DL<=0.005 MIU/L-ACNC: 0.92 UIU/ML (ref 0.4–4)
VIT B12 SERPL-MCNC: 417 PG/ML (ref 210–950)
WBC # BLD AUTO: 7.24 K/UL (ref 3.9–12.7)

## 2021-04-27 PROCEDURE — 86703 HIV-1/HIV-2 1 RESULT ANTBDY: CPT | Performed by: INTERNAL MEDICINE

## 2021-04-27 PROCEDURE — 3008F PR BODY MASS INDEX (BMI) DOCUMENTED: ICD-10-PCS | Mod: CPTII,S$GLB,, | Performed by: INTERNAL MEDICINE

## 2021-04-27 PROCEDURE — 3008F BODY MASS INDEX DOCD: CPT | Mod: CPTII,S$GLB,, | Performed by: INTERNAL MEDICINE

## 2021-04-27 PROCEDURE — 99395 PREV VISIT EST AGE 18-39: CPT | Mod: 25,S$GLB,, | Performed by: INTERNAL MEDICINE

## 2021-04-27 PROCEDURE — 80053 COMPREHEN METABOLIC PANEL: CPT | Performed by: INTERNAL MEDICINE

## 2021-04-27 PROCEDURE — 90715 TDAP VACCINE 7 YRS/> IM: CPT | Mod: S$GLB,,, | Performed by: INTERNAL MEDICINE

## 2021-04-27 PROCEDURE — 82728 ASSAY OF FERRITIN: CPT | Performed by: INTERNAL MEDICINE

## 2021-04-27 PROCEDURE — 83540 ASSAY OF IRON: CPT | Performed by: INTERNAL MEDICINE

## 2021-04-27 PROCEDURE — 85025 COMPLETE CBC W/AUTO DIFF WBC: CPT | Performed by: INTERNAL MEDICINE

## 2021-04-27 PROCEDURE — 36415 COLL VENOUS BLD VENIPUNCTURE: CPT | Performed by: INTERNAL MEDICINE

## 2021-04-27 PROCEDURE — 82746 ASSAY OF FOLIC ACID SERUM: CPT | Performed by: INTERNAL MEDICINE

## 2021-04-27 PROCEDURE — 1126F AMNT PAIN NOTED NONE PRSNT: CPT | Mod: S$GLB,,, | Performed by: INTERNAL MEDICINE

## 2021-04-27 PROCEDURE — 90471 IMMUNIZATION ADMIN: CPT | Mod: S$GLB,,, | Performed by: INTERNAL MEDICINE

## 2021-04-27 PROCEDURE — 99999 PR PBB SHADOW E&M-EST. PATIENT-LVL III: ICD-10-PCS | Mod: PBBFAC,,, | Performed by: INTERNAL MEDICINE

## 2021-04-27 PROCEDURE — 90471 TDAP VACCINE GREATER THAN OR EQUAL TO 7YO IM: ICD-10-PCS | Mod: S$GLB,,, | Performed by: INTERNAL MEDICINE

## 2021-04-27 PROCEDURE — 90715 TDAP VACCINE GREATER THAN OR EQUAL TO 7YO IM: ICD-10-PCS | Mod: S$GLB,,, | Performed by: INTERNAL MEDICINE

## 2021-04-27 PROCEDURE — 99999 PR PBB SHADOW E&M-EST. PATIENT-LVL III: CPT | Mod: PBBFAC,,, | Performed by: INTERNAL MEDICINE

## 2021-04-27 PROCEDURE — 84443 ASSAY THYROID STIM HORMONE: CPT | Performed by: INTERNAL MEDICINE

## 2021-04-27 PROCEDURE — 1126F PR PAIN SEVERITY QUANTIFIED, NO PAIN PRESENT: ICD-10-PCS | Mod: S$GLB,,, | Performed by: INTERNAL MEDICINE

## 2021-04-27 PROCEDURE — 80061 LIPID PANEL: CPT | Performed by: INTERNAL MEDICINE

## 2021-04-27 PROCEDURE — 83036 HEMOGLOBIN GLYCOSYLATED A1C: CPT | Performed by: INTERNAL MEDICINE

## 2021-04-27 PROCEDURE — 82607 VITAMIN B-12: CPT | Performed by: INTERNAL MEDICINE

## 2021-04-27 PROCEDURE — 99395 PR PREVENTIVE VISIT,EST,18-39: ICD-10-PCS | Mod: 25,S$GLB,, | Performed by: INTERNAL MEDICINE

## 2021-04-28 LAB — HIV 1+2 AB+HIV1 P24 AG SERPL QL IA: NEGATIVE

## 2021-05-03 ENCOUNTER — PATIENT MESSAGE (OUTPATIENT)
Dept: INTERNAL MEDICINE | Facility: CLINIC | Age: 21
End: 2021-05-03

## 2021-05-05 ENCOUNTER — PATIENT MESSAGE (OUTPATIENT)
Dept: INTERNAL MEDICINE | Facility: CLINIC | Age: 21
End: 2021-05-05

## 2021-05-05 DIAGNOSIS — Z23 NEED FOR MENINGITIS VACCINATION: Primary | ICD-10-CM

## 2021-05-06 ENCOUNTER — PATIENT MESSAGE (OUTPATIENT)
Dept: INTERNAL MEDICINE | Facility: CLINIC | Age: 21
End: 2021-05-06

## 2021-05-17 ENCOUNTER — OFFICE VISIT (OUTPATIENT)
Dept: PSYCHIATRY | Facility: CLINIC | Age: 21
End: 2021-05-17
Payer: COMMERCIAL

## 2021-05-17 DIAGNOSIS — F84.0 AUTISM SPECTRUM DISORDER: ICD-10-CM

## 2021-05-17 DIAGNOSIS — F90.2 ATTENTION DEFICIT HYPERACTIVITY DISORDER (ADHD), COMBINED TYPE: ICD-10-CM

## 2021-05-17 DIAGNOSIS — F41.1 GAD (GENERALIZED ANXIETY DISORDER): Primary | ICD-10-CM

## 2021-05-17 DIAGNOSIS — F41.0 PANIC ATTACKS: ICD-10-CM

## 2021-05-17 PROCEDURE — 90792 PSYCH DIAG EVAL W/MED SRVCS: CPT | Mod: 95,,, | Performed by: INTERNAL MEDICINE

## 2021-05-17 PROCEDURE — 90792 PR PSYCHIATRIC DIAGNOSTIC EVALUATION W/MEDICAL SERVICES: ICD-10-PCS | Mod: 95,,, | Performed by: INTERNAL MEDICINE

## 2021-05-17 RX ORDER — DESVENLAFAXINE 100 MG/1
100 TABLET, EXTENDED RELEASE ORAL DAILY
Qty: 30 TABLET | Refills: 11 | Status: SHIPPED | OUTPATIENT
Start: 2021-05-17 | End: 2022-05-30 | Stop reason: SDUPTHER

## 2021-05-17 RX ORDER — PROPRANOLOL HYDROCHLORIDE 40 MG/1
40 TABLET ORAL DAILY
Qty: 30 TABLET | Refills: 11 | Status: SHIPPED | OUTPATIENT
Start: 2021-05-17 | End: 2022-05-10

## 2021-05-17 RX ORDER — BUSPIRONE HYDROCHLORIDE 5 MG/1
5 TABLET ORAL 2 TIMES DAILY
Qty: 60 TABLET | Refills: 11 | Status: SHIPPED | OUTPATIENT
Start: 2021-05-17 | End: 2022-05-10

## 2021-06-08 ENCOUNTER — PATIENT MESSAGE (OUTPATIENT)
Dept: INTERNAL MEDICINE | Facility: CLINIC | Age: 21
End: 2021-06-08

## 2021-06-09 ENCOUNTER — PATIENT MESSAGE (OUTPATIENT)
Dept: INTERNAL MEDICINE | Facility: CLINIC | Age: 21
End: 2021-06-09

## 2021-08-06 ENCOUNTER — TELEPHONE (OUTPATIENT)
Dept: HEPATOLOGY | Facility: CLINIC | Age: 21
End: 2021-08-06

## 2021-08-09 ENCOUNTER — OFFICE VISIT (OUTPATIENT)
Dept: HEPATOLOGY | Facility: CLINIC | Age: 21
End: 2021-08-09
Payer: COMMERCIAL

## 2021-08-09 ENCOUNTER — PATIENT OUTREACH (OUTPATIENT)
Dept: ADMINISTRATIVE | Facility: OTHER | Age: 21
End: 2021-08-09

## 2021-08-09 DIAGNOSIS — E88.01 LOW PLASMA ALPHA-1 ANTITRYPSIN: ICD-10-CM

## 2021-08-09 DIAGNOSIS — K76.0 NAFLD (NONALCOHOLIC FATTY LIVER DISEASE): Primary | ICD-10-CM

## 2021-08-09 PROCEDURE — 99214 OFFICE O/P EST MOD 30 MIN: CPT | Mod: 95,,, | Performed by: NURSE PRACTITIONER

## 2021-08-09 PROCEDURE — 3044F PR MOST RECENT HEMOGLOBIN A1C LEVEL <7.0%: ICD-10-PCS | Mod: CPTII,95,, | Performed by: NURSE PRACTITIONER

## 2021-08-09 PROCEDURE — 1159F MED LIST DOCD IN RCRD: CPT | Mod: CPTII,95,, | Performed by: NURSE PRACTITIONER

## 2021-08-09 PROCEDURE — 99214 PR OFFICE/OUTPT VISIT, EST, LEVL IV, 30-39 MIN: ICD-10-PCS | Mod: 95,,, | Performed by: NURSE PRACTITIONER

## 2021-08-09 PROCEDURE — 3044F HG A1C LEVEL LT 7.0%: CPT | Mod: CPTII,95,, | Performed by: NURSE PRACTITIONER

## 2021-08-09 PROCEDURE — 1160F RVW MEDS BY RX/DR IN RCRD: CPT | Mod: CPTII,95,, | Performed by: NURSE PRACTITIONER

## 2021-08-09 PROCEDURE — 1159F PR MEDICATION LIST DOCUMENTED IN MEDICAL RECORD: ICD-10-PCS | Mod: CPTII,95,, | Performed by: NURSE PRACTITIONER

## 2021-08-09 PROCEDURE — 1160F PR REVIEW ALL MEDS BY PRESCRIBER/CLIN PHARMACIST DOCUMENTED: ICD-10-PCS | Mod: CPTII,95,, | Performed by: NURSE PRACTITIONER

## 2021-08-10 PROBLEM — E66.01 SEVERE OBESITY (BMI >= 40): Status: RESOLVED | Noted: 2020-05-28 | Resolved: 2021-08-10

## 2021-08-16 ENCOUNTER — PROCEDURE VISIT (OUTPATIENT)
Dept: HEPATOLOGY | Facility: CLINIC | Age: 21
End: 2021-08-16
Payer: COMMERCIAL

## 2021-08-16 ENCOUNTER — HOSPITAL ENCOUNTER (OUTPATIENT)
Dept: RADIOLOGY | Facility: HOSPITAL | Age: 21
Discharge: HOME | End: 2021-08-16
Attending: NURSE PRACTITIONER
Payer: COMMERCIAL

## 2021-08-16 DIAGNOSIS — K76.0 NAFLD (NONALCOHOLIC FATTY LIVER DISEASE): ICD-10-CM

## 2021-08-16 PROCEDURE — 76705 ECHO EXAM OF ABDOMEN: CPT | Mod: TC

## 2021-08-16 PROCEDURE — 91200 FIBROSCAN (VIBRATION CONTROLLED TRANSIENT ELASTOGRAPHY): ICD-10-PCS | Mod: S$GLB,,, | Performed by: NURSE PRACTITIONER

## 2021-08-16 PROCEDURE — 91200 LIVER ELASTOGRAPHY: CPT | Mod: S$GLB,,, | Performed by: NURSE PRACTITIONER

## 2021-08-16 PROCEDURE — 76705 ECHO EXAM OF ABDOMEN: CPT | Mod: 26,,, | Performed by: RADIOLOGY

## 2021-08-16 PROCEDURE — 76705 US ABDOMEN LIMITED: ICD-10-PCS | Mod: 26,,, | Performed by: RADIOLOGY

## 2021-08-18 ENCOUNTER — PATIENT MESSAGE (OUTPATIENT)
Dept: HEPATOLOGY | Facility: CLINIC | Age: 21
End: 2021-08-18

## 2021-08-18 ENCOUNTER — TELEPHONE (OUTPATIENT)
Dept: HEPATOLOGY | Facility: CLINIC | Age: 21
End: 2021-08-18

## 2021-08-18 DIAGNOSIS — K76.0 NAFLD (NONALCOHOLIC FATTY LIVER DISEASE): Primary | ICD-10-CM

## 2021-08-18 DIAGNOSIS — K74.01 HEPATIC FIBROSIS, EARLY FIBROSIS: ICD-10-CM

## 2021-08-20 ENCOUNTER — PATIENT MESSAGE (OUTPATIENT)
Dept: HEPATOLOGY | Facility: CLINIC | Age: 21
End: 2021-08-20

## 2021-08-20 ENCOUNTER — TELEPHONE (OUTPATIENT)
Dept: HEPATOLOGY | Facility: CLINIC | Age: 21
End: 2021-08-20

## 2021-11-23 ENCOUNTER — IMMUNIZATION (OUTPATIENT)
Dept: INTERNAL MEDICINE | Facility: CLINIC | Age: 21
End: 2021-11-23
Payer: COMMERCIAL

## 2021-11-23 ENCOUNTER — OFFICE VISIT (OUTPATIENT)
Dept: PSYCHIATRY | Facility: CLINIC | Age: 21
End: 2021-11-23
Payer: COMMERCIAL

## 2021-11-23 DIAGNOSIS — Z23 NEED FOR VACCINATION: Primary | ICD-10-CM

## 2021-11-23 DIAGNOSIS — F84.0 AUTISM SPECTRUM DISORDER: ICD-10-CM

## 2021-11-23 DIAGNOSIS — F41.0 PANIC ATTACKS: ICD-10-CM

## 2021-11-23 DIAGNOSIS — F41.1 GAD (GENERALIZED ANXIETY DISORDER): Primary | ICD-10-CM

## 2021-11-23 DIAGNOSIS — F90.2 ATTENTION DEFICIT HYPERACTIVITY DISORDER (ADHD), COMBINED TYPE: ICD-10-CM

## 2021-11-23 PROCEDURE — 99214 OFFICE O/P EST MOD 30 MIN: CPT | Mod: 95,,, | Performed by: INTERNAL MEDICINE

## 2021-11-23 PROCEDURE — 0004A COVID-19, MRNA, LNP-S, PF, 30 MCG/0.3 ML DOSE VACCINE: CPT | Mod: CV19,PBBFAC | Performed by: INTERNAL MEDICINE

## 2021-11-23 PROCEDURE — 99214 PR OFFICE/OUTPT VISIT, EST, LEVL IV, 30-39 MIN: ICD-10-PCS | Mod: 95,,, | Performed by: INTERNAL MEDICINE

## 2021-12-20 ENCOUNTER — OFFICE VISIT (OUTPATIENT)
Dept: INTERNAL MEDICINE | Facility: CLINIC | Age: 21
End: 2021-12-20
Payer: COMMERCIAL

## 2021-12-20 VITALS
DIASTOLIC BLOOD PRESSURE: 88 MMHG | OXYGEN SATURATION: 98 % | BODY MASS INDEX: 37.4 KG/M2 | WEIGHT: 261.25 LBS | SYSTOLIC BLOOD PRESSURE: 120 MMHG | HEIGHT: 70 IN | HEART RATE: 87 BPM

## 2021-12-20 DIAGNOSIS — J06.9 VIRAL URI WITH COUGH: Primary | ICD-10-CM

## 2021-12-20 DIAGNOSIS — R07.9 CHEST PAIN, UNSPECIFIED TYPE: ICD-10-CM

## 2021-12-20 PROCEDURE — 99999 PR PBB SHADOW E&M-EST. PATIENT-LVL III: ICD-10-PCS | Mod: PBBFAC,,, | Performed by: STUDENT IN AN ORGANIZED HEALTH CARE EDUCATION/TRAINING PROGRAM

## 2021-12-20 PROCEDURE — 99213 OFFICE O/P EST LOW 20 MIN: CPT | Mod: S$GLB,,, | Performed by: STUDENT IN AN ORGANIZED HEALTH CARE EDUCATION/TRAINING PROGRAM

## 2021-12-20 PROCEDURE — 99999 PR PBB SHADOW E&M-EST. PATIENT-LVL III: CPT | Mod: PBBFAC,,, | Performed by: STUDENT IN AN ORGANIZED HEALTH CARE EDUCATION/TRAINING PROGRAM

## 2021-12-20 PROCEDURE — 99213 PR OFFICE/OUTPT VISIT, EST, LEVL III, 20-29 MIN: ICD-10-PCS | Mod: S$GLB,,, | Performed by: STUDENT IN AN ORGANIZED HEALTH CARE EDUCATION/TRAINING PROGRAM

## 2021-12-20 RX ORDER — GUAIFENESIN/DEXTROMETHORPHAN 100-10MG/5
5 SYRUP ORAL EVERY 4 HOURS PRN
Qty: 236 ML | Refills: 0 | Status: SHIPPED | OUTPATIENT
Start: 2021-12-20 | End: 2021-12-30

## 2022-05-30 ENCOUNTER — OFFICE VISIT (OUTPATIENT)
Dept: PSYCHIATRY | Facility: CLINIC | Age: 22
End: 2022-05-30
Payer: COMMERCIAL

## 2022-05-30 DIAGNOSIS — F41.1 GAD (GENERALIZED ANXIETY DISORDER): Primary | ICD-10-CM

## 2022-05-30 DIAGNOSIS — F90.2 ATTENTION DEFICIT HYPERACTIVITY DISORDER (ADHD), COMBINED TYPE: ICD-10-CM

## 2022-05-30 DIAGNOSIS — F41.0 PANIC ATTACKS: ICD-10-CM

## 2022-05-30 DIAGNOSIS — F84.0 AUTISM SPECTRUM DISORDER: ICD-10-CM

## 2022-05-30 PROCEDURE — 1159F MED LIST DOCD IN RCRD: CPT | Mod: CPTII,95,, | Performed by: INTERNAL MEDICINE

## 2022-05-30 PROCEDURE — 1160F PR REVIEW ALL MEDS BY PRESCRIBER/CLIN PHARMACIST DOCUMENTED: ICD-10-PCS | Mod: CPTII,95,, | Performed by: INTERNAL MEDICINE

## 2022-05-30 PROCEDURE — 1159F PR MEDICATION LIST DOCUMENTED IN MEDICAL RECORD: ICD-10-PCS | Mod: CPTII,95,, | Performed by: INTERNAL MEDICINE

## 2022-05-30 PROCEDURE — 1160F RVW MEDS BY RX/DR IN RCRD: CPT | Mod: CPTII,95,, | Performed by: INTERNAL MEDICINE

## 2022-05-30 PROCEDURE — 99214 PR OFFICE/OUTPT VISIT, EST, LEVL IV, 30-39 MIN: ICD-10-PCS | Mod: 95,,, | Performed by: INTERNAL MEDICINE

## 2022-05-30 PROCEDURE — 99214 OFFICE O/P EST MOD 30 MIN: CPT | Mod: 95,,, | Performed by: INTERNAL MEDICINE

## 2022-05-30 RX ORDER — DESVENLAFAXINE 100 MG/1
100 TABLET, EXTENDED RELEASE ORAL DAILY
Qty: 90 TABLET | Refills: 3 | Status: SHIPPED | OUTPATIENT
Start: 2022-05-30 | End: 2023-05-25 | Stop reason: SDUPTHER

## 2022-05-30 RX ORDER — PROPRANOLOL HYDROCHLORIDE 40 MG/1
40 TABLET ORAL 2 TIMES DAILY
Qty: 180 TABLET | Refills: 3 | Status: SHIPPED | OUTPATIENT
Start: 2022-05-30 | End: 2023-05-05

## 2022-05-30 RX ORDER — BUSPIRONE HYDROCHLORIDE 5 MG/1
5 TABLET ORAL 2 TIMES DAILY
Qty: 180 TABLET | Refills: 3 | Status: SHIPPED | OUTPATIENT
Start: 2022-05-30 | End: 2023-05-25 | Stop reason: SDUPTHER

## 2022-05-30 NOTE — PROGRESS NOTES
OUTPATIENT PSYCHIATRY RETURN VISIT    ENCOUNTER DATE:  6/8/2022  SITE:  [facility]  LENGTH OF SESSION:  15 minutes    The patient location is:  [place]  The chief complaint leading to consultation is:  Anxiety    Visit type:  audiovisual    Face to Face time with patient:  15 minutes  20 minutes of total time spent on the encounter, which includes face to face time and non-face to face time preparing to see the patient (eg, review of tests), Obtaining and/or reviewing separately obtained history, Documenting clinical information in the electronic or other health record, Independently interpreting results (not separately reported) and communicating results to the patient/family/caregiver, or Care coordination (not separately reported).     Each patient to whom he or she provides medical services by telemedicine is:  (1) informed of the relationship between the physician and patient and the respective role of any other health care provider with respect to management of the patient; and (2) notified that he or she may decline to receive medical services by telemedicine and may withdraw from such care at any time.    CHIEF COMPLAINT:  Anxiety      HISTORY OF PRESENTING ILLNESS:  Royal HALLEY Celaya III is a 22 y.o. male with history of Anxiety, ADHD, and Autism spectrum disorder who presents for follow up appointment.      Plan at last appointment on 11/23/2021:  · Symptoms stable on current medications.  Discussed recommendation to continue seeing therapist at school to discuss goals, including hygiene.   · Continue Pristiq 100mg daily, Propranolol 40mg daily, and Buspar 5mg daily.    · Discussed with patient informed consent, risks versus benefits, alternative treatments, side effect profile and the inherent unpredictability of individual responses to these treatments.  The patient expresses understanding of the above and displays the capacity to agree with this current plan.    History as  told by patient:  Finished this year of school.  Did well on last performance - 9/10.  Year finished well.   Wasn't going great at one point with keeping himself clean but he got better at this.  Was very anxious around end of the year with jury/performance.  But that has improved now that its over.  In the winter, does feel lonelier.  Expensive to see people in [place].  Doesn't believe not bathing has anything to do with mood - says he has periods he just doesn't think it about.  Has done a better job this year with staying clean - cleaning house, doing laundry, noticed he felt better overall when things were clean.  Talked to someone [place] some.  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  Sleeping well.  Next year is last of Masters.  Then plan will be auditions posted on the internet.  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      Medication side effects:  No  Medication compliance:  Yes    PSYCHIATRIC REVIEW OF SYSTEMS:  Trouble with sleep:  Denies  Appetite changes:  Denies  Weight changes:  Denies  Lack of energy:  Denies  Anhedonia:  Denies  Somatic symptoms:  Denies  Libido:  Not discussed  Anxiety/panic:  Sometimes  Guilty/hopeless:  Denies  Self-injurious behavior/risky behavior:  Denies  Any drugs:  Denies  Alcohol:  Occasional    MEDICAL REVIEW OF SYSTEMS:  Complete review of systems performed covering Constitutional, Musculoskeletal, Neurologic.  All systems negative except for that covered in HPI.    PAST PSYCHIATRIC, MEDICAL, AND SOCIAL HISTORY REVIEWED  The patient's past medical, family and social history have been reviewed and updated as appropriate within the electronic medical record - see encounter notes.    MEDICATIONS:    Current Outpatient Medications:     busPIRone (BUSPAR) 5 MG Tab, Take 1 tablet (5 mg total) by mouth 2 (two) times daily., Disp: 180 tablet, Rfl: 3    desvenlafaxine succinate (PRISTIQ) 100 MG Tb24, Take 1 tablet  "(100 mg total) by mouth once daily., Disp: 90 tablet, Rfl: 3    propranoloL (INDERAL) 40 MG tablet, Take 1 tablet (40 mg total) by mouth 2 (two) times daily., Disp: 180 tablet, Rfl: 3    ALLERGIES:  Review of patient's allergies indicates:   Allergen Reactions    Pcn [penicillins] Hives       PSYCHIATRIC EXAM:  There were no vitals filed for this visit.  Appearance:  Well groomed, appearing healthy and of stated age  Behavior:  Cooperative, pleasant, no psychomotor agitation or retardation  Speech:  Normal rate, rhythm, prosody, and volume  Mood:  "Good"  Affect:  Flat (chronic)  Thought Process:  Linear, logical, goal directed  Thought Content:  Negative for suicidal ideation, homicidal ideation, delusions or hallucinations.  Associations:  Intact  Memory:  Grossly Intact  Level of Consciousness/Orientation:  Grossly intact  Fund of Knowledge:  Good  Attention:  Good  Language:  Fluent, able to name abstract and concrete objects  Insight:  Fair  Judgment:  Fair  Psychomotor signs:  No abnormal movements of face  Gait:  Unable to assess via virtual visit    RELEVANT LABS/STUDIES:    Lab Results   Component Value Date    WBC 7.67 06/02/2022    HGB 13.1 (L) 06/02/2022    HCT 40.5 06/02/2022    MCV 86 06/02/2022     06/02/2022     BMP  Lab Results   Component Value Date     06/02/2022    K 4.4 06/02/2022     06/02/2022    CO2 23 06/02/2022    BUN 16 06/02/2022    CREATININE 0.8 06/02/2022    CALCIUM 9.4 06/02/2022    ANIONGAP 8 06/02/2022    ESTGFRAFRICA >60.0 06/02/2022    EGFRNONAA >60.0 06/02/2022     Lab Results   Component Value Date    ALT 28 06/02/2022    AST 22 06/02/2022    ALKPHOS 73 06/02/2022    BILITOT 0.3 06/02/2022     Lab Results   Component Value Date    TSH 0.805 06/02/2022     Lab Results   Component Value Date    HGBA1C 5.2 04/27/2021       IMPRESSION:    Royal HALLEY Celaya III is a 22 y.o. male with history of Anxiety, ADHD, and Autism spectrum disorder who presents for follow up " appointment.      Status/Progress:  Based on the examination today, the patient's problem(s) is/are adequately but not ideally controlled.  New problems have not been presented today.     Risk Parameters:  Patient reports no suicidal ideation  Patient reports no homicidal ideation  Patient reports no self-injurious behavior  Patient reports no violent behavior    DIAGNOSES:    ICD-10-CM ICD-9-CM   1. HARITHA (generalized anxiety disorder)  F41.1 300.02   2. Panic attacks  F41.0 300.01   3. Attention deficit hyperactivity disorder (ADHD), combined type  F90.2 314.01   4. Autism spectrum disorder  F84.0 299.00       PLAN:  · Will increase Buspar to 5mg BID and Propranolol to 40mg BID for anxious days.  When anxiety is low, can continue once daily if he wishes.    · Continue Pristiq 100mg daily.  · Discussed with patient informed consent, risks versus benefits, alternative treatments, side effect profile and the inherent unpredictability of individual responses to these treatments.  The patient expresses understanding of the above and displays the capacity to agree with this current plan.    RETURN TO CLINIC:  Follow up in about 6 months (around 11/30/2022).

## 2022-06-02 ENCOUNTER — LAB VISIT (OUTPATIENT)
Dept: LAB | Facility: HOSPITAL | Age: 22
End: 2022-06-02
Payer: COMMERCIAL

## 2022-06-02 ENCOUNTER — OFFICE VISIT (OUTPATIENT)
Dept: INTERNAL MEDICINE | Facility: CLINIC | Age: 22
End: 2022-06-02
Payer: COMMERCIAL

## 2022-06-02 VITALS
SYSTOLIC BLOOD PRESSURE: 120 MMHG | WEIGHT: 254.44 LBS | DIASTOLIC BLOOD PRESSURE: 80 MMHG | HEIGHT: 71 IN | HEART RATE: 73 BPM | BODY MASS INDEX: 35.62 KG/M2

## 2022-06-02 DIAGNOSIS — F41.1 GAD (GENERALIZED ANXIETY DISORDER): ICD-10-CM

## 2022-06-02 DIAGNOSIS — Z13.220 SCREENING CHOLESTEROL LEVEL: ICD-10-CM

## 2022-06-02 DIAGNOSIS — Z00.00 ENCOUNTER FOR HEALTH MAINTENANCE EXAMINATION: Primary | ICD-10-CM

## 2022-06-02 DIAGNOSIS — Z01.89 ROUTINE LAB DRAW: ICD-10-CM

## 2022-06-02 DIAGNOSIS — F41.0 PANIC ATTACKS: ICD-10-CM

## 2022-06-02 DIAGNOSIS — K76.0 NAFLD (NONALCOHOLIC FATTY LIVER DISEASE): ICD-10-CM

## 2022-06-02 DIAGNOSIS — Z76.89 ENCOUNTER TO ESTABLISH CARE WITH NEW DOCTOR: ICD-10-CM

## 2022-06-02 DIAGNOSIS — R16.0 HEPATOMEGALY: ICD-10-CM

## 2022-06-02 DIAGNOSIS — F90.2 ATTENTION DEFICIT HYPERACTIVITY DISORDER (ADHD), COMBINED TYPE: ICD-10-CM

## 2022-06-02 DIAGNOSIS — F84.0 AUTISM SPECTRUM DISORDER: ICD-10-CM

## 2022-06-02 DIAGNOSIS — E66.9 OBESITY (BMI 30-39.9): ICD-10-CM

## 2022-06-02 DIAGNOSIS — E88.01 LOW PLASMA ALPHA-1 ANTITRYPSIN: ICD-10-CM

## 2022-06-02 PROBLEM — H04.129 TEAR FILM INSUFFICIENCY: Status: ACTIVE | Noted: 2022-06-02

## 2022-06-02 PROBLEM — H52.13 MYOPIA OF BOTH EYES: Status: ACTIVE | Noted: 2022-06-02

## 2022-06-02 LAB
ALBUMIN SERPL BCP-MCNC: 4.2 G/DL (ref 3.5–5.2)
ALP SERPL-CCNC: 73 U/L (ref 55–135)
ALT SERPL W/O P-5'-P-CCNC: 28 U/L (ref 10–44)
ANION GAP SERPL CALC-SCNC: 8 MMOL/L (ref 8–16)
AST SERPL-CCNC: 22 U/L (ref 10–40)
BASOPHILS # BLD AUTO: 0.05 K/UL (ref 0–0.2)
BASOPHILS NFR BLD: 0.7 % (ref 0–1.9)
BILIRUB SERPL-MCNC: 0.3 MG/DL (ref 0.1–1)
BUN SERPL-MCNC: 16 MG/DL (ref 6–20)
CALCIUM SERPL-MCNC: 9.4 MG/DL (ref 8.7–10.5)
CHLORIDE SERPL-SCNC: 106 MMOL/L (ref 95–110)
CHOLEST SERPL-MCNC: 159 MG/DL (ref 120–199)
CHOLEST/HDLC SERPL: 2.9 {RATIO} (ref 2–5)
CO2 SERPL-SCNC: 23 MMOL/L (ref 23–29)
CREAT SERPL-MCNC: 0.8 MG/DL (ref 0.5–1.4)
DIFFERENTIAL METHOD: ABNORMAL
EOSINOPHIL # BLD AUTO: 0.2 K/UL (ref 0–0.5)
EOSINOPHIL NFR BLD: 2.2 % (ref 0–8)
ERYTHROCYTE [DISTWIDTH] IN BLOOD BY AUTOMATED COUNT: 13.3 % (ref 11.5–14.5)
EST. GFR  (AFRICAN AMERICAN): >60 ML/MIN/1.73 M^2
EST. GFR  (NON AFRICAN AMERICAN): >60 ML/MIN/1.73 M^2
GLUCOSE SERPL-MCNC: 84 MG/DL (ref 70–110)
HCT VFR BLD AUTO: 40.5 % (ref 40–54)
HDLC SERPL-MCNC: 54 MG/DL (ref 40–75)
HDLC SERPL: 34 % (ref 20–50)
HGB BLD-MCNC: 13.1 G/DL (ref 14–18)
IMM GRANULOCYTES # BLD AUTO: 0.04 K/UL (ref 0–0.04)
IMM GRANULOCYTES NFR BLD AUTO: 0.5 % (ref 0–0.5)
LDLC SERPL CALC-MCNC: 85.2 MG/DL (ref 63–159)
LYMPHOCYTES # BLD AUTO: 2 K/UL (ref 1–4.8)
LYMPHOCYTES NFR BLD: 26.5 % (ref 18–48)
MCH RBC QN AUTO: 27.9 PG (ref 27–31)
MCHC RBC AUTO-ENTMCNC: 32.3 G/DL (ref 32–36)
MCV RBC AUTO: 86 FL (ref 82–98)
MONOCYTES # BLD AUTO: 0.6 K/UL (ref 0.3–1)
MONOCYTES NFR BLD: 8.3 % (ref 4–15)
NEUTROPHILS # BLD AUTO: 4.7 K/UL (ref 1.8–7.7)
NEUTROPHILS NFR BLD: 61.8 % (ref 38–73)
NONHDLC SERPL-MCNC: 105 MG/DL
NRBC BLD-RTO: 0 /100 WBC
PLATELET # BLD AUTO: 315 K/UL (ref 150–450)
PMV BLD AUTO: 9.9 FL (ref 9.2–12.9)
POTASSIUM SERPL-SCNC: 4.4 MMOL/L (ref 3.5–5.1)
PROT SERPL-MCNC: 7.3 G/DL (ref 6–8.4)
RBC # BLD AUTO: 4.69 M/UL (ref 4.6–6.2)
SODIUM SERPL-SCNC: 137 MMOL/L (ref 136–145)
TRIGL SERPL-MCNC: 99 MG/DL (ref 30–150)
TSH SERPL DL<=0.005 MIU/L-ACNC: 0.81 UIU/ML (ref 0.4–4)
WBC # BLD AUTO: 7.67 K/UL (ref 3.9–12.7)

## 2022-06-02 PROCEDURE — 99395 PREV VISIT EST AGE 18-39: CPT | Mod: S$GLB,,, | Performed by: NURSE PRACTITIONER

## 2022-06-02 PROCEDURE — 80053 COMPREHEN METABOLIC PANEL: CPT | Performed by: NURSE PRACTITIONER

## 2022-06-02 PROCEDURE — 3008F BODY MASS INDEX DOCD: CPT | Mod: CPTII,S$GLB,, | Performed by: NURSE PRACTITIONER

## 2022-06-02 PROCEDURE — 1159F PR MEDICATION LIST DOCUMENTED IN MEDICAL RECORD: ICD-10-PCS | Mod: CPTII,S$GLB,, | Performed by: NURSE PRACTITIONER

## 2022-06-02 PROCEDURE — 85025 COMPLETE CBC W/AUTO DIFF WBC: CPT | Performed by: NURSE PRACTITIONER

## 2022-06-02 PROCEDURE — 1160F PR REVIEW ALL MEDS BY PRESCRIBER/CLIN PHARMACIST DOCUMENTED: ICD-10-PCS | Mod: CPTII,S$GLB,, | Performed by: NURSE PRACTITIONER

## 2022-06-02 PROCEDURE — 36415 COLL VENOUS BLD VENIPUNCTURE: CPT | Performed by: NURSE PRACTITIONER

## 2022-06-02 PROCEDURE — 99999 PR PBB SHADOW E&M-EST. PATIENT-LVL IV: CPT | Mod: PBBFAC,,, | Performed by: NURSE PRACTITIONER

## 2022-06-02 PROCEDURE — 99999 PR PBB SHADOW E&M-EST. PATIENT-LVL IV: ICD-10-PCS | Mod: PBBFAC,,, | Performed by: NURSE PRACTITIONER

## 2022-06-02 PROCEDURE — 80061 LIPID PANEL: CPT | Performed by: NURSE PRACTITIONER

## 2022-06-02 PROCEDURE — 1160F RVW MEDS BY RX/DR IN RCRD: CPT | Mod: CPTII,S$GLB,, | Performed by: NURSE PRACTITIONER

## 2022-06-02 PROCEDURE — 3074F SYST BP LT 130 MM HG: CPT | Mod: CPTII,S$GLB,, | Performed by: NURSE PRACTITIONER

## 2022-06-02 PROCEDURE — 3008F PR BODY MASS INDEX (BMI) DOCUMENTED: ICD-10-PCS | Mod: CPTII,S$GLB,, | Performed by: NURSE PRACTITIONER

## 2022-06-02 PROCEDURE — 99395 PR PREVENTIVE VISIT,EST,18-39: ICD-10-PCS | Mod: S$GLB,,, | Performed by: NURSE PRACTITIONER

## 2022-06-02 PROCEDURE — 3074F PR MOST RECENT SYSTOLIC BLOOD PRESSURE < 130 MM HG: ICD-10-PCS | Mod: CPTII,S$GLB,, | Performed by: NURSE PRACTITIONER

## 2022-06-02 PROCEDURE — 3079F DIAST BP 80-89 MM HG: CPT | Mod: CPTII,S$GLB,, | Performed by: NURSE PRACTITIONER

## 2022-06-02 PROCEDURE — 1159F MED LIST DOCD IN RCRD: CPT | Mod: CPTII,S$GLB,, | Performed by: NURSE PRACTITIONER

## 2022-06-02 PROCEDURE — 3079F PR MOST RECENT DIASTOLIC BLOOD PRESSURE 80-89 MM HG: ICD-10-PCS | Mod: CPTII,S$GLB,, | Performed by: NURSE PRACTITIONER

## 2022-06-02 PROCEDURE — 84443 ASSAY THYROID STIM HORMONE: CPT | Performed by: NURSE PRACTITIONER

## 2022-06-02 NOTE — PROGRESS NOTES
Subjective:       Patient ID: Royal HALLEY Celaya III is a 22 y.o. male.    Chief Complaint: Annual Exam    Pt of Dr. Denton, here for annual    Last annual was 4-27-21 with Dr. Franks    Will need a new PCP since Dr. Denton is leaving    Followed by Dr. Moseley, Psych, for psych meds    No complaints, here with his mom today, fasting today    Review of Systems   Constitutional: Negative for activity change, appetite change and unexpected weight change.   HENT: Negative for hearing loss and voice change.    Eyes: Negative for visual disturbance.   Respiratory: Negative for apnea, cough, chest tightness and shortness of breath.    Cardiovascular: Negative for chest pain, palpitations and leg swelling.   Gastrointestinal: Negative for abdominal distention, abdominal pain, blood in stool, constipation, diarrhea, nausea and vomiting.   Endocrine: Negative for cold intolerance, heat intolerance, polydipsia, polyphagia and polyuria.   Genitourinary: Negative for difficulty urinating, dysuria and penile pain.   Musculoskeletal: Negative for arthralgias and myalgias.   Integumentary:  Negative for color change, pallor, rash and wound.   Allergic/Immunologic: Negative for environmental allergies, food allergies and frequent infections.   Neurological: Negative for dizziness, syncope, weakness, light-headedness, numbness and headaches.   Hematological: Negative for adenopathy. Does not bruise/bleed easily.   Psychiatric/Behavioral: Negative for agitation, behavioral problems, sleep disturbance and suicidal ideas.        Review of patient's allergies indicates:   Allergen Reactions    Pcn [penicillins] Hives       Current Outpatient Medications:     busPIRone (BUSPAR) 5 MG Tab, Take 1 tablet (5 mg total) by mouth 2 (two) times daily., Disp: 180 tablet, Rfl: 3    desvenlafaxine succinate (PRISTIQ) 100 MG Tb24, Take 1 tablet (100 mg total) by mouth once daily., Disp: 90 tablet, Rfl: 3    propranoloL (INDERAL) 40 MG tablet, Take 1  tablet (40 mg total) by mouth 2 (two) times daily., Disp: 180 tablet, Rfl: 3    Patient Active Problem List   Diagnosis    Multiple nevi    Visual floaters, bilateral    HARITHA (generalized anxiety disorder)    Hepatomegaly    NAFLD (nonalcoholic fatty liver disease)    Low plasma alpha-1 antitrypsin    BMI 34.0-34.9,adult    Autism spectrum disorder    Panic attacks    Attention deficit hyperactivity disorder (ADHD), combined type    Tear film insufficiency    Myopia of both eyes     Past Medical History:   Diagnosis Date    ADHD     Autism     Constipation - functional     Multiple nevi     Social anxiety disorder     Vitreous floaters      Past Surgical History:   Procedure Laterality Date    HERNIA REPAIR  2000    hydrocelectomy    TESTICLE SURGERY  2001    hydrocele     Social History     Socioeconomic History    Marital status: Single   Tobacco Use    Smoking status: Passive Smoke Exposure - Never Smoker    Smokeless tobacco: Never Used    Tobacco comment: Dad smokes a pipe   Substance and Sexual Activity    Alcohol use: Not Currently     Alcohol/week: 0.0 standard drinks    Drug use: No    Sexual activity: Never   Social History Narrative    PAST MEDICAL HISTORY:     1. Constipation resolved.     2. Gross motor, fine motor delay. Completed PT.     3. Precocious puberty--Dr. Means predicts similar ht to his dad    4. Nevi - followed by Dr. [facility], appt soon, no current worries            FH: nevi-dad, PGF lung cancer , Parkinsons, CAD                 Social Determinants of Health     Financial Resource Strain: Low Risk     Difficulty of Paying Living Expenses: Not very hard   Food Insecurity: No Food Insecurity    Worried About Running Out of Food in the Last Year: Never true    Ran Out of Food in the Last Year: Never true   Transportation Needs: No Transportation Needs    Lack of Transportation (Medical): No    Lack of Transportation (Non-Medical): No   Physical Activity:  "Insufficiently Active    Days of Exercise per Week: 1 day    Minutes of Exercise per Session: 30 min   Stress: Stress Concern Present    Feeling of Stress : To some extent   Social Connections: Unknown    Frequency of Communication with Friends and Family: Three times a week    Frequency of Social Gatherings with Friends and Family: Once a week    Active Member of Clubs or Organizations: No    Attends Club or Organization Meetings: Patient refused    Marital Status: Never    Housing Stability: Unknown    Unable to Pay for Housing in the Last Year: Patient refused    Number of Places Lived in the Last Year: 1    Unstable Housing in the Last Year: No     Family History   Problem Relation Age of Onset    Nevi Father     Hyperlipidemia Father     Hypertension Father     Mental illness Father     Alcohol abuse Father     Asthma Sister     Blindness Maternal Grandmother     Glaucoma Maternal Grandmother     Alzheimer's disease Maternal Grandmother     Vision loss Maternal Grandmother     Cataracts Maternal Grandfather     Cancer Maternal Grandfather         SKIN CA    Heart disease Paternal Grandmother     Depression Paternal Grandmother     Mental illness Paternal Grandmother     Anxiety disorder Paternal Grandfather     Cancer Paternal Grandfather         prostate CA    Amblyopia Neg Hx     Diabetes Neg Hx     Retinal detachment Neg Hx     Strabismus Neg Hx     Cirrhosis Neg Hx            Objective:       Vitals:    06/02/22 0859   BP: 120/80   Pulse: 73   Weight: 115.4 kg (254 lb 6.6 oz)   Height: 5' 11" (1.803 m)   PainSc: 0-No pain     Body mass index is 35.48 kg/m².    Physical Exam  Vitals and nursing note reviewed.   Constitutional:       Appearance: He is well-developed. He is obese.   HENT:      Head: Normocephalic.      Right Ear: Tympanic membrane, ear canal and external ear normal. There is no impacted cerumen.      Left Ear: Tympanic membrane, ear canal and external " ear normal. There is no impacted cerumen.      Nose: Nose normal.      Mouth/Throat:      Mouth: Mucous membranes are moist.      Pharynx: Oropharynx is clear.   Eyes:      General: Lids are normal. Lids are everted, no foreign bodies appreciated.      Conjunctiva/sclera: Conjunctivae normal.      Pupils: Pupils are equal, round, and reactive to light.   Neck:      Vascular: No carotid bruit or JVD.      Trachea: Trachea normal.   Cardiovascular:      Rate and Rhythm: Normal rate and regular rhythm.      Pulses: Normal pulses.      Heart sounds: Normal heart sounds.   Pulmonary:      Effort: Pulmonary effort is normal.      Breath sounds: Normal breath sounds.   Abdominal:      General: Abdomen is flat. Bowel sounds are normal.      Palpations: Abdomen is soft.   Musculoskeletal:         General: Normal range of motion.      Cervical back: Full passive range of motion without pain, normal range of motion and neck supple.   Skin:     General: Skin is warm and dry.      Capillary Refill: Capillary refill takes less than 2 seconds.   Neurological:      General: No focal deficit present.      Mental Status: He is alert and oriented to person, place, and time.   Psychiatric:         Mood and Affect: Mood normal.         Speech: Speech normal.         Behavior: Behavior normal.         Thought Content: Thought content normal.         Judgment: Judgment normal.         Assessment:       Problem List Items Addressed This Visit        Neuro    Autism spectrum disorder       Psychiatric    HARITHA (generalized anxiety disorder)    Relevant Orders    TSH    Panic attacks    Relevant Orders    TSH    Attention deficit hyperactivity disorder (ADHD), combined type       Pulmonary    Low plasma alpha-1 antitrypsin       GI    Hepatomegaly    Relevant Orders    Comprehensive Metabolic Panel    NAFLD (nonalcoholic fatty liver disease)      Other Visit Diagnoses     Encounter for health maintenance examination    -  Primary    Routine  lab draw        Relevant Orders    CBC Auto Differential    Comprehensive Metabolic Panel    Lipid Panel    TSH    Encounter to establish care with new doctor        Relevant Orders    Ambulatory referral/consult to Internal Medicine    Screening cholesterol level        Relevant Orders    Lipid Panel    BMI 35.0-35.9,adult        Obesity (BMI 30-39.9)              Plan:        was seen today for annual exam.    Diagnoses and all orders for this visit:    Encounter for health maintenance examination  Annual wellness exam completed.    All medications, histories, and concerns reviewed, reconciled, and addressed.    Appropriate Screenings per pt's sex and age have been reviewed and discussed with pt.    BMI reviewed.  Routine lab draw  NAFLD (nonalcoholic fatty liver disease)  -     CBC Auto Differential; Future  -     Comprehensive Metabolic Panel; Future  -     Lipid Panel; Future  -     TSH; Future    Autism spectrum disorder  Attention deficit hyperactivity disorder (ADHD), combined type  HARITHA (generalized anxiety disorder)  -     TSH; Future  Panic attacks  -     TSH; Future    Stable, managed by Psych    Hepatomegaly  -     Comprehensive Metabolic Panel; Future    Has abd US scheduled in August with Hepatology f/u after    Encounter to establish care with new doctor  -     Ambulatory referral/consult to Internal Medicine; Future    Screening cholesterol level  -     Lipid Panel; Future    Low plasma alpha-1 antitrypsin  Stable    BMI 35.0-35.9,adult  BMI reviewed    Obesity (BMI 30-39.9)  BMI reviewed.    Diet and exercise to lose weight.    Fasting lab orders, will call with results, if results ok, RTC in 1 yr for annual or sooner prn with one of MDs I work with who can be your new PCP: Dr. Tonio Mackey, Dr. Ayaan Smith, Dr. Nae Rizzo, Dr. Flakita Denton, Dr. Evaristo Spears, or  Dr. King Scanlon, or Dr. Kennedi Gallo    Follow up in about 1 year (around 6/2/2023) for annual or sooner as needed with one of MDs  recommended on AVS.

## 2022-06-02 NOTE — PATIENT INSTRUCTIONS
Fasting lab orders, will call with results, if results ok, RTC in 1 yr for annual or sooner prn with one of MDs I work with who can be your new PCP: Dr. Tonio Mackey, Dr. Ayaan Smith, Dr. Nae Rizzo, Dr. Flakita Denton, Dr. Evaristo Spears, or  Dr. King Scanlon, or Dr. Kennedi Gallo

## 2022-08-18 ENCOUNTER — HOSPITAL ENCOUNTER (OUTPATIENT)
Dept: RADIOLOGY | Facility: OTHER | Age: 22
Discharge: HOME | End: 2022-08-18
Attending: NURSE PRACTITIONER
Payer: COMMERCIAL

## 2022-08-18 DIAGNOSIS — K76.0 NAFLD (NONALCOHOLIC FATTY LIVER DISEASE): ICD-10-CM

## 2022-08-18 DIAGNOSIS — K74.01 HEPATIC FIBROSIS, EARLY FIBROSIS: ICD-10-CM

## 2022-08-18 PROCEDURE — 76705 ECHO EXAM OF ABDOMEN: CPT | Mod: 26,,, | Performed by: RADIOLOGY

## 2022-08-18 PROCEDURE — 76705 US ABDOMEN LIMITED: ICD-10-PCS | Mod: 26,,, | Performed by: RADIOLOGY

## 2022-08-18 PROCEDURE — 76705 ECHO EXAM OF ABDOMEN: CPT | Mod: TC

## 2022-08-22 ENCOUNTER — OFFICE VISIT (OUTPATIENT)
Dept: HEPATOLOGY | Facility: CLINIC | Age: 22
End: 2022-08-22
Payer: COMMERCIAL

## 2022-08-22 VITALS
HEART RATE: 74 BPM | WEIGHT: 263 LBS | OXYGEN SATURATION: 96 % | SYSTOLIC BLOOD PRESSURE: 122 MMHG | RESPIRATION RATE: 18 BRPM | DIASTOLIC BLOOD PRESSURE: 67 MMHG | BODY MASS INDEX: 36.82 KG/M2 | HEIGHT: 71 IN | TEMPERATURE: 97 F

## 2022-08-22 DIAGNOSIS — K76.0 NAFLD (NONALCOHOLIC FATTY LIVER DISEASE): Primary | ICD-10-CM

## 2022-08-22 PROCEDURE — 99214 PR OFFICE/OUTPT VISIT, EST, LEVL IV, 30-39 MIN: ICD-10-PCS | Mod: S$GLB,,, | Performed by: NURSE PRACTITIONER

## 2022-08-22 PROCEDURE — 99999 PR PBB SHADOW E&M-EST. PATIENT-LVL III: CPT | Mod: PBBFAC,,, | Performed by: NURSE PRACTITIONER

## 2022-08-22 PROCEDURE — 99999 PR PBB SHADOW E&M-EST. PATIENT-LVL III: ICD-10-PCS | Mod: PBBFAC,,, | Performed by: NURSE PRACTITIONER

## 2022-08-22 PROCEDURE — 3008F BODY MASS INDEX DOCD: CPT | Mod: CPTII,S$GLB,, | Performed by: NURSE PRACTITIONER

## 2022-08-22 PROCEDURE — 99214 OFFICE O/P EST MOD 30 MIN: CPT | Mod: S$GLB,,, | Performed by: NURSE PRACTITIONER

## 2022-08-22 PROCEDURE — 3074F SYST BP LT 130 MM HG: CPT | Mod: CPTII,S$GLB,, | Performed by: NURSE PRACTITIONER

## 2022-08-22 PROCEDURE — 1159F PR MEDICATION LIST DOCUMENTED IN MEDICAL RECORD: ICD-10-PCS | Mod: CPTII,S$GLB,, | Performed by: NURSE PRACTITIONER

## 2022-08-22 PROCEDURE — 3008F PR BODY MASS INDEX (BMI) DOCUMENTED: ICD-10-PCS | Mod: CPTII,S$GLB,, | Performed by: NURSE PRACTITIONER

## 2022-08-22 PROCEDURE — 1159F MED LIST DOCD IN RCRD: CPT | Mod: CPTII,S$GLB,, | Performed by: NURSE PRACTITIONER

## 2022-08-22 PROCEDURE — 3078F DIAST BP <80 MM HG: CPT | Mod: CPTII,S$GLB,, | Performed by: NURSE PRACTITIONER

## 2022-08-22 PROCEDURE — 3074F PR MOST RECENT SYSTOLIC BLOOD PRESSURE < 130 MM HG: ICD-10-PCS | Mod: CPTII,S$GLB,, | Performed by: NURSE PRACTITIONER

## 2022-08-22 PROCEDURE — 3078F PR MOST RECENT DIASTOLIC BLOOD PRESSURE < 80 MM HG: ICD-10-PCS | Mod: CPTII,S$GLB,, | Performed by: NURSE PRACTITIONER

## 2022-08-22 NOTE — PROGRESS NOTES
[facility] - Established Patient    Last Clinic Visit: 8/9/21    Chief Complaint: Follow-up for fatty liver       HISTORY     This is a 22 y.o. male with PMH noted below, here for follow-up of fatty liver. Mother present for visit.     Initial referral for elevated ALT, 50-60.    Serological workup remarkable for A1AT phenotype MZ and level noted to be low, 72.    His US in 2019 showed hepatomegaly and Fibroscan with significant steatosis.      Fibrosis staging:   Fibroscan 5/2019 = F0-F1 (kPa 5.4), S3  Fibroscan 8/2021 = F2 (kPa 8.1), S3    Most recent imaging:  Abd US 8/18/22- mild hepatomegaly, hepatic steatosis, no liver lesions, spleen UNL     Interval history:  Liver enzymes continue to improve with weight loss, now in the 20s.     Still has intermittent abdominal pain to R/L side. Otherwise feels well.    Updates on risk factors for fatty liver:  · Weight -- Body mass index is 36.68 kg/m².  · Trending down, max weight 278 -- 263 lb today  · Cut out sodas, eating healthier snacks and less fast food  · Dyslipidemia -- well controlled                               · Insulin resistance / diabetes -- no, HgbA1c 5.2         · Hypertension -- BP well controlled   · Alcohol use -- rare, every other week           Past medical history, surgical history, problem list, family history, social history, allergies: Reviewed and updated in the appropriate section of the electronic medical record.      Current Outpatient Medications   Medication Sig Dispense Refill    busPIRone (BUSPAR) 5 MG Tab Take 1 tablet (5 mg total) by mouth 2 (two) times daily. 180 tablet 3    desvenlafaxine succinate (PRISTIQ) 100 MG Tb24 Take 1 tablet (100 mg total) by mouth once daily. 90 tablet 3    propranoloL (INDERAL) 40 MG tablet Take 1 tablet (40 mg total) by mouth 2 (two) times daily. 180 tablet 3     No current facility-administered medications for this visit.     Medication list reviewed and updated.      Review of Systems  "  Constitutional: Negative for fatigue or unexpected weight change.   Respiratory: Negative for shortness of breath.    Cardiovascular: Negative for leg swelling.  Gastrointestinal: Negative for abdominal distention, diarrhea, constipation, nausea, and vomiting. Negative for blood in stool, melena, or hematemesis. +intermittent abdominal pain  Musculoskeletal: Negative for myalgias.    Skin: Negative for itching.  Neurological: Negative for dizziness or tremors. Negative for confusion, slowed mentation, or memory loss.   Hematological: Does not bruise/bleed easily.   Psychiatric: Negative for mood changes or sleep disturbance.      Physical Exam   Constitutional: Well-nourished. No distress. Alert and oriented.  Eyes: No scleral icterus.   Pulmonary/Chest: Respiratory effort normal. No respiratory distress.   Abdominal: No distension, no ascites appreciated.   Extremities: No edema.   Neurological: No tremor or asterixis. Gait normal.  Skin: No jaundice. No spider telangiectasias or palmar erythema.  Psychiatric: Normal mood and affect. Speech, behavior, and thought content normal. No depression or anxiety noted.       Vitals reviewed.  /67 (BP Location: Right arm, Patient Position: Sitting, BP Method: Large (Automatic))   Pulse 74   Temp 96.8 °F (36 °C) (Oral)   Resp 18   Ht 5' 11" (1.803 m)   Wt 119.3 kg (263 lb 0.1 oz)   SpO2 96%   BMI 36.68 kg/m²       LABS & DIAGNOSTIC STUDIES     I have personally reviewed pertinent laboratory findings:    Lab Results   Component Value Date    ALT 28 06/02/2022    AST 22 06/02/2022    ALKPHOS 73 06/02/2022    BILITOT 0.3 06/02/2022    ALBUMIN 4.2 06/02/2022       Lab Results   Component Value Date    WBC 7.67 06/02/2022    HGB 13.1 (L) 06/02/2022    HCT 40.5 06/02/2022    MCV 86 06/02/2022     06/02/2022       Lab Results   Component Value Date     06/02/2022    K 4.4 06/02/2022    BUN 16 06/02/2022    CREATININE 0.8 06/02/2022    ESTGFRAFRICA >60.0 " 06/02/2022    EGFRNONAA >60.0 06/02/2022       Lab Results   Component Value Date    SMOOTHMUSCAB Negative 1:40 05/16/2019    AMAIFA Negative 1:40 05/16/2019    IGGSERUM 938 05/16/2019    ANASCREEN Negative <1:160 05/16/2019    FERRITIN 58 04/27/2021    FESATURATED 12 (L) 04/27/2021    XMCEL2FVZFRY MZ 05/16/2019    SXNAY4NEOAND 72 (L) 05/16/2019    CERULOPLSM 30.0 05/16/2019    HEPBSAG Negative 05/16/2019    HEPCAB Negative 05/16/2019    SBZ62VBXJ Negative 04/27/2021       No results found for: AFP    I have personally reviewed the following result reports:  Abdominal US - 8/18/22      ASSESSMENT & PLAN     22 y.o. male with:    1. NAFLD with possible hepatic fibrosis  -- Labs, imaging, and prior fibrosis staging reviewed. Liver enzymes have normalized with weight loss.  -- Last Fibroscan with significant steatosis but also suggestive of moderate fibrosis (F2), will reassess later this year.  -- Continue abd US every 1-2 years  -- Reminded that the only treatment for fatty liver is weight loss and maintaining good control of metabolic risk factors (blood pressure, cholesterol, and blood sugar).  -- Ineligible for ESTES clinical trials due to A1AT deficiency    2. Body mass index is 36.68 kg/m².  -- We reviewed his risk factors for fatty liver  -- Commended on dietary changes and weight loss success, encouraged to continue these efforts    3. A1AT phenotype MZ, low A1AT level  -- No respiratory symptoms though recommend consult w/ Pulmonology         Orders Placed This Encounter   Procedures    FibroScan (Vibration Controlled Transient Elastography)       *See AVS for patient education and instructions.      Return to clinic in 1 year.       Thank you for allowing me to participate in the care of ANNA James III  Hepatology        Duration of encounter: 30 min  This includes face to face time and non-face to face time preparing to see the patient (eg, review of tests), obtaining  and/or reviewing separately obtained history, documenting clinical information in the electronic or other health record, independently interpreting results and communicating results to the patient/family/caregiver, or care coordination.

## 2022-08-22 NOTE — PATIENT INSTRUCTIONS
Update Fibroscan later this year to reassess liver scarring/fibrosis from fatty liver        Fatty liver    There is no FDA approved therapy for non-alcoholic fatty liver disease (NAFLD); therefore, lifestyle changes are important:  1. Ongoing weight loss efforts   2. Low carb/sugar, high protein diet.   3. Exercise, 5 days per week, 30 minutes per day, as tolerated  4. Recommend good control of cholesterol, blood pressure, blood sugar levels (as these are risk factors for fatty liver). Cholesterol lowering medications including statins are typically safe and beneficial (even if liver enzymes are elevated).    5. Limit alcohol consumption, no more than 2 serving(s) of alcohol in any day (1 serving is 5 ounces of wine, 12 ounces of beer, or 1.5 ounces of liquor). Do not recommend daily alcohol use.      In some people, fatty liver can progress to steatohepatitis (inflamatory fatty liver) and possibly to cirrhosis, putting one at increased risk for liver cancer and liver failure. Lifestyle changes can help to decrease this risk.     Ask about our fatty liver/ESTES clinical trials if you have fibrosis / scar tissue related to fatty liver.        Additional Resources:    Websites with information about fatty liver and inflammation related to fatty liver (ESTES): www.[email or web address].[email or web address] and www.nashactually.com   [facility]: Non-Alcoholic Fatty Liver Disease (NAFLD)    Facebook support group with tips and recipes:   Non-Alcoholic Fatty Liver Disease (NAFLD) Diet & Nutrition Support     Can download MyFitness Pal or WEALTH at work It fior to add up your carbohydrates throughout the day.   Tip -- Avoid beverages with calories or carbohydrates.   Try [email or web address].[email or web address] for recipes.    If interested in seeing a dietician to create a weight loss plan, contact the dietician team at [facility]: [email or web address].  You can also call to schedule a consult with a dietician: [phone]  *Virtual visits are available with one of our  dieticians.     If you have diabetes or high blood pressure, you can meet with a Health  through [employer]'s [facility] program. Let us know if you are interested in a referral.     Let us know if you are interested in a referral to [facility].

## 2022-11-23 ENCOUNTER — OFFICE VISIT (OUTPATIENT)
Dept: DERMATOLOGY | Facility: CLINIC | Age: 22
End: 2022-11-23
Payer: COMMERCIAL

## 2022-11-23 ENCOUNTER — OFFICE VISIT (OUTPATIENT)
Dept: PSYCHIATRY | Facility: CLINIC | Age: 22
End: 2022-11-23
Payer: COMMERCIAL

## 2022-11-23 DIAGNOSIS — F41.0 PANIC ATTACKS: ICD-10-CM

## 2022-11-23 DIAGNOSIS — F41.1 GAD (GENERALIZED ANXIETY DISORDER): Primary | ICD-10-CM

## 2022-11-23 DIAGNOSIS — F84.0 AUTISM SPECTRUM DISORDER: ICD-10-CM

## 2022-11-23 DIAGNOSIS — F90.2 ATTENTION DEFICIT HYPERACTIVITY DISORDER (ADHD), COMBINED TYPE: ICD-10-CM

## 2022-11-23 DIAGNOSIS — L64.9 ANDROGENETIC ALOPECIA: Primary | ICD-10-CM

## 2022-11-23 PROCEDURE — 99214 OFFICE O/P EST MOD 30 MIN: CPT | Mod: 95,,, | Performed by: INTERNAL MEDICINE

## 2022-11-23 PROCEDURE — 99204 PR OFFICE/OUTPT VISIT, NEW, LEVL IV, 45-59 MIN: ICD-10-PCS | Mod: 95,,, | Performed by: STUDENT IN AN ORGANIZED HEALTH CARE EDUCATION/TRAINING PROGRAM

## 2022-11-23 PROCEDURE — 1160F RVW MEDS BY RX/DR IN RCRD: CPT | Mod: CPTII,95,, | Performed by: STUDENT IN AN ORGANIZED HEALTH CARE EDUCATION/TRAINING PROGRAM

## 2022-11-23 PROCEDURE — 1159F MED LIST DOCD IN RCRD: CPT | Mod: CPTII,95,, | Performed by: STUDENT IN AN ORGANIZED HEALTH CARE EDUCATION/TRAINING PROGRAM

## 2022-11-23 PROCEDURE — 1159F PR MEDICATION LIST DOCUMENTED IN MEDICAL RECORD: ICD-10-PCS | Mod: CPTII,95,, | Performed by: STUDENT IN AN ORGANIZED HEALTH CARE EDUCATION/TRAINING PROGRAM

## 2022-11-23 PROCEDURE — 1160F PR REVIEW ALL MEDS BY PRESCRIBER/CLIN PHARMACIST DOCUMENTED: ICD-10-PCS | Mod: CPTII,95,, | Performed by: STUDENT IN AN ORGANIZED HEALTH CARE EDUCATION/TRAINING PROGRAM

## 2022-11-23 PROCEDURE — 99214 PR OFFICE/OUTPT VISIT, EST, LEVL IV, 30-39 MIN: ICD-10-PCS | Mod: 95,,, | Performed by: INTERNAL MEDICINE

## 2022-11-23 PROCEDURE — 99204 OFFICE O/P NEW MOD 45 MIN: CPT | Mod: 95,,, | Performed by: STUDENT IN AN ORGANIZED HEALTH CARE EDUCATION/TRAINING PROGRAM

## 2022-11-23 RX ORDER — FINASTERIDE 1 MG/1
1 TABLET, FILM COATED ORAL DAILY
Qty: 30 TABLET | Refills: 5 | Status: SHIPPED | OUTPATIENT
Start: 2022-11-23 | End: 2022-12-23

## 2022-11-23 NOTE — PROGRESS NOTES
Patient Information  Name: Royal HALLEY Celaya III  : 2000  MRN: [id number]     Referring Physician:  Dr. Lindquist   Primary Care Physician:  Dr. Flakita Denton MD   Date of Visit: 2022      Subjective:       Royal HALLEY Celaya III is a 22 y.o. male who presents for hair loss    HPI  The patient location is: [place], [place]  The chief complaint leading to consultation is: hair loss    Visit type: audiovisual    Face to Face time with patient: 8 min  10 minutes of total time spent on the encounter, which includes face to face time and non-face to face time preparing to see the patient (eg, review of tests), Obtaining and/or reviewing separately obtained history, Documenting clinical information in the electronic or other health record, Independently interpreting results (not separately reported) and communicating results to the patient/family/caregiver, or Care coordination (not separately reported).     Each patient to whom he or she provides medical services by telemedicine is:  (1) informed of the relationship between the physician and patient and the respective role of any other health care provider with respect to management of the patient; and (2) notified that he or she may decline to receive medical services by telemedicine and may withdraw from such care at any time.    Notes:   Patient with new complaint of hair loss  Location: scalp  Duration: 5 years  Symptoms: hair loss  Relieving factors/Previous treatments: shampoo, topical minoxidil--was not satisfied due to application process being too cumbersome    Patient states that he is losing hair at the top of scalp. Mom states that he is undergoing stress at school.    +family hx of hair loss    Patient was last seen:Visit date not found     Prior notes by myself reviewed.   Clinical documentation obtained by nursing staff reviewed.    Review of Systems   Skin:  Negative for itching and rash.      Objective:    Physical Exam   Constitutional: He  appears well-developed and well-nourished. No distress.   Neurological: He is alert and oriented to person, place, and time. He is not disoriented.   Psychiatric: He has a normal mood and affect.   Skin:   Areas Examined (abnormalities noted in diagram):   Scalp / Hair Palpated and Inspected            Diagram Legend     Erythematous scaling macule/papule c/w actinic keratosis       Vascular papule c/w angioma      Pigmented verrucoid papule/plaque c/w seborrheic keratosis      Yellow umbilicated papule c/w sebaceous hyperplasia      Irregularly shaped tan macule c/w lentigo     1-2 mm smooth white papules consistent with Milia      Movable subcutaneous cyst with punctum c/w epidermal inclusion cyst      Subcutaneous movable cyst c/w pilar cyst      Firm pink to brown papule c/w dermatofibroma      Pedunculated fleshy papule(s) c/w skin tag(s)      Evenly pigmented macule c/w junctional nevus     Mildly variegated pigmented, slightly irregular-bordered macule c/w mildly atypical nevus      Flesh colored to evenly pigmented papule c/w intradermal nevus       Pink pearly papule/plaque c/w basal cell carcinoma      Erythematous hyperkeratotic cursted plaque c/w SCC      Surgical scar with no sign of skin cancer recurrence      Open and closed comedones      Inflammatory papules and pustules      Verrucoid papule consistent consistent with wart     Erythematous eczematous patches and plaques     Dystrophic onycholytic nail with subungual debris c/w onychomycosis     Umbilicated papule    Erythematous-base heme-crusted tan verrucoid plaque consistent with inflamed seborrheic keratosis     Erythematous Silvery Scaling Plaque c/w Psoriasis     See annotation          [] Data reviewed  [] Independent review of test  [] Management discussed with another provider    Assessment / Plan:        Androgenetic alopecia  -     finasteride (PROPECIA) 1 mg tablet; Take 1 tablet (1 mg total) by mouth once daily.  Dispense: 30 tablet;  Refill: 5  Discussed benefits and risk of Propecia including but not limited sexual dysfunction (approx 1%). There is also a not-well understood syndrome that is very rare: Post-finasteride syndrome that can result in irreversible sexual dysfunction including low libido.       LOS NUMBER AND COMPLEXITY OF PROBLEMS    COMPLEXITY OF DATA RISK TOTAL TIME (m)   31543  65204 [] 1 self-limited or minor problem [x] Minimal to none [] No treatment recommended or patient to monitor 15-29  10-19 99203  61077 Low  [] 2 or > self limited or minor problems  [] 1 stable chronic illness  [] 1 acute, uncomplicated illness or injury Limited (2)  [] Prior external notes from each unique source  [] Review result of each unique test  [] Order each unique test []  Low  OTC medications, minor skin biopsy 30-44  20-29   12242  20829 Moderate  [x]  1 or > chronic illness with progression, exacerbation or SE of treatment  []  2 or more stable chronic illnesses  []  1 acute illness with systemic symptoms  []  1 acute complicated injury  []  1 undiagnosed new problem with uncertain prognosis Moderate (1/3 below)  []  3 or more data items        *Now includes assessment requiring independent historian  []  Independent interpretation of a test  []  Discuss management/test with another provider Moderate  [x]  Prescription drug mgmt  []  Minor surgery with risk discussed  []  Mgmt limited by social determinates 45-59  30-39   13919  21748 High  []  1 or more chronic illness with severe exacerbation, progression or SE of treatment  []  1 acute or chronic illness/injury that poses a threat to life or bodily function Extensive (2/3 below)  []  3 or more data items        *Now includes assessment requiring independent historian.  []  Independent interpretation of a test  []  Discuss management/test with another provider High  []  Major surgery with risk discussed  []  Drug therapy requiring intensive monitoring for toxicity  []  Hospitalization  []   Decision for DNR 60-74  40-54      No follow-ups on file.    Gifty Tillman MD, FAAD  [facility]

## 2022-11-23 NOTE — PROGRESS NOTES
OUTPATIENT PSYCHIATRY RETURN VISIT    ENCOUNTER DATE:  12/4/2022  SITE:  [facility], [facility]  LENGTH OF SESSION:  20 minutes    The patient location is:  [place], not in healthcare facility  The chief complaint leading to consultation is:  Anxiety    Visit type:  audiovisual    Face to Face time with patient:  20 minutes  25 minutes of total time spent on the encounter, which includes face to face time and non-face to face time preparing to see the patient (eg, review of tests), Obtaining and/or reviewing separately obtained history, Documenting clinical information in the electronic or other health record, Independently interpreting results (not separately reported) and communicating results to the patient/family/caregiver, or Care coordination (not separately reported).     Each patient to whom he or she provides medical services by telemedicine is:  (1) informed of the relationship between the physician and patient and the respective role of any other health care provider with respect to management of the patient; and (2) notified that he or she may decline to receive medical services by telemedicine and may withdraw from such care at any time.    CHIEF COMPLAINT:  Anxiety      HISTORY OF PRESENTING ILLNESS:  Royal HALLEY Celaya III is a 22 y.o. male with history of Anxiety, ADHD, and Autism spectrum disorder who presents for follow up appointment.      Plan at last appointment on 5/30/2022:  Will increase Buspar to 5mg BID and Propranolol to 40mg BID for anxious days.  When anxiety is low, can continue once daily if he wishes.    Continue Pristiq 100mg daily.  Discussed with patient informed consent, risks versus benefits, alternative treatments, side effect profile and the inherent unpredictability of individual responses to these treatments.  The patient expresses understanding of the above and displays the capacity to agree with this current plan.    History as told by patient:  Doing final  year of masters degree.  Bass playing has gone really well.  Does have big recital next month.  Lives in the dorms at his school - people next to him got aggressive about trying to get him to stop practicing (they were being rude, banging on his walls, had to get RA involved).  This has now been settled.  Needing to find pianist for concert - this is really stressing him out.  Has had some pain in chest some recently.  Believes it is anxiety.  Has had a good time hanging out with some of the other students.  Has felt like he is getting angry more often recently.  When he gets angry he yells and uses profanity.  Although usually not very confrontational with strangers.  He has been showering a lot more - over the summer made it a habit.  Has been keeping his room very clean recently.  Has been also cleaning his bass - perhaps a bit too much.  Sleeping well at night.      Medication side effects:  No  Medication compliance:  Yes    PSYCHIATRIC REVIEW OF SYSTEMS:  Trouble with sleep:  Denies  Appetite changes:  Denies  Weight changes:  Denies  Lack of energy:  Denies  Anhedonia:  Denies  Somatic symptoms:  Denies  Libido:  Not discussed  Anxiety/panic:  Yes as above  Guilty/hopeless:  Denies  Self-injurious behavior/risky behavior:  Denies  Any drugs:  Denies  Alcohol:  Occasional    MEDICAL REVIEW OF SYSTEMS:  Complete review of systems performed covering Constitutional, Musculoskeletal, Neurologic.  All systems negative except for that covered in HPI.    PAST PSYCHIATRIC, MEDICAL, AND SOCIAL HISTORY REVIEWED  The patient's past medical, family and social history have been reviewed and updated as appropriate within the electronic medical record - see encounter notes.    MEDICATIONS:    Current Outpatient Medications:     busPIRone (BUSPAR) 5 MG Tab, Take 1 tablet (5 mg total) by mouth 2 (two) times daily., Disp: 180 tablet, Rfl: 3    desvenlafaxine succinate (PRISTIQ) 100 MG Tb24, Take 1 tablet (100 mg total) by mouth  "once daily., Disp: 90 tablet, Rfl: 3    finasteride (PROPECIA) 1 mg tablet, Take 1 tablet (1 mg total) by mouth once daily., Disp: 30 tablet, Rfl: 5    propranoloL (INDERAL) 40 MG tablet, Take 1 tablet (40 mg total) by mouth 2 (two) times daily., Disp: 180 tablet, Rfl: 3    ALLERGIES:  Review of patient's allergies indicates:   Allergen Reactions    Pcn [penicillins] Hives       PSYCHIATRIC EXAM:  There were no vitals filed for this visit.  Appearance:  Well groomed, appearing healthy and of stated age  Behavior:  Cooperative, pleasant, no psychomotor agitation or retardation  Speech:  Normal rate, rhythm, prosody, and volume  Mood:  "Pretty good"  Affect:  Flat (chronic)  Thought Process:  Linear, logical, goal directed  Thought Content:  Negative for suicidal ideation, homicidal ideation, delusions or hallucinations.  Associations:  Intact  Memory:  Grossly Intact  Level of Consciousness/Orientation:  Grossly intact  Fund of Knowledge:  Good  Attention:  Good  Language:  Fluent, able to name abstract and concrete objects  Insight:  Fair  Judgment:  Fair  Psychomotor signs:  No abnormal movements of face  Gait:  Unable to assess via virtual visit    RELEVANT LABS/STUDIES:    Lab Results   Component Value Date    WBC 7.67 06/02/2022    HGB 13.1 (L) 06/02/2022    HCT 40.5 06/02/2022    MCV 86 06/02/2022     06/02/2022     BMP  Lab Results   Component Value Date     06/02/2022    K 4.4 06/02/2022     06/02/2022    CO2 23 06/02/2022    BUN 16 06/02/2022    CREATININE 0.8 06/02/2022    CALCIUM 9.4 06/02/2022    ANIONGAP 8 06/02/2022    ESTGFRAFRICA >60.0 06/02/2022    EGFRNONAA >60.0 06/02/2022     Lab Results   Component Value Date    ALT 28 06/02/2022    AST 22 06/02/2022    ALKPHOS 73 06/02/2022    BILITOT 0.3 06/02/2022     Lab Results   Component Value Date    TSH 0.805 06/02/2022     Lab Results   Component Value Date    HGBA1C 5.2 04/27/2021       IMPRESSION:    Royal HALLEY Celaya III is a 22 " y.o. male with history of Anxiety, ADHD, and Autism spectrum disorder who presents for follow up appointment.      Status/Progress:  Based on the examination today, the patient's problem(s) is/are adequately but not ideally controlled.  New problems have not been presented today.     Risk Parameters:  Patient reports no suicidal ideation  Patient reports no homicidal ideation  Patient reports no self-injurious behavior  Patient reports no violent behavior    DIAGNOSES:    ICD-10-CM ICD-9-CM   1. HARITHA (generalized anxiety disorder)  F41.1 300.02   2. Panic attacks  F41.0 300.01   3. Attention deficit hyperactivity disorder (ADHD), combined type  F90.2 314.01   4. Autism spectrum disorder  F84.0 299.00       PLAN:  Discussed recommendation to increase Buspar to 5mg BID and Propranolol to 40mg BID.  Continue Pristiq 100mg daily.  Discussed with patient informed consent, risks versus benefits, alternative treatments, side effect profile and the inherent unpredictability of individual responses to these treatments.  The patient expresses understanding of the above and displays the capacity to agree with this current plan.    RETURN TO CLINIC:  Follow up in about 6 months (around 5/23/2023).

## 2022-12-19 ENCOUNTER — PROCEDURE VISIT (OUTPATIENT)
Dept: HEPATOLOGY | Facility: CLINIC | Age: 22
End: 2022-12-19
Payer: COMMERCIAL

## 2022-12-19 DIAGNOSIS — K76.0 NAFLD (NONALCOHOLIC FATTY LIVER DISEASE): ICD-10-CM

## 2022-12-19 PROCEDURE — 76981 FIBROSCAN (VIBRATION CONTROLLED TRANSIENT ELASTOGRAPHY): ICD-10-PCS | Mod: S$GLB,,, | Performed by: NURSE PRACTITIONER

## 2022-12-19 PROCEDURE — 76981 USE PARENCHYMA: CPT | Mod: S$GLB,,, | Performed by: NURSE PRACTITIONER

## 2022-12-21 ENCOUNTER — PATIENT MESSAGE (OUTPATIENT)
Dept: HEPATOLOGY | Facility: CLINIC | Age: 22
End: 2022-12-21
Payer: COMMERCIAL

## 2022-12-21 NOTE — PROCEDURES
FibroScan (Vibration Controlled Transient Elastography)    Date/Time: 12/19/2022 8:00 AM  Performed by: Justa Garner NP  Authorized by: Justa Garner NP     Diagnosis:  NAFLD    Probe:  XL    Universal Protocol: Patient's identity, procedure and site were verified, confirmatory pause was performed.  Discussed procedure including risks and potential complications.  Questions answered.  Patient verbalizes understanding and wishes to proceed with VCTE.     Procedure: After providing explanations of the procedure, patient was placed in the supine position with right arm in maximum abduction to allow optimal exposure of right lateral abdomen.  Patient was briefly assessed, Testing was performed in the mid-axillary location, 50Hz Shear Wave pulses were applied and the resulting Shear Wave and Propagation Speed detected with a 3.5 MHz ultrasonic signal, using the FibroScan probe, Skin to liver capsule distance and liver parenchyma were accessed during the entire examination with the FibroScan probe, Patient was instructed to breathe normally and to abstain from sudden movements during the procedure, allowing for random measurements of liver stiffness. At least 10 Shear Waves were produced, Individual measurements of each Shear Wave were calculated.  Patient tolerated the procedure well with no complications.  Meets discharge criteria as was dismissed.  Rates pain 0 out of 10.  Patient will follow up with ordering provider to review results.    Findings  Median liver stiffness score:  4.5  CAP Reading: dB/m:  347    IQR/med %:  13  Interpretation  Fibrosis interpretation is based on medial liver stiffness - Kilopascal (kPa).    Fibrosis Stage:  F 0-1  Steatosis interpretation is based on controlled attenuation parameter - (dB/m).    Steatosis Grade:  S3

## 2023-04-15 ENCOUNTER — PATIENT MESSAGE (OUTPATIENT)
Dept: PSYCHIATRY | Facility: CLINIC | Age: 23
End: 2023-04-15
Payer: COMMERCIAL

## 2023-05-05 DIAGNOSIS — F41.1 GAD (GENERALIZED ANXIETY DISORDER): ICD-10-CM

## 2023-05-05 DIAGNOSIS — F41.0 PANIC ATTACKS: ICD-10-CM

## 2023-05-05 RX ORDER — PROPRANOLOL HYDROCHLORIDE 40 MG/1
TABLET ORAL
Qty: 90 TABLET | Refills: 3 | Status: SHIPPED | OUTPATIENT
Start: 2023-05-05 | End: 2023-05-25 | Stop reason: SDUPTHER

## 2023-05-25 ENCOUNTER — OFFICE VISIT (OUTPATIENT)
Dept: PSYCHIATRY | Facility: CLINIC | Age: 23
End: 2023-05-25
Payer: COMMERCIAL

## 2023-05-25 DIAGNOSIS — F41.0 PANIC ATTACKS: ICD-10-CM

## 2023-05-25 DIAGNOSIS — F41.8 PERFORMANCE ANXIETY: ICD-10-CM

## 2023-05-25 DIAGNOSIS — F84.0 AUTISM SPECTRUM DISORDER: ICD-10-CM

## 2023-05-25 DIAGNOSIS — F90.2 ATTENTION DEFICIT HYPERACTIVITY DISORDER (ADHD), COMBINED TYPE: ICD-10-CM

## 2023-05-25 DIAGNOSIS — F41.1 GAD (GENERALIZED ANXIETY DISORDER): Primary | ICD-10-CM

## 2023-05-25 PROCEDURE — 99214 OFFICE O/P EST MOD 30 MIN: CPT | Mod: 95,,, | Performed by: INTERNAL MEDICINE

## 2023-05-25 PROCEDURE — 99214 PR OFFICE/OUTPT VISIT, EST, LEVL IV, 30-39 MIN: ICD-10-PCS | Mod: 95,,, | Performed by: INTERNAL MEDICINE

## 2023-05-25 RX ORDER — HYDROXYZINE HYDROCHLORIDE 25 MG/1
25 TABLET, FILM COATED ORAL 2 TIMES DAILY PRN
Qty: 60 TABLET | Refills: 2 | Status: SHIPPED | OUTPATIENT
Start: 2023-05-25 | End: 2023-08-20

## 2023-05-25 RX ORDER — PROPRANOLOL HYDROCHLORIDE 40 MG/1
40 TABLET ORAL 2 TIMES DAILY
Qty: 180 TABLET | Refills: 3 | Status: SHIPPED | OUTPATIENT
Start: 2023-05-25

## 2023-05-25 RX ORDER — DESVENLAFAXINE 100 MG/1
100 TABLET, EXTENDED RELEASE ORAL DAILY
Qty: 90 TABLET | Refills: 3 | Status: SHIPPED | OUTPATIENT
Start: 2023-05-25

## 2023-05-25 RX ORDER — BUSPIRONE HYDROCHLORIDE 5 MG/1
5 TABLET ORAL 2 TIMES DAILY
Qty: 180 TABLET | Refills: 3 | Status: SHIPPED | OUTPATIENT
Start: 2023-05-25

## 2023-05-25 NOTE — PROGRESS NOTES
OUTPATIENT PSYCHIATRY RETURN VISIT    ENCOUNTER DATE:  6/10/2023  SITE:  [facility], [facility]  LENGTH OF SESSION:  20 minutes    The patient location is:  [place], not in healthcare facility  Visit type:  audiovisual    Face to Face time with patient:  20 minutes  25 minutes of total time spent on the encounter, which includes face to face time and non-face to face time preparing to see the patient (eg, review of tests), Obtaining and/or reviewing separately obtained history, Documenting clinical information in the electronic or other health record, Independently interpreting results (not separately reported) and communicating results to the patient/family/caregiver, or Care coordination (not separately reported).     Each patient to whom he or she provides medical services by telemedicine is:  (1) informed of the relationship between the physician and patient and the respective role of any other health care provider with respect to management of the patient; and (2) notified that he or she may decline to receive medical services by telemedicine and may withdraw from such care at any time.    CHIEF COMPLAINT:  Anxiety      HISTORY OF PRESENTING ILLNESS:  Royal HALLEY Celaya III is a 23 y.o. male with history of Anxiety, ADHD, and Autism spectrum disorder who presents for follow up appointment.  He presents today accompanied by his mother for part of the appointment.    Plan at last appointment on 11/23/2022:  Discussed recommendation to increase Buspar to 5mg BID and Propranolol to 40mg BID.  Continue Pristiq 100mg daily.  Discussed with patient informed consent, risks versus benefits, alternative treatments, side effect profile and the inherent unpredictability of individual responses to these treatments.  The patient expresses understanding of the above and displays the capacity to agree with this current plan.    History as told by patient:  Graduated from his master's program last week.  Now  home from [place].  There is a professional studies degree he may apply to - 1 year program - a lot of training for orchestral auditions.  He would like to be in an orchestra.  He was very anxious during his recital - hydroxyzine was helpful.  Felt fine once he got on the stage.  Only taking propranolol and Buspar once daily.  Took it for awhile BID when he was feeling nervous about a bump on his skin.  Mother says days leading up to the recital, he was panicking and crying.  Mother says he was taking extra Buspar at that time (patient does not remember).  Urgent Care gave him hydroxyzine - helped prevent him from crying.  Denies symptoms of depression.  In the past, patient took Ativan 0.5mg PRN panic attack.      Medication side effects:  No  Medication compliance:  Yes    PSYCHIATRIC REVIEW OF SYSTEMS:  Trouble with sleep:  Denies  Appetite changes:  Denies  Weight changes:  Denies  Lack of energy:  Denies  Anhedonia:  Denies  Somatic symptoms:  Denies  Libido:  Not discussed  Anxiety/panic:  Sometimes, not currently  Guilty/hopeless:  Denies  Self-injurious behavior/risky behavior:  Denies  Any drugs:  Denies  Alcohol:  Occasional    MEDICAL REVIEW OF SYSTEMS:  Complete review of systems performed covering Constitutional, Musculoskeletal, Neurologic.  All systems negative except for that covered in HPI.    PAST PSYCHIATRIC, MEDICAL, AND SOCIAL HISTORY REVIEWED  The patient's past medical, family and social history have been reviewed and updated as appropriate within the electronic medical record - see encounter notes.    MEDICATIONS:    Current Outpatient Medications:     busPIRone (BUSPAR) 5 MG Tab, Take 1 tablet (5 mg total) by mouth 2 (two) times daily., Disp: 180 tablet, Rfl: 3    desvenlafaxine succinate (PRISTIQ) 100 MG Tb24, Take 1 tablet (100 mg total) by mouth once daily., Disp: 90 tablet, Rfl: 3    hydrOXYzine HCL (ATARAX) 25 MG tablet, Take 1 tablet (25 mg total) by mouth 2 (two) times daily as  "needed for Anxiety., Disp: 60 tablet, Rfl: 2    propranoloL (INDERAL) 40 MG tablet, Take 1 tablet (40 mg total) by mouth 2 (two) times daily., Disp: 180 tablet, Rfl: 3    ALLERGIES:  Review of patient's allergies indicates:   Allergen Reactions    Pcn [penicillins] Hives       PSYCHIATRIC EXAM:  There were no vitals filed for this visit.  Appearance:  Well groomed, appearing healthy and of stated age  Behavior:  Cooperative, pleasant, no psychomotor agitation or retardation  Speech:  Normal rate, rhythm, prosody, and volume  Mood:  "Good"  Affect:  Flat (chronic)  Thought Process:  Linear, logical, goal directed  Thought Content:  Negative for suicidal ideation, homicidal ideation, delusions or hallucinations.  Associations:  Intact  Memory:  Grossly Intact  Level of Consciousness/Orientation:  Grossly intact  Fund of Knowledge:  Good  Attention:  Good  Language:  Fluent, able to name abstract and concrete objects  Insight:  Fair  Judgment:  Fair  Psychomotor signs:  No abnormal movements of face  Gait:  Unable to assess via virtual visit      RELEVANT LABS/STUDIES:    Lab Results   Component Value Date    WBC 7.67 06/02/2022    HGB 13.1 (L) 06/02/2022    HCT 40.5 06/02/2022    MCV 86 06/02/2022     06/02/2022     BMP  Lab Results   Component Value Date     06/05/2023    K 4.5 06/05/2023     06/05/2023    CO2 27 06/05/2023    BUN 12 06/05/2023    CREATININE 0.9 06/05/2023    CALCIUM 9.9 06/05/2023    ANIONGAP 8 06/05/2023    ESTGFRAFRICA >60.0 06/02/2022    EGFRNONAA >60.0 06/02/2022     Lab Results   Component Value Date    ALT 41 06/05/2023    AST 29 06/05/2023    ALKPHOS 91 06/05/2023    BILITOT 0.4 06/05/2023     Lab Results   Component Value Date    TSH 0.805 06/02/2022     Lab Results   Component Value Date    HGBA1C 5.2 06/05/2023       IMPRESSION:    Royal HALLEY Celaya III is a 23 y.o. male with history of Anxiety, ADHD, and Autism spectrum disorder who presents for follow up appointment.  "     Status/Progress:  Based on the examination today, the patient's problem(s) is/are adequately but not ideally controlled.  New problems have not been presented today.     Risk Parameters:  Patient reports no suicidal ideation  Patient reports no homicidal ideation  Patient reports no self-injurious behavior  Patient reports no violent behavior    DIAGNOSES:    ICD-10-CM ICD-9-CM   1. HARITHA (generalized anxiety disorder)  F41.1 300.02   2. Panic attacks  F41.0 300.01   3. Performance anxiety  F41.8 300.09   4. Attention deficit hyperactivity disorder (ADHD), combined type  F90.2 314.01   5. Autism spectrum disorder  F84.0 299.00         PLAN:  Discussed recommendation to increase Buspar to 5mg BID and Propranolol to 40mg BID.  Continue Pristiq 100mg daily.  Continue hydroxyzine 25mg PRN anxiety.  Could consider restarting Ativan 0.5mg PRN in the future prior to performances.    Discussed with patient informed consent, risks versus benefits, alternative treatments, side effect profile and the inherent unpredictability of individual responses to these treatments.  The patient expresses understanding of the above and displays the capacity to agree with this current plan.    RETURN TO CLINIC:  Follow up in about 2 months (around 8/7/2023). In person

## 2023-06-01 ENCOUNTER — OFFICE VISIT (OUTPATIENT)
Dept: OPTOMETRY | Facility: CLINIC | Age: 23
End: 2023-06-01
Payer: COMMERCIAL

## 2023-06-01 DIAGNOSIS — H52.13 MYOPIA OF BOTH EYES: Primary | ICD-10-CM

## 2023-06-01 PROCEDURE — 1159F PR MEDICATION LIST DOCUMENTED IN MEDICAL RECORD: ICD-10-PCS | Mod: CPTII,S$GLB,, | Performed by: OPTOMETRIST

## 2023-06-01 PROCEDURE — 99999 PR PBB SHADOW E&M-EST. PATIENT-LVL II: ICD-10-PCS | Mod: PBBFAC,,, | Performed by: OPTOMETRIST

## 2023-06-01 PROCEDURE — 92015 DETERMINE REFRACTIVE STATE: CPT | Mod: S$GLB,,, | Performed by: OPTOMETRIST

## 2023-06-01 PROCEDURE — 1160F RVW MEDS BY RX/DR IN RCRD: CPT | Mod: CPTII,S$GLB,, | Performed by: OPTOMETRIST

## 2023-06-01 PROCEDURE — 1159F MED LIST DOCD IN RCRD: CPT | Mod: CPTII,S$GLB,, | Performed by: OPTOMETRIST

## 2023-06-01 PROCEDURE — 92014 COMPRE OPH EXAM EST PT 1/>: CPT | Mod: S$GLB,,, | Performed by: OPTOMETRIST

## 2023-06-01 PROCEDURE — 1160F PR REVIEW ALL MEDS BY PRESCRIBER/CLIN PHARMACIST DOCUMENTED: ICD-10-PCS | Mod: CPTII,S$GLB,, | Performed by: OPTOMETRIST

## 2023-06-01 PROCEDURE — 92014 PR EYE EXAM, EST PATIENT,COMPREHESV: ICD-10-PCS | Mod: S$GLB,,, | Performed by: OPTOMETRIST

## 2023-06-01 PROCEDURE — 92015 PR REFRACTION: ICD-10-PCS | Mod: S$GLB,,, | Performed by: OPTOMETRIST

## 2023-06-01 PROCEDURE — 99999 PR PBB SHADOW E&M-EST. PATIENT-LVL II: CPT | Mod: PBBFAC,,, | Performed by: OPTOMETRIST

## 2023-06-01 NOTE — PROGRESS NOTES
HPI    Pt here for routine exam.  Reports no problems.  Last edited by Jason Burden, OD on 6/1/2023  2:48 PM.            Assessment /Plan     For exam results, see Encounter Report.    Myopia of both eyes      No Rx change--pt wishes new spex    PLAN:    Rtc 2 yrs--full exam (ins pays every 2 years)

## 2023-06-05 ENCOUNTER — OFFICE VISIT (OUTPATIENT)
Dept: INTERNAL MEDICINE | Facility: CLINIC | Age: 23
End: 2023-06-05
Attending: FAMILY MEDICINE
Payer: COMMERCIAL

## 2023-06-05 ENCOUNTER — LAB VISIT (OUTPATIENT)
Dept: LAB | Facility: HOSPITAL | Age: 23
End: 2023-06-05
Attending: FAMILY MEDICINE
Payer: COMMERCIAL

## 2023-06-05 VITALS
TEMPERATURE: 97 F | HEART RATE: 70 BPM | HEIGHT: 70 IN | BODY MASS INDEX: 39.08 KG/M2 | DIASTOLIC BLOOD PRESSURE: 80 MMHG | OXYGEN SATURATION: 97 % | SYSTOLIC BLOOD PRESSURE: 118 MMHG | WEIGHT: 273 LBS

## 2023-06-05 DIAGNOSIS — F41.1 GAD (GENERALIZED ANXIETY DISORDER): ICD-10-CM

## 2023-06-05 DIAGNOSIS — Z00.00 ANNUAL PHYSICAL EXAM: Primary | ICD-10-CM

## 2023-06-05 DIAGNOSIS — F90.2 ATTENTION DEFICIT HYPERACTIVITY DISORDER (ADHD), COMBINED TYPE: ICD-10-CM

## 2023-06-05 DIAGNOSIS — Z00.00 ANNUAL PHYSICAL EXAM: ICD-10-CM

## 2023-06-05 DIAGNOSIS — F84.0 AUTISM SPECTRUM DISORDER: ICD-10-CM

## 2023-06-05 DIAGNOSIS — K76.0 NAFLD (NONALCOHOLIC FATTY LIVER DISEASE): ICD-10-CM

## 2023-06-05 LAB
ALBUMIN SERPL BCP-MCNC: 4.3 G/DL (ref 3.5–5.2)
ALP SERPL-CCNC: 91 U/L (ref 55–135)
ALT SERPL W/O P-5'-P-CCNC: 41 U/L (ref 10–44)
ANION GAP SERPL CALC-SCNC: 8 MMOL/L (ref 8–16)
AST SERPL-CCNC: 29 U/L (ref 10–40)
BILIRUB SERPL-MCNC: 0.4 MG/DL (ref 0.1–1)
BUN SERPL-MCNC: 12 MG/DL (ref 6–20)
CALCIUM SERPL-MCNC: 9.9 MG/DL (ref 8.7–10.5)
CHLORIDE SERPL-SCNC: 105 MMOL/L (ref 95–110)
CHOLEST SERPL-MCNC: 209 MG/DL (ref 120–199)
CHOLEST/HDLC SERPL: 3.2 {RATIO} (ref 2–5)
CO2 SERPL-SCNC: 27 MMOL/L (ref 23–29)
CREAT SERPL-MCNC: 0.9 MG/DL (ref 0.5–1.4)
EST. GFR  (NO RACE VARIABLE): >60 ML/MIN/1.73 M^2
ESTIMATED AVG GLUCOSE: 103 MG/DL (ref 68–131)
GLUCOSE SERPL-MCNC: 91 MG/DL (ref 70–110)
HBA1C MFR BLD: 5.2 % (ref 4–5.6)
HDLC SERPL-MCNC: 66 MG/DL (ref 40–75)
HDLC SERPL: 31.6 % (ref 20–50)
LDLC SERPL CALC-MCNC: 115 MG/DL (ref 63–159)
NONHDLC SERPL-MCNC: 143 MG/DL
POTASSIUM SERPL-SCNC: 4.5 MMOL/L (ref 3.5–5.1)
PROT SERPL-MCNC: 7.9 G/DL (ref 6–8.4)
SODIUM SERPL-SCNC: 140 MMOL/L (ref 136–145)
TRIGL SERPL-MCNC: 140 MG/DL (ref 30–150)

## 2023-06-05 PROCEDURE — 1159F PR MEDICATION LIST DOCUMENTED IN MEDICAL RECORD: ICD-10-PCS | Mod: CPTII,S$GLB,, | Performed by: FAMILY MEDICINE

## 2023-06-05 PROCEDURE — 1160F PR REVIEW ALL MEDS BY PRESCRIBER/CLIN PHARMACIST DOCUMENTED: ICD-10-PCS | Mod: CPTII,S$GLB,, | Performed by: FAMILY MEDICINE

## 2023-06-05 PROCEDURE — 1159F MED LIST DOCD IN RCRD: CPT | Mod: CPTII,S$GLB,, | Performed by: FAMILY MEDICINE

## 2023-06-05 PROCEDURE — 83036 HEMOGLOBIN GLYCOSYLATED A1C: CPT | Performed by: FAMILY MEDICINE

## 2023-06-05 PROCEDURE — 3079F PR MOST RECENT DIASTOLIC BLOOD PRESSURE 80-89 MM HG: ICD-10-PCS | Mod: CPTII,S$GLB,, | Performed by: FAMILY MEDICINE

## 2023-06-05 PROCEDURE — 3079F DIAST BP 80-89 MM HG: CPT | Mod: CPTII,S$GLB,, | Performed by: FAMILY MEDICINE

## 2023-06-05 PROCEDURE — 3074F SYST BP LT 130 MM HG: CPT | Mod: CPTII,S$GLB,, | Performed by: FAMILY MEDICINE

## 2023-06-05 PROCEDURE — 99395 PR PREVENTIVE VISIT,EST,18-39: ICD-10-PCS | Mod: S$GLB,,, | Performed by: FAMILY MEDICINE

## 2023-06-05 PROCEDURE — 99999 PR PBB SHADOW E&M-EST. PATIENT-LVL V: ICD-10-PCS | Mod: PBBFAC,,, | Performed by: FAMILY MEDICINE

## 2023-06-05 PROCEDURE — 99999 PR PBB SHADOW E&M-EST. PATIENT-LVL V: CPT | Mod: PBBFAC,,, | Performed by: FAMILY MEDICINE

## 2023-06-05 PROCEDURE — 3008F PR BODY MASS INDEX (BMI) DOCUMENTED: ICD-10-PCS | Mod: CPTII,S$GLB,, | Performed by: FAMILY MEDICINE

## 2023-06-05 PROCEDURE — 80061 LIPID PANEL: CPT | Performed by: FAMILY MEDICINE

## 2023-06-05 PROCEDURE — 99395 PREV VISIT EST AGE 18-39: CPT | Mod: S$GLB,,, | Performed by: FAMILY MEDICINE

## 2023-06-05 PROCEDURE — 1160F RVW MEDS BY RX/DR IN RCRD: CPT | Mod: CPTII,S$GLB,, | Performed by: FAMILY MEDICINE

## 2023-06-05 PROCEDURE — 3008F BODY MASS INDEX DOCD: CPT | Mod: CPTII,S$GLB,, | Performed by: FAMILY MEDICINE

## 2023-06-05 PROCEDURE — 80053 COMPREHEN METABOLIC PANEL: CPT | Performed by: FAMILY MEDICINE

## 2023-06-05 PROCEDURE — 3074F PR MOST RECENT SYSTOLIC BLOOD PRESSURE < 130 MM HG: ICD-10-PCS | Mod: CPTII,S$GLB,, | Performed by: FAMILY MEDICINE

## 2023-06-05 PROCEDURE — 36415 COLL VENOUS BLD VENIPUNCTURE: CPT | Performed by: FAMILY MEDICINE

## 2023-06-05 NOTE — PATIENT INSTRUCTIONS
Schedule lab orders for today.      Information about cholesterol, high blood pressure and healthy diet and activity recommendations can be found at the following links on the Internet:    Cholesterol  http://www.nhlbi.nih.gov/health/health-topics/topics/hbc    Weight loss  http://www.nhlbi.nih.gov/health/educational/lose_wt/index.htm    High Blood Pressure  Http://www.nhlbi.nih.gov/files/docs/public/heart/hbp_low.pdf    Cardiovascular General  http://www.heart.org/HEARTORG/    Diabetes General  http://diabetes.org/    CDC  https://www.cdc.gov/    Healthfinder  Https://healthfinder.gov/    Dietary Guide - Comprehensive  https://health.gov/dietaryguidelines/2015/guidelines/    Physical Activity and Exercise  https://health.gov/paguidelines/second-edition/pdf/Physical_Activity_Guidelines_2nd_edition.pdf    Choosing Wisely  https://www.choosingwisely.org/patient-resources/

## 2023-06-05 NOTE — PROGRESS NOTES
Subjective:       Patient ID: Royal HALLEY Celaya III is a 23 y.o. male.    Chief Complaint: Annual Exam and Cyst (On jaw)    New patient to establish care and annual wellness check, physical exam; and also chronic disease management. Specific complaints - see dictation, M*model entries and please see ROS.  Past, Surgical, Family, Social Histories; Medications, Allergies reviewed and reconciled.  Health maintenance file reviewed and addressed items due. Recent applicable lab, imaging and cardiovascular results reviewed.  Problem list items reviewed and modified or added entries (in the overview section) may not be transcribed into this encounter note due to note writer format.    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  He was able to complete a recital.  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  Concerned about a lump to his left face.  He had a picture was slightly red at the time.  Seems to reduced in size.  Followed by Psychiatry for multiple diagnoses.  That said was able to finish school.  Discussed his medications, takes Inderal for anxiety and stage fright and also hydroxyzine from urgent care.      Review of Systems   Constitutional:  Negative for appetite change, chills, diaphoresis, fatigue and fever.   HENT:  Negative for congestion, postnasal drip, rhinorrhea, sore throat and trouble swallowing.    Eyes:  Negative for visual disturbance.   Respiratory:  Negative for cough, choking, chest tightness, shortness of breath and wheezing.    Cardiovascular:  Negative for chest pain and leg swelling.   Gastrointestinal:  Negative for abdominal distention, abdominal pain, diarrhea, nausea and vomiting.   Genitourinary:  Negative for difficulty urinating and hematuria.   Musculoskeletal:  Negative for arthralgias and myalgias.   Skin:  Negative for rash.   Neurological:  Negative for weakness, light-headedness and headaches.   Psychiatric/Behavioral:   Positive for decreased concentration. Negative for confusion and dysphoric mood. The patient is nervous/anxious.      Objective:      Physical Exam  Vitals and nursing note reviewed.   Constitutional:       Appearance: He is well-developed. He is obese. He is not diaphoretic.   Eyes:      General: No scleral icterus.  Neck:      Thyroid: No thyromegaly.      Vascular: No carotid bruit or JVD.      Trachea: No tracheal deviation.   Cardiovascular:      Rate and Rhythm: Normal rate and regular rhythm.      Heart sounds: Normal heart sounds. No murmur heard.    No friction rub. No gallop.   Pulmonary:      Effort: Pulmonary effort is normal. No respiratory distress.      Breath sounds: Normal breath sounds. No wheezing or rales.   Abdominal:      General: There is no distension.      Palpations: Abdomen is soft. There is no mass.      Tenderness: There is no abdominal tenderness. There is no guarding or rebound.   Musculoskeletal:      Cervical back: Normal range of motion and neck supple.   Lymphadenopathy:      Cervical: No cervical adenopathy.   Skin:     General: Skin is warm and dry.      Findings: No erythema or rash.          Neurological:      Mental Status: He is alert and oriented to person, place, and time.      Cranial Nerves: No cranial nerve deficit.      Motor: No tremor.      Coordination: Coordination normal.      Gait: Gait normal.   Psychiatric:         Behavior: Behavior normal.         Thought Content: Thought content normal.         Judgment: Judgment normal.       Assessment:       1. Annual physical exam    2. NAFLD (nonalcoholic fatty liver disease)    3. Attention deficit hyperactivity disorder (ADHD), combined type    4. Autism spectrum disorder    5. HARITHA (generalized anxiety disorder)        Plan:     Medication List with Changes/Refills   Current Medications    BUSPIRONE (BUSPAR) 5 MG TAB    Take 1 tablet (5 mg total) by mouth 2 (two) times daily.    DESVENLAFAXINE SUCCINATE (PRISTIQ) 100  MG TB24    Take 1 tablet (100 mg total) by mouth once daily.    HYDROXYZINE HCL (ATARAX) 25 MG TABLET    Take 1 tablet (25 mg total) by mouth 2 (two) times daily as needed for Anxiety.    PROPRANOLOL (INDERAL) 40 MG TABLET    Take 1 tablet (40 mg total) by mouth 2 (two) times daily.     1. Annual physical exam  -     Comprehensive Metabolic Panel; Standing  -     Lipid Panel; Standing  -     Hemoglobin A1C; Future; Expected date: 06/05/2023    2. NAFLD (nonalcoholic fatty liver disease)  Overview:  -followed in hepatology    Orders:  -     Ambulatory referral/consult to Hepatology; Future; Expected date: 06/12/2023    3. Attention deficit hyperactivity disorder (ADHD), combined type    4. Autism spectrum disorder    5. HARITHA (generalized anxiety disorder)      See meds, orders, follow up, routing and instructions sections of encounter and AVS. Discussed with patient and provided on AVS.    Discussed diet and exercise as therapeutic modalities for metabolic and other conditions. Provided patient information, which are included as links on the AVS for detailed information.    Lab Results   Component Value Date     06/02/2022    K 4.4 06/02/2022     06/02/2022    BUN 16 06/02/2022    CREATININE 0.8 06/02/2022    GLU 84 06/02/2022    HGBA1C 5.2 04/27/2021    AST 22 06/02/2022    ALT 28 06/02/2022    ALBUMIN 4.2 06/02/2022    PROT 7.3 06/02/2022    BILITOT 0.3 06/02/2022    CHOL 159 06/02/2022    HDL 54 06/02/2022    LDLCALC 85.2 06/02/2022    TRIG 99 06/02/2022    WBC 7.67 06/02/2022    HGB 13.1 (L) 06/02/2022    HCT 40.5 06/02/2022     06/02/2022    TSH 0.805 06/02/2022         Will

## 2023-07-24 ENCOUNTER — OFFICE VISIT (OUTPATIENT)
Dept: DERMATOLOGY | Facility: CLINIC | Age: 23
End: 2023-07-24
Payer: COMMERCIAL

## 2023-07-24 DIAGNOSIS — D18.01 ANGIOMA OF SKIN: ICD-10-CM

## 2023-07-24 DIAGNOSIS — Z12.83 SCREENING EXAM FOR SKIN CANCER: ICD-10-CM

## 2023-07-24 DIAGNOSIS — D22.9 MULTIPLE BENIGN NEVI: Primary | ICD-10-CM

## 2023-07-24 PROCEDURE — 1159F MED LIST DOCD IN RCRD: CPT | Mod: CPTII,S$GLB,, | Performed by: DERMATOLOGY

## 2023-07-24 PROCEDURE — 99999 PR PBB SHADOW E&M-EST. PATIENT-LVL III: CPT | Mod: PBBFAC,,, | Performed by: DERMATOLOGY

## 2023-07-24 PROCEDURE — 99213 OFFICE O/P EST LOW 20 MIN: CPT | Mod: S$GLB,,, | Performed by: DERMATOLOGY

## 2023-07-24 PROCEDURE — 1160F PR REVIEW ALL MEDS BY PRESCRIBER/CLIN PHARMACIST DOCUMENTED: ICD-10-PCS | Mod: CPTII,S$GLB,, | Performed by: DERMATOLOGY

## 2023-07-24 PROCEDURE — 1159F PR MEDICATION LIST DOCUMENTED IN MEDICAL RECORD: ICD-10-PCS | Mod: CPTII,S$GLB,, | Performed by: DERMATOLOGY

## 2023-07-24 PROCEDURE — 3044F PR MOST RECENT HEMOGLOBIN A1C LEVEL <7.0%: ICD-10-PCS | Mod: CPTII,S$GLB,, | Performed by: DERMATOLOGY

## 2023-07-24 PROCEDURE — 3044F HG A1C LEVEL LT 7.0%: CPT | Mod: CPTII,S$GLB,, | Performed by: DERMATOLOGY

## 2023-07-24 PROCEDURE — 1160F RVW MEDS BY RX/DR IN RCRD: CPT | Mod: CPTII,S$GLB,, | Performed by: DERMATOLOGY

## 2023-07-24 PROCEDURE — 99213 PR OFFICE/OUTPT VISIT, EST, LEVL III, 20-29 MIN: ICD-10-PCS | Mod: S$GLB,,, | Performed by: DERMATOLOGY

## 2023-07-24 PROCEDURE — 99999 PR PBB SHADOW E&M-EST. PATIENT-LVL III: ICD-10-PCS | Mod: PBBFAC,,, | Performed by: DERMATOLOGY

## 2023-07-24 NOTE — PROGRESS NOTES
Subjective:      Patient ID:  Royal HALLEY Celaya III is a 23 y.o. male who presents for   Chief Complaint   Patient presents with    Skin Check     TBSE     Patient here for Total Body Skin Exam    Last seen by dermatologist: 1 yr ago - dr. [facility]    yes - personal history of atypical moles removed  no - personal history of MM   No - maternal grandfather - family history of MM  no - childhood blistering sunburns  no - tanning bed use  no - personal history of NMSC      No new concerning moles or lesions          Review of Systems   Skin:  Negative for daily sunscreen use, activity-related sunscreen use, recent sunburn and wears hat.   Hematologic/Lymphatic: Does not bruise/bleed easily.     Objective:   Physical Exam   Constitutional: He appears well-developed and well-nourished. No distress.   Neurological: He is alert and oriented to person, place, and time. He is not disoriented.   Psychiatric: He has a normal mood and affect.   Skin:   Areas Examined (abnormalities noted in diagram):   Scalp / Hair Palpated and Inspected  Head / Face Inspection Performed  Neck Inspection Performed  Chest / Axilla Inspection Performed  Abdomen Inspection Performed  Back Inspection Performed  RUE Inspected  LUE Inspection Performed  RLE Inspected  LLE Inspection Performed  Nails and Digits Inspection Performed               Diagram Legend     Erythematous scaling macule/papule c/w actinic keratosis       Vascular papule c/w angioma      Pigmented verrucoid papule/plaque c/w seborrheic keratosis      Yellow umbilicated papule c/w sebaceous hyperplasia      Irregularly shaped tan macule c/w lentigo     1-2 mm smooth white papules consistent with Milia      Movable subcutaneous cyst with punctum c/w epidermal inclusion cyst      Subcutaneous movable cyst c/w pilar cyst      Firm pink to brown papule c/w dermatofibroma      Pedunculated fleshy papule(s) c/w skin tag(s)      Evenly pigmented macule c/w junctional nevus     Mildly  variegated pigmented, slightly irregular-bordered macule c/w mildly atypical nevus      Flesh colored to evenly pigmented papule c/w intradermal nevus       Pink pearly papule/plaque c/w basal cell carcinoma      Erythematous hyperkeratotic cursted plaque c/w SCC      Surgical scar with no sign of skin cancer recurrence      Open and closed comedones      Inflammatory papules and pustules      Verrucoid papule consistent consistent with wart     Erythematous eczematous patches and plaques     Dystrophic onycholytic nail with subungual debris c/w onychomycosis     Umbilicated papule    Erythematous-base heme-crusted tan verrucoid plaque consistent with inflamed seborrheic keratosis     Erythematous Silvery Scaling Plaque c/w Psoriasis     See annotation            Assessment / Plan:        Multiple benign nevi  total body skin examination performed today including at least 12 points as noted in physical examination. No lesions suspicious for malignancy noted.  Reassurance provided.    Instructed patient to observe lesion(s) for changes and follow up in clinic if changes are noted. Patient to monitor skin at home for new or changing lesions and follow up in clinic if noted.    Discussed ABCDE's of moles and brochure provided.    Patient with 1 mildly atypical nevi (mid upper back - pic above). Instructed patient to observe lesion(s) for changes and follow up in clinic if changes are noted. Discussed ABCDE's of moles and brochure provided.    Record release for path reports signed    Angioma of skin  This is a benign vascular lesion. Reassurance given. No treatment required.       Screening exam for skin cancer  Total body skin examination performed today including at least 12 points as noted in physical examination. No lesions suspicious for malignancy noted.    Recommend daily sun protection/avoidance, use of at least SPF 30, broad spectrum sunscreen (OTC drug), skin self examinations, and routine physician  surveillance to optimize early detection               No follow-ups on file.

## 2023-07-27 ENCOUNTER — TELEPHONE (OUTPATIENT)
Dept: HEPATOLOGY | Facility: CLINIC | Age: 23
End: 2023-07-27
Payer: COMMERCIAL

## 2023-07-27 NOTE — TELEPHONE ENCOUNTER
----- Message from Giuliana Mosquera sent at 7/27/2023  2:53 PM CDT -----  Regarding: Scheduling Request  Pt needs to reschedule his appt. He will be in [place] on 08/22 and won't return until December.      Ms. Velazquez (Mother) @ [phone]

## 2023-07-27 NOTE — TELEPHONE ENCOUNTER
Patient's mother was called.  She wanted an earlier appointment than August 24, 2023.      No availability, parent was advise.  Patient's mother advise to leave the date as is, she will decide at a later time to reschedule.

## 2023-08-01 ENCOUNTER — TELEPHONE (OUTPATIENT)
Dept: INTERNAL MEDICINE | Facility: CLINIC | Age: 23
End: 2023-08-01
Payer: COMMERCIAL

## 2023-08-01 ENCOUNTER — PATIENT MESSAGE (OUTPATIENT)
Dept: INTERNAL MEDICINE | Facility: CLINIC | Age: 23
End: 2023-08-01
Payer: COMMERCIAL

## 2023-08-01 NOTE — TELEPHONE ENCOUNTER
I have not received any forms.  I did print and immunization report that you can give to the patient.  Please contact patient and have them pick it up and see if that will do, thank you

## 2023-08-07 ENCOUNTER — OFFICE VISIT (OUTPATIENT)
Dept: PSYCHIATRY | Facility: CLINIC | Age: 23
End: 2023-08-07
Payer: COMMERCIAL

## 2023-08-07 VITALS
DIASTOLIC BLOOD PRESSURE: 57 MMHG | SYSTOLIC BLOOD PRESSURE: 132 MMHG | HEART RATE: 80 BPM | WEIGHT: 276.69 LBS | BODY MASS INDEX: 39.7 KG/M2

## 2023-08-07 DIAGNOSIS — F84.0 AUTISM SPECTRUM DISORDER: ICD-10-CM

## 2023-08-07 DIAGNOSIS — F90.2 ATTENTION DEFICIT HYPERACTIVITY DISORDER (ADHD), COMBINED TYPE: ICD-10-CM

## 2023-08-07 DIAGNOSIS — F40.10 SOCIAL ANXIETY DISORDER: Primary | ICD-10-CM

## 2023-08-07 DIAGNOSIS — F41.0 PANIC ATTACKS: ICD-10-CM

## 2023-08-07 PROCEDURE — 99999 PR PBB SHADOW E&M-EST. PATIENT-LVL III: CPT | Mod: PBBFAC,,, | Performed by: INTERNAL MEDICINE

## 2023-08-07 PROCEDURE — 99999 PR PBB SHADOW E&M-EST. PATIENT-LVL III: ICD-10-PCS | Mod: PBBFAC,,, | Performed by: INTERNAL MEDICINE

## 2023-08-07 PROCEDURE — 99214 PR OFFICE/OUTPT VISIT, EST, LEVL IV, 30-39 MIN: ICD-10-PCS | Mod: S$GLB,,, | Performed by: INTERNAL MEDICINE

## 2023-08-07 PROCEDURE — 90833 PR PSYCHOTHERAPY W/PATIENT W/E&M, 30 MIN (ADD ON): ICD-10-PCS | Mod: S$GLB,,, | Performed by: INTERNAL MEDICINE

## 2023-08-07 PROCEDURE — 90833 PSYTX W PT W E/M 30 MIN: CPT | Mod: S$GLB,,, | Performed by: INTERNAL MEDICINE

## 2023-08-07 PROCEDURE — 99214 OFFICE O/P EST MOD 30 MIN: CPT | Mod: S$GLB,,, | Performed by: INTERNAL MEDICINE

## 2023-08-07 RX ORDER — LORAZEPAM 0.5 MG/1
0.5 TABLET ORAL 2 TIMES DAILY PRN
Qty: 60 TABLET | Refills: 0 | Status: SHIPPED | OUTPATIENT
Start: 2023-08-07 | End: 2023-09-06

## 2023-08-07 NOTE — PROGRESS NOTES
OUTPATIENT PSYCHIATRY RETURN VISIT    ENCOUNTER DATE:  8/8/2023  SITE:  [facility]  LENGTH OF SESSION:  35 minutes    CHIEF COMPLAINT:  Follow-up      HISTORY OF PRESENTING ILLNESS:  Royal HALLEY Celaya III is a 23 y.o. male with history of Anxiety, ADHD, and Autism spectrum disorder who presents for follow up appointment.  He presents today accompanied by his mother.    Plan at last appointment on 5/25/2023:  Discussed recommendation to increase Buspar to 5mg BID and Propranolol to 40mg BID.  Continue Pristiq 100mg daily.  Continue hydroxyzine 25mg PRN anxiety.  Could consider restarting Ativan 0.5mg PRN in the future prior to performances.    Discussed with patient informed consent, risks versus benefits, alternative treatments, side effect profile and the inherent unpredictability of individual responses to these treatments.  The patient expresses understanding of the above and displays the capacity to agree with this current plan.    History as told by patient and mother:  Planning to be in [place] in the fall but still waiting on housing.  Last sent in paperwork in 2016 for disability diagnosis - noiw needs to be redone.  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  Program starts in 2 weeks, class starts in 3 weeks.  Had severe anxiety prior to last performance - hydroxyzine helped.  But he has not used it since then.  Still just taking medications in the morning.  End of the year did start taking the BID for awhile when had anxiety about something on his skin.  He denies feeling depressed.  Sleeps well.  Mother is worried about gradual weight gain over the years.  He does not want to discuss today.  Mother also wondering about some sort of social support to connect him to other people with autism.  He does not want to discuss this today either.      Psychotherapy:  Target symptoms: anxiety , autism  Why chosen therapy is appropriate versus another modality:  relevant to diagnosis, patient responds to this modality  Outcome monitoring methods: self-report, observation  Therapeutic intervention type: supportive psychotherapy  Topics discussed/themes: building skills sets for symptom management, symptom recognition  The patient's response to the intervention is accepting. The patient's progress toward treatment goals is fair.   Duration of intervention: 20 minutes.    Medication side effects:  No  Medication compliance:  Yes    PSYCHIATRIC REVIEW OF SYSTEMS:  Trouble with sleep:  Denies  Appetite changes:  Denies  Weight changes:  Denies  Lack of energy:  Denies  Anhedonia:  Denies  Somatic symptoms:  Denies  Libido:  Not discussed  Anxiety/panic:  Sometimes, not currently  Guilty/hopeless:  Denies  Self-injurious behavior/risky behavior:  Denies  Any drugs:  Denies  Alcohol:  Occasional    MEDICAL REVIEW OF SYSTEMS:  Complete review of systems performed covering Constitutional, Musculoskeletal, Neurologic.  All systems negative except for that covered in HPI.    PAST PSYCHIATRIC, MEDICAL, AND SOCIAL HISTORY REVIEWED  The patient's past medical, family and social history have been reviewed and updated as appropriate within the electronic medical record - see encounter notes.    MEDICATIONS:    Current Outpatient Medications:     busPIRone (BUSPAR) 5 MG Tab, Take 1 tablet (5 mg total) by mouth 2 (two) times daily., Disp: 180 tablet, Rfl: 3    desvenlafaxine succinate (PRISTIQ) 100 MG Tb24, Take 1 tablet (100 mg total) by mouth once daily., Disp: 90 tablet, Rfl: 3    hydrOXYzine HCL (ATARAX) 25 MG tablet, Take 1 tablet (25 mg total) by mouth 2 (two) times daily as needed for Anxiety., Disp: 60 tablet, Rfl: 2    LORazepam (ATIVAN) 0.5 MG tablet, Take 1 tablet (0.5 mg total) by mouth 2 (two) times daily as needed (Severe anxiety/Panic)., Disp: 60 tablet, Rfl: 0    propranoloL (INDERAL) 40 MG tablet, Take 1 tablet (40 mg total) by mouth 2 (two) times daily., Disp: 180 tablet,  "Rfl: 3    ALLERGIES:  Review of patient's allergies indicates:   Allergen Reactions    Pcn [penicillins] Hives       PSYCHIATRIC EXAM:  Vitals:    08/07/23 1001   BP: (!) 132/57   Pulse: 80   Weight: 125.5 kg (276 lb 10.8 oz)     Appearance:  Well groomed, appearing healthy and of stated age  Behavior:  Cooperative, pleasant, no psychomotor agitation or retardation, good eye contact  Speech:  Normal rate, rhythm, prosody, and volume  Mood:  "Good"  Affect:  Flat (chronic)  Thought Process:  Linear, logical, goal directed  Thought Content:  Negative for suicidal ideation, homicidal ideation, delusions or hallucinations.  Associations:  Intact  Memory:  Grossly Intact  Level of Consciousness/Orientation:  Grossly intact  Fund of Knowledge:  Good  Attention:  Good  Language:  Fluent, able to name abstract and concrete objects  Insight:  Fair  Judgment:  Fair  Psychomotor signs:  No involuntary movements of tremor  Gait:  Normal      RELEVANT LABS/STUDIES:    Lab Results   Component Value Date    WBC 7.67 06/02/2022    HGB 13.1 (L) 06/02/2022    HCT 40.5 06/02/2022    MCV 86 06/02/2022     06/02/2022     BMP  Lab Results   Component Value Date     06/05/2023    K 4.5 06/05/2023     06/05/2023    CO2 27 06/05/2023    BUN 12 06/05/2023    CREATININE 0.9 06/05/2023    CALCIUM 9.9 06/05/2023    ANIONGAP 8 06/05/2023    ESTGFRAFRICA >60.0 06/02/2022    EGFRNONAA >60.0 06/02/2022     Lab Results   Component Value Date    ALT 41 06/05/2023    AST 29 06/05/2023    ALKPHOS 91 06/05/2023    BILITOT 0.4 06/05/2023     Lab Results   Component Value Date    TSH 0.805 06/02/2022     Lab Results   Component Value Date    HGBA1C 5.2 06/05/2023       IMPRESSION:    Royal HALLEY Celaya III is a 23 y.o. male with history of Anxiety, ADHD, and Autism spectrum disorder who presents for follow up appointment.      Status/Progress:  Based on the examination today, the patient's problem(s) is/are adequately but not ideally " controlled.  New problems have not been presented today.     Risk Parameters:  Patient reports no suicidal ideation  Patient reports no homicidal ideation  Patient reports no self-injurious behavior  Patient reports no violent behavior    DIAGNOSES:    ICD-10-CM ICD-9-CM   1. Social anxiety disorder  F40.10 300.23   2. Panic attacks  F41.0 300.01   3. Attention deficit hyperactivity disorder (ADHD), combined type  F90.2 314.01   4. Autism spectrum disorder  F84.0 299.00       PLAN:  Continue Pristiq 100mg daily, Buspar 5mg BID, and Propranolol to 40mg BID.  He often takes Buspar and Propranolol just once daily when he is not feeling anxious.    Continue hydroxyzine 25mg PRN anxiety.    Restart Ativan 0.5mg PRN panic attack.  Discussed with patient informed consent, risks versus benefits, alternative treatments, side effect profile and the inherent unpredictability of individual responses to these treatments.  The patient expresses understanding of the above and displays the capacity to agree with this current plan.    RETURN TO CLINIC:  Follow up in about 1 week (around 8/14/2023). Virtual appointment without mother.

## 2023-08-07 NOTE — LETTER
"     August 7, 2023      [facility] 4th Fl  [address]  [facility] [address]  Phone: [phone]  Fax: [phone]       Patient: Royal Magaly Celaya   YOB: 2000  Date of Visit: 08/07/2023    To Whom It May Concern:    I have been treating "Preethi Celaya at [facility] since 5/17/2021.  His current diagnoses include Autism Spectrum Disorder, Social Anxiety Disorder, and Attention Deficit Disorder.  These diagnoses were made 10/17/2016.  Autism Spectrum Disorder causes difficulty reading social cues of others, problems understanding the perspective of others, and inflexibility with routines.  These symptoms often worsen social anxiety.  ADHD causes difficulty sustaining attention and completing tasks.  These symptoms are chronic in nature are not expected to change.  He is prescribed Pristiq 100mg daily, Buspar 5mg twice daily, and Propranolol 40mg twice daily.  He was last evaluated today, 8/7/23.    Regarding Leanna's living situation, I would recommend private quarters without a roommate.  In the past when he had a roommate, it was very difficult for him to negotiate and accommodate living with someone who did not have the same routines and schedule as him.  Regarding academics, I would recommend he have a private, quiet room as to minimize distractions.  I would also recommend he have 1.5x time on all testing and projects.      If you have any questions or concerns, or if I can be of further assistance, please do not hesitate to contact me.    Sincerely,    Jazmine Moseley MD     "

## 2023-08-11 ENCOUNTER — TELEPHONE (OUTPATIENT)
Dept: HEPATOLOGY | Facility: CLINIC | Age: 23
End: 2023-08-11

## 2023-08-11 ENCOUNTER — OFFICE VISIT (OUTPATIENT)
Dept: HEPATOLOGY | Facility: CLINIC | Age: 23
End: 2023-08-11
Payer: COMMERCIAL

## 2023-08-11 VITALS
HEART RATE: 82 BPM | SYSTOLIC BLOOD PRESSURE: 124 MMHG | WEIGHT: 281.06 LBS | TEMPERATURE: 99 F | BODY MASS INDEX: 40.24 KG/M2 | HEIGHT: 70 IN | DIASTOLIC BLOOD PRESSURE: 75 MMHG | OXYGEN SATURATION: 97 %

## 2023-08-11 DIAGNOSIS — K76.0 NAFLD (NONALCOHOLIC FATTY LIVER DISEASE): Primary | ICD-10-CM

## 2023-08-11 DIAGNOSIS — E88.01 LOW PLASMA ALPHA-1 ANTITRYPSIN: ICD-10-CM

## 2023-08-11 DIAGNOSIS — E66.01 SEVERE OBESITY (BMI >= 40): ICD-10-CM

## 2023-08-11 PROCEDURE — 99999 PR PBB SHADOW E&M-EST. PATIENT-LVL IV: CPT | Mod: PBBFAC,,, | Performed by: NURSE PRACTITIONER

## 2023-08-11 PROCEDURE — 3044F HG A1C LEVEL LT 7.0%: CPT | Mod: CPTII,S$GLB,, | Performed by: NURSE PRACTITIONER

## 2023-08-11 PROCEDURE — 99214 PR OFFICE/OUTPT VISIT, EST, LEVL IV, 30-39 MIN: ICD-10-PCS | Mod: S$GLB,,, | Performed by: NURSE PRACTITIONER

## 2023-08-11 PROCEDURE — 1159F PR MEDICATION LIST DOCUMENTED IN MEDICAL RECORD: ICD-10-PCS | Mod: CPTII,S$GLB,, | Performed by: NURSE PRACTITIONER

## 2023-08-11 PROCEDURE — 3074F SYST BP LT 130 MM HG: CPT | Mod: CPTII,S$GLB,, | Performed by: NURSE PRACTITIONER

## 2023-08-11 PROCEDURE — 99999 PR PBB SHADOW E&M-EST. PATIENT-LVL IV: ICD-10-PCS | Mod: PBBFAC,,, | Performed by: NURSE PRACTITIONER

## 2023-08-11 PROCEDURE — 3074F PR MOST RECENT SYSTOLIC BLOOD PRESSURE < 130 MM HG: ICD-10-PCS | Mod: CPTII,S$GLB,, | Performed by: NURSE PRACTITIONER

## 2023-08-11 PROCEDURE — 3008F BODY MASS INDEX DOCD: CPT | Mod: CPTII,S$GLB,, | Performed by: NURSE PRACTITIONER

## 2023-08-11 PROCEDURE — 99214 OFFICE O/P EST MOD 30 MIN: CPT | Mod: S$GLB,,, | Performed by: NURSE PRACTITIONER

## 2023-08-11 PROCEDURE — 3008F PR BODY MASS INDEX (BMI) DOCUMENTED: ICD-10-PCS | Mod: CPTII,S$GLB,, | Performed by: NURSE PRACTITIONER

## 2023-08-11 PROCEDURE — 3044F PR MOST RECENT HEMOGLOBIN A1C LEVEL <7.0%: ICD-10-PCS | Mod: CPTII,S$GLB,, | Performed by: NURSE PRACTITIONER

## 2023-08-11 PROCEDURE — 3078F DIAST BP <80 MM HG: CPT | Mod: CPTII,S$GLB,, | Performed by: NURSE PRACTITIONER

## 2023-08-11 PROCEDURE — 1159F MED LIST DOCD IN RCRD: CPT | Mod: CPTII,S$GLB,, | Performed by: NURSE PRACTITIONER

## 2023-08-11 PROCEDURE — 3078F PR MOST RECENT DIASTOLIC BLOOD PRESSURE < 80 MM HG: ICD-10-PCS | Mod: CPTII,S$GLB,, | Performed by: NURSE PRACTITIONER

## 2023-08-11 NOTE — PROGRESS NOTES
[facility] - Established Patient    Last Clinic Visit: 8/22/22    Chief Complaint: Follow-up for fatty liver       HISTORY     This is a 23 y.o. male with PMH noted below, here for follow-up of fatty liver. Mother present for visit.     Initial referral for elevated ALT, 50-60.    Serological workup remarkable for A1AT phenotype MZ and level noted to be low, 72.    His US in 2019 showed hepatomegaly and Fibroscan with significant steatosis.      Fibrosis staging:   Fibroscan 5/2019 = F0-F1 (kPa 5.4), S3  Fibroscan 8/2021 = F2 (kPa 8.1), S3  Fibroscan 12/2022 = F0-F1 (kPa 4.5), S3    Interval history:  Feels well, no concerns from liver standpoint.     Graduated from [employer] school in [place]. May be moving back soon though not sure of next steps.     Liver enzymes remain normal though trending up, ALT 41.    Updates on risk factors for fatty liver:  Weight -- Body mass index is 40.33 kg/m².  Weight is up almost 20 lb. Has not been watching diet as strictly or exercising.  Dyslipidemia -- mildly elevated                              Insulin resistance / diabetes -- no, HgbA1c 5.2         Alcohol use -- rare    Health Maintenance:  -- Last imaging: abd US 8/18/22 without focal hepatic lesion  -- Hepatitis A & B vaccination: complete          Past medical history, surgical history, problem list, family history, social history, allergies: Reviewed and updated in the appropriate section of the electronic medical record.      Current Outpatient Medications   Medication Sig Dispense Refill    busPIRone (BUSPAR) 5 MG Tab Take 1 tablet (5 mg total) by mouth 2 (two) times daily. 180 tablet 3    desvenlafaxine succinate (PRISTIQ) 100 MG Tb24 Take 1 tablet (100 mg total) by mouth once daily. 90 tablet 3    propranoloL (INDERAL) 40 MG tablet Take 1 tablet (40 mg total) by mouth 2 (two) times daily. 180 tablet 3    hydrOXYzine HCL (ATARAX) 25 MG tablet Take 1 tablet (25 mg total) by mouth 2 (two) times daily as  needed for Anxiety. (Patient not taking: Reported on 8/11/2023) 60 tablet 2    LORazepam (ATIVAN) 0.5 MG tablet Take 1 tablet (0.5 mg total) by mouth 2 (two) times daily as needed (Severe anxiety/Panic). (Patient not taking: Reported on 8/11/2023) 60 tablet 0     No current facility-administered medications for this visit.     Medication list reviewed and updated.      Review of Systems - as per HPI  Constitutional: Negative for fatigue or unexpected weight change.   Respiratory: Negative for shortness of breath.    Cardiovascular: Negative for leg swelling.  Gastrointestinal: Negative for abdominal distention or abdominal pain. Negative for melena or hematemesis.  Musculoskeletal: Negative for myalgias.    Skin: Negative for jaundice or itching.  Neurological: Negative for confusion or slowed mentation. Negative for tremors.   Hematological: Does not bruise/bleed easily.   Psychiatric: Negative for sleep disturbance.      Physical Exam   Constitutional: Well-nourished. No distress. Alert and oriented.  Eyes: No scleral icterus.   Pulmonary/Chest: Respiratory effort normal. No respiratory distress.   Abdominal: No distension, no ascites appreciated.   Extremities: No edema.   Neurological: No tremor or asterixis. Gait normal.  Skin: No jaundice. No spider telangiectasias or palmar erythema.  Psychiatric: Withdrawn affect. Speech, behavior, and thought content normal. No depression or anxiety noted.         LABS & DIAGNOSTIC STUDIES     I have personally reviewed pertinent laboratory findings:    Lab Results   Component Value Date    ALT 41 06/05/2023    AST 29 06/05/2023    ALKPHOS 91 06/05/2023    BILITOT 0.4 06/05/2023    ALBUMIN 4.3 06/05/2023       Lab Results   Component Value Date    WBC 7.67 06/02/2022    HGB 13.1 (L) 06/02/2022    HCT 40.5 06/02/2022    MCV 86 06/02/2022     06/02/2022       Lab Results   Component Value Date     06/05/2023    K 4.5 06/05/2023    BUN 12 06/05/2023    CREATININE  "0.9 06/05/2023    ESTGFRAFRICA >60.0 06/02/2022    EGFRNONAA >60.0 06/02/2022       Lab Results   Component Value Date    SMOOTHMUSCAB Negative 1:40 05/16/2019    AMAIFA Negative 1:40 05/16/2019    IGGSERUM 938 05/16/2019    ANASCREEN Negative <1:160 05/16/2019    FERRITIN 58 04/27/2021    FESATURATED 12 (L) 04/27/2021    HASVR2JPNHOM MZ 05/16/2019    CWUPQ8JCAZYN 72 (L) 05/16/2019    CERULOPLSM 30.0 05/16/2019    HEPBSAG Negative 05/16/2019    HEPCAB Negative 05/16/2019    QVS25ZFJY Negative 04/27/2021       No results found for: "AFP"    I have personally reviewed the following result reports:  Abdominal US - 8/18/22      ASSESSMENT & PLAN     23 y.o. male with:    1. NAFLD   -- Liver enzymes have previously normalized with weight loss. ALT currently trending up with weight gain, 41. Cholesterol is also more increased.   -- Recommend labs to monitor liver enzymes/LFTs 1-2 times per year, can include labs with PCP  -- Last Fibroscan with no-minimal fibrosis (F0-F1), repeat in 1 year + US    Reminded that the only treatment for fatty liver is weight loss, maintaining good control of metabolic risk factors (cholesterol and blood sugar), and moderating alcohol use.      2. Body mass index is 40.33 kg/m².,   -- Reviewed weight loss strategies including dietary changes. Recommend low carbohydrate/sugar, high protein/fiber diet. Recommend long-term weight loss goal of 10% (about 28 lb).   -- We discussed additional weight loss resources including dietician consult, [facility] program, and bariatric medicine consult.     3. A1AT phenotype MZ, low A1AT level  -- No respiratory symptoms though previously recommend consult w/ Pulmonology for baseline PFTs      Orders Placed This Encounter   Procedures    FibroScan [facility] (Vibration Controlled Transient Elastography)    US Abdomen Limited       *See AVS for patient education and instructions.      Return to clinic in 1 year with Fibroscan and US.      Thank " you for allowing me to participate in the care of Royal HALLEY Garner, FNP-C  Hepatology        Duration of encounter: 30 min  This includes face to face time and non-face to face time preparing to see the patient (eg, review of tests), obtaining and/or reviewing separately obtained history, documenting clinical information in the electronic or other health record, independently interpreting results and communicating results to the patient/family/caregiver, or care coordination.

## 2023-08-11 NOTE — TELEPHONE ENCOUNTER
Follow up and US in recall for 1 year    Debra  ----- Message from Justa Garner NP sent at 8/11/2023  4:01 PM CDT -----  Please enter recall for f/u visit in 1 year with US and Fibroscan.

## 2023-08-15 ENCOUNTER — OFFICE VISIT (OUTPATIENT)
Dept: PSYCHIATRY | Facility: CLINIC | Age: 23
End: 2023-08-15
Payer: COMMERCIAL

## 2023-08-15 DIAGNOSIS — F41.0 PANIC ATTACKS: ICD-10-CM

## 2023-08-15 DIAGNOSIS — F84.0 AUTISM SPECTRUM DISORDER: Primary | ICD-10-CM

## 2023-08-15 DIAGNOSIS — F90.2 ATTENTION DEFICIT HYPERACTIVITY DISORDER (ADHD), COMBINED TYPE: ICD-10-CM

## 2023-08-15 DIAGNOSIS — F40.10 SOCIAL ANXIETY DISORDER: ICD-10-CM

## 2023-08-15 PROCEDURE — 99214 OFFICE O/P EST MOD 30 MIN: CPT | Mod: 95,,, | Performed by: INTERNAL MEDICINE

## 2023-08-15 PROCEDURE — 1160F RVW MEDS BY RX/DR IN RCRD: CPT | Mod: CPTII,95,, | Performed by: INTERNAL MEDICINE

## 2023-08-15 PROCEDURE — 3044F HG A1C LEVEL LT 7.0%: CPT | Mod: CPTII,95,, | Performed by: INTERNAL MEDICINE

## 2023-08-15 PROCEDURE — 3044F PR MOST RECENT HEMOGLOBIN A1C LEVEL <7.0%: ICD-10-PCS | Mod: CPTII,95,, | Performed by: INTERNAL MEDICINE

## 2023-08-15 PROCEDURE — 1160F PR REVIEW ALL MEDS BY PRESCRIBER/CLIN PHARMACIST DOCUMENTED: ICD-10-PCS | Mod: CPTII,95,, | Performed by: INTERNAL MEDICINE

## 2023-08-15 PROCEDURE — 99214 PR OFFICE/OUTPT VISIT, EST, LEVL IV, 30-39 MIN: ICD-10-PCS | Mod: 95,,, | Performed by: INTERNAL MEDICINE

## 2023-08-15 PROCEDURE — 1159F MED LIST DOCD IN RCRD: CPT | Mod: CPTII,95,, | Performed by: INTERNAL MEDICINE

## 2023-08-15 PROCEDURE — 1159F PR MEDICATION LIST DOCUMENTED IN MEDICAL RECORD: ICD-10-PCS | Mod: CPTII,95,, | Performed by: INTERNAL MEDICINE

## 2023-08-15 NOTE — PROGRESS NOTES
OUTPATIENT PSYCHIATRY RETURN VISIT    ENCOUNTER DATE:  8/24/2023  SITE:  [facility]  LENGTH OF SESSION:  15 minutes    The patient location is:  [place], not in a healthcare facility  Visit type:  audiovisual    Face to Face time with patient:  15 minutes  25 minutes of total time spent on the encounter, which includes face to face time and non-face to face time preparing to see the patient (eg, review of tests), Obtaining and/or reviewing separately obtained history, Documenting clinical information in the electronic or other health record, Independently interpreting results (not separately reported) and communicating results to the patient/family/caregiver, or Care coordination (not separately reported).     Each patient to whom he or she provides medical services by telemedicine is:  (1) informed of the relationship between the physician and patient and the respective role of any other health care provider with respect to management of the patient; and (2) notified that he or she may decline to receive medical services by telemedicine and may withdraw from such care at any time.    CHIEF COMPLAINT:  Autism      HISTORY OF PRESENTING ILLNESS:  Royal HALLEY Celaya III is a 23 y.o. male with history of Anxiety, ADHD, and Autism spectrum disorder who presents for follow up appointment.      Plan at last appointment on 8/7/2023:  Continue Pristiq 100mg daily, Buspar 5mg BID, and Propranolol to 40mg BID.  He often takes Buspar and Propranolol just once daily when he is not feeling anxious.    Continue hydroxyzine 25mg PRN anxiety.    Restart Ativan 0.5mg PRN panic attack.  Discussed with patient informed consent, risks versus benefits, alternative treatments, side effect profile and the inherent unpredictability of individual responses to these treatments.  The patient expresses understanding of the above and displays the capacity to agree with this current plan.    History as told by patient  "and mother:  Got a place to live - with a roommate.  Their room is then attached to another room with 2 other people.  Not optimal but glad he has a place to live.  Leaving this weekend.  Had a group of friends he sometimes hung out with at [facility].  They were nice but he did feel left out some.  Not sure if he will see this group again - they will be pretty far away from him this year.  He does not talk to people about his autism diagnosis - he is introverted but once he gets to know someone he becomes very talkative and can monopolize the conversation.  He also has realized that he may not have the most understanding or "tact" about what to say.  Someone once told him that he can't talk the same way when girls are present as he may talk around just guys.  He is very interested in autism groups once he is in [place].    Medication side effects:  No  Medication compliance:  Yes    PSYCHIATRIC REVIEW OF SYSTEMS:  Trouble with sleep:  Denies  Appetite changes:  Denies  Weight changes:  Denies  Lack of energy:  Denies  Anhedonia:  Denies  Somatic symptoms:  Denies  Libido:  Not discussed  Anxiety/panic:  Sometimes, not currently  Guilty/hopeless:  Denies  Self-injurious behavior/risky behavior:  Denies  Any drugs:  Denies  Alcohol:  Occasional    MEDICAL REVIEW OF SYSTEMS:  Complete review of systems performed covering Constitutional, Musculoskeletal, Neurologic.  All systems negative except for that covered in HPI.    PAST PSYCHIATRIC, MEDICAL, AND SOCIAL HISTORY REVIEWED  The patient's past medical, family and social history have been reviewed and updated as appropriate within the electronic medical record - see encounter notes.    MEDICATIONS:    Current Outpatient Medications:     busPIRone (BUSPAR) 5 MG Tab, Take 1 tablet (5 mg total) by mouth 2 (two) times daily., Disp: 180 tablet, Rfl: 3    desvenlafaxine succinate (PRISTIQ) 100 MG Tb24, Take 1 tablet (100 mg total) by mouth once daily., Disp: 90 tablet, Rfl: 3    " "hydrOXYzine HCL (ATARAX) 25 MG tablet, TAKE 1 TABLET BY MOUTH 2 TIMES DAILY AS NEEDED FOR ANXIETY., Disp: 180 tablet, Rfl: 1    LORazepam (ATIVAN) 0.5 MG tablet, Take 1 tablet (0.5 mg total) by mouth 2 (two) times daily as needed (Severe anxiety/Panic). (Patient not taking: Reported on 8/11/2023), Disp: 60 tablet, Rfl: 0    propranoloL (INDERAL) 40 MG tablet, Take 1 tablet (40 mg total) by mouth 2 (two) times daily., Disp: 180 tablet, Rfl: 3    ALLERGIES:  Review of patient's allergies indicates:   Allergen Reactions    Pcn [penicillins] Hives       PSYCHIATRIC EXAM:  There were no vitals filed for this visit.    Appearance:  Well groomed, appearing healthy and of stated age  Behavior:  Cooperative, pleasant, no psychomotor agitation or retardation, good eye contact  Speech:  Normal rate, rhythm, prosody, and volume  Mood:  "Good"  Affect:  Flat (chronic)  Thought Process:  Linear, logical, goal directed  Thought Content:  Negative for suicidal ideation, homicidal ideation, delusions or hallucinations.  Associations:  Intact  Memory:  Grossly Intact  Level of Consciousness/Orientation:  Grossly intact  Fund of Knowledge:  Good  Attention:  Good  Language:  Fluent, able to name abstract and concrete objects  Insight:  Fair  Judgment:  Fair  Psychomotor signs:  No involuntary movements of face  Gait:  Unable to assess via virtual visit      RELEVANT LABS/STUDIES:    Lab Results   Component Value Date    WBC 7.67 06/02/2022    HGB 13.1 (L) 06/02/2022    HCT 40.5 06/02/2022    MCV 86 06/02/2022     06/02/2022     BMP  Lab Results   Component Value Date     06/05/2023    K 4.5 06/05/2023     06/05/2023    CO2 27 06/05/2023    BUN 12 06/05/2023    CREATININE 0.9 06/05/2023    CALCIUM 9.9 06/05/2023    ANIONGAP 8 06/05/2023    ESTGFRAFRICA >60.0 06/02/2022    EGFRNONAA >60.0 06/02/2022     Lab Results   Component Value Date    ALT 41 06/05/2023    AST 29 06/05/2023    ALKPHOS 91 06/05/2023    BILITOT 0.4 " 06/05/2023     Lab Results   Component Value Date    TSH 0.805 06/02/2022     Lab Results   Component Value Date    HGBA1C 5.2 06/05/2023       IMPRESSION:    Royal HALLEY Celaya III is a 23 y.o. male with history of Anxiety, ADHD, and Autism spectrum disorder who presents for follow up appointment.      Status/Progress:  Based on the examination today, the patient's problem(s) is/are adequately but not ideally controlled.  New problems have not been presented today.     Risk Parameters:  Patient reports no suicidal ideation  Patient reports no homicidal ideation  Patient reports no self-injurious behavior  Patient reports no violent behavior    DIAGNOSES:    ICD-10-CM ICD-9-CM   1. Autism spectrum disorder  F84.0 299.00   2. Attention deficit hyperactivity disorder (ADHD), combined type  F90.2 314.01   3. Social anxiety disorder  F40.10 300.23   4. Panic attacks  F41.0 300.01       PLAN:  Discussed goal of connecting with Autism group once he is in [place] ([facility] group).  Continue Pristiq 100mg daily, Buspar 5mg BID, and Propranolol to 40mg BID.  He often takes Buspar and Propranolol just once daily when he is not feeling anxious.    Continue hydroxyzine 25mg PRN anxiety and Ativan 0.5mg PRN panic attack.  Discussed with patient informed consent, risks versus benefits, alternative treatments, side effect profile and the inherent unpredictability of individual responses to these treatments.  The patient expresses understanding of the above and displays the capacity to agree with this current plan.    RETURN TO CLINIC:  Follow up in about 4 months (around 12/15/2023). When he is back in [place] over the holidays.

## 2023-08-20 DIAGNOSIS — F41.0 PANIC ATTACKS: ICD-10-CM

## 2023-08-20 DIAGNOSIS — F41.8 PERFORMANCE ANXIETY: ICD-10-CM

## 2023-08-20 RX ORDER — HYDROXYZINE HYDROCHLORIDE 25 MG/1
25 TABLET, FILM COATED ORAL 2 TIMES DAILY PRN
Qty: 180 TABLET | Refills: 1 | Status: SHIPPED | OUTPATIENT
Start: 2023-08-20

## 2023-08-21 ENCOUNTER — PATIENT MESSAGE (OUTPATIENT)
Dept: PSYCHIATRY | Facility: CLINIC | Age: 23
End: 2023-08-21
Payer: COMMERCIAL

## 2023-12-21 ENCOUNTER — HOSPITAL ENCOUNTER (OUTPATIENT)
Dept: RADIOLOGY | Facility: HOSPITAL | Age: 23
Discharge: HOME | End: 2023-12-21
Attending: NURSE PRACTITIONER
Payer: COMMERCIAL

## 2023-12-21 DIAGNOSIS — K76.0 NAFLD (NONALCOHOLIC FATTY LIVER DISEASE): ICD-10-CM

## 2023-12-21 PROCEDURE — 76705 US ABDOMEN LIMITED: ICD-10-PCS | Mod: 26,,, | Performed by: RADIOLOGY

## 2023-12-21 PROCEDURE — 76705 ECHO EXAM OF ABDOMEN: CPT | Mod: TC

## 2023-12-21 PROCEDURE — 76705 ECHO EXAM OF ABDOMEN: CPT | Mod: 26,,, | Performed by: RADIOLOGY

## 2024-02-02 ENCOUNTER — TELEPHONE (OUTPATIENT)
Dept: HEPATOLOGY | Facility: CLINIC | Age: 24
End: 2024-02-02
Payer: COMMERCIAL

## 2024-02-02 NOTE — TELEPHONE ENCOUNTER
Justa's pt   Please contact pt and notify him recent US is stable, needs f/u with Justa in August with fibroscan same day, please schedule     Thanks!

## 2024-02-05 ENCOUNTER — TELEPHONE (OUTPATIENT)
Dept: HEPATOLOGY | Facility: CLINIC | Age: 24
End: 2024-02-05
Payer: COMMERCIAL

## 2024-02-05 NOTE — TELEPHONE ENCOUNTER
Pt was called and his mother answered the phone. Pt agreed to have her schld his appt due to him being away at school. Appt made for FU and FS at [facility] in 6 mths    Debra

## 2024-04-13 DIAGNOSIS — F41.0 PANIC ATTACKS: ICD-10-CM

## 2024-04-13 DIAGNOSIS — F41.8 PERFORMANCE ANXIETY: ICD-10-CM

## 2024-04-16 RX ORDER — HYDROXYZINE HYDROCHLORIDE 25 MG/1
25 TABLET, FILM COATED ORAL 2 TIMES DAILY
Qty: 180 TABLET | Refills: 1 | Status: SHIPPED | OUTPATIENT
Start: 2024-04-16

## 2024-06-10 ENCOUNTER — PATIENT MESSAGE (OUTPATIENT)
Dept: INTERNAL MEDICINE | Facility: CLINIC | Age: 24
End: 2024-06-10
Payer: COMMERCIAL

## 2024-07-19 ENCOUNTER — TELEPHONE (OUTPATIENT)
Dept: HEPATOLOGY | Facility: CLINIC | Age: 24
End: 2024-07-19
Payer: COMMERCIAL

## 2024-07-19 ENCOUNTER — PATIENT MESSAGE (OUTPATIENT)
Dept: HEPATOLOGY | Facility: CLINIC | Age: 24
End: 2024-07-19
Payer: COMMERCIAL

## 2024-07-19 NOTE — TELEPHONE ENCOUNTER
Patient contacted to confirm and reschedule appointments.  No answer, an voicemail was left with the call back .        ----- Message from Debra Curtis MA sent at 7/19/2024  3:09 PM CDT -----  Regarding: FW: Consult/Advisory  Contact: [phone]    ----- Message -----  From: Antonina Downey  Sent: 7/19/2024   2:33 PM CDT  To: [facility] Staff  Subject: Consult/Advisory                                 CONSULT/ADVISORY    Name of Caller: ROYAL ANGELLA J III [[id number]]    Contact Preference:  [phone] (home) [phone] (work)      Nature of Call: Returning a call to [place] confirming his virtual appt on 08/08/2024 and to r/s his Fibroscan.

## 2024-08-15 ENCOUNTER — OFFICE VISIT (OUTPATIENT)
Dept: INTERNAL MEDICINE | Facility: CLINIC | Age: 24
End: 2024-08-15
Payer: COMMERCIAL

## 2024-08-15 VITALS
HEART RATE: 98 BPM | SYSTOLIC BLOOD PRESSURE: 120 MMHG | DIASTOLIC BLOOD PRESSURE: 82 MMHG | OXYGEN SATURATION: 95 % | BODY MASS INDEX: 41.5 KG/M2 | WEIGHT: 289.25 LBS

## 2024-08-15 DIAGNOSIS — F40.10 SOCIAL ANXIETY DISORDER: ICD-10-CM

## 2024-08-15 DIAGNOSIS — E66.01 SEVERE OBESITY (BMI >= 40): ICD-10-CM

## 2024-08-15 DIAGNOSIS — F90.2 ATTENTION DEFICIT HYPERACTIVITY DISORDER (ADHD), COMBINED TYPE: ICD-10-CM

## 2024-08-15 DIAGNOSIS — Z23 NEED FOR PNEUMOCOCCAL 20-VALENT CONJUGATE VACCINATION: ICD-10-CM

## 2024-08-15 DIAGNOSIS — Z00.00 ANNUAL PHYSICAL EXAM: Primary | ICD-10-CM

## 2024-08-15 DIAGNOSIS — R16.0 HEPATOMEGALY: ICD-10-CM

## 2024-08-15 DIAGNOSIS — D22.9 MULTIPLE NEVI: ICD-10-CM

## 2024-08-15 DIAGNOSIS — E55.9 VITAMIN D INSUFFICIENCY: ICD-10-CM

## 2024-08-15 DIAGNOSIS — Z00.00 ENCOUNTER FOR PREVENTIVE CARE: ICD-10-CM

## 2024-08-15 DIAGNOSIS — F84.0 AUTISM SPECTRUM DISORDER: ICD-10-CM

## 2024-08-15 DIAGNOSIS — K76.0 NAFLD (NONALCOHOLIC FATTY LIVER DISEASE): ICD-10-CM

## 2024-08-15 DIAGNOSIS — E88.01 LOW PLASMA ALPHA-1 ANTITRYPSIN: ICD-10-CM

## 2024-08-15 DIAGNOSIS — F41.0 PANIC ATTACKS: ICD-10-CM

## 2024-08-15 PROBLEM — H04.129 TEAR FILM INSUFFICIENCY: Status: RESOLVED | Noted: 2022-06-02 | Resolved: 2024-08-15

## 2024-08-15 PROBLEM — H43.393 VISUAL FLOATERS, BILATERAL: Status: RESOLVED | Noted: 2019-03-04 | Resolved: 2024-08-15

## 2024-08-15 PROBLEM — H52.13 MYOPIA OF BOTH EYES: Status: RESOLVED | Noted: 2022-06-02 | Resolved: 2024-08-15

## 2024-08-15 PROCEDURE — 99999 PR PBB SHADOW E&M-EST. PATIENT-LVL III: CPT | Mod: PBBFAC,,, | Performed by: INTERNAL MEDICINE

## 2024-08-15 RX ORDER — NAPROXEN SODIUM 220 MG/1
1 TABLET ORAL DAILY
COMMUNITY
Start: 2024-08-15

## 2024-08-15 RX ORDER — PROPRANOLOL HYDROCHLORIDE 40 MG/1
40 TABLET ORAL 2 TIMES DAILY
Qty: 180 TABLET | Refills: 3 | Status: SHIPPED | OUTPATIENT
Start: 2024-08-15

## 2024-08-15 RX ORDER — VITAMIN E 268 MG
400 CAPSULE ORAL DAILY
COMMUNITY
Start: 2024-08-15

## 2024-08-15 RX ORDER — DESVENLAFAXINE 100 MG/1
100 TABLET, EXTENDED RELEASE ORAL DAILY
Qty: 90 TABLET | Refills: 3 | Status: SHIPPED | OUTPATIENT
Start: 2024-08-15

## 2024-08-15 RX ORDER — HYDROXYZINE HYDROCHLORIDE 25 MG/1
25 TABLET, FILM COATED ORAL 2 TIMES DAILY
Qty: 180 TABLET | Refills: 3 | Status: SHIPPED | OUTPATIENT
Start: 2024-08-15

## 2024-08-15 RX ORDER — BUSPIRONE HYDROCHLORIDE 5 MG/1
5 TABLET ORAL 2 TIMES DAILY
Qty: 180 TABLET | Refills: 3 | Status: SHIPPED | OUTPATIENT
Start: 2024-08-15

## 2024-08-15 NOTE — PATIENT INSTRUCTIONS
Omega-3 Fish Oil  Take 1 daily.   [employer] or any other brand.      Vitamin E 400 IU  Take 1 daily  [employer] or any other brand.

## 2024-08-15 NOTE — PROGRESS NOTES
INTERNAL MEDICINE CLINIC  Follow-up Visit Progress Note     PRESENTING HISTORY     PCP: Andrey.    Current Chief Complaint/Problem:    Chief Complaint   Patient presents with    Letter for School/Work      History of Present Illness & ROS: Mr. Royal HALLEY Celaya III is a 24 y.o. male.    Review of Systems:  Review of Systems   Constitutional:  Negative for chills and fever.   Respiratory:  Negative for cough and shortness of breath.    Cardiovascular:  Negative for chest pain and leg swelling.   Gastrointestinal:  Negative for abdominal pain.   Musculoskeletal:  Negative for joint pain.   Skin:  Negative for rash.   Neurological:  Negative for headaches.   Psychiatric/Behavioral:  Negative for depression and suicidal ideas. The patient does not have insomnia.        PAST HISTORY:     Past Medical History:   Diagnosis Date    Attention deficit hyperactivity disorder (ADHD), combined type 11/23/2021    Autism spectrum disorder 11/23/2021    Constipation - functional     Hepatomegaly 03/13/2019    Low plasma alpha-1 antitrypsin 11/25/2019    Phenotype MZ      Multiple nevi     Myopia of both eyes 06/02/2022    NAFLD (nonalcoholic fatty liver disease) 11/25/2019    Panic attacks 11/23/2021    Severe obesity (BMI >= 40) 05/28/2020    Social anxiety disorder     Tear film insufficiency 06/02/2022    Visual floaters, bilateral 03/04/2019       Past Surgical History:   Procedure Laterality Date    HERNIA REPAIR  2000    hydrocelectomy    TESTICLE SURGERY  2001    hydrocele       Family History   Problem Relation Name Age of Onset    [place] Father FATHER     Hyperlipidemia Father FATHER     Hypertension Father FATHER     Mental illness Father FATHER     Alcohol abuse Father FATHER     Asthma Sister SISTER     Blindness Maternal Grandmother GRANDMOTHER     Glaucoma Maternal Grandmother GRANDMOTHER     Alzheimer's disease Maternal Grandmother GRANDMOTHER     Vision loss Maternal Grandmother GRANDMOTHER     Cataracts Maternal  Grandfather GRANDFATHER     Cancer Maternal Grandfather GRANDFATHER         SKIN CA    Heart disease Paternal Grandmother GRANDMOTHER     Depression Paternal Grandmother GRANDMOTHER     Mental illness Paternal Grandmother GRANDMOTHER     Anxiety disorder Paternal Grandfather GRANDFATHER     Cancer Paternal Grandfather GRANDFATHER         prostate CA    Amblyopia Neg Hx      Diabetes Neg Hx      Retinal detachment Neg Hx      Strabismus Neg Hx      Cirrhosis Neg Hx         Social History     Socioeconomic History    Marital status: Single   Tobacco Use    Smoking status: Passive Smoke Exposure - Never Smoker     Passive exposure: Yes    Smokeless tobacco: Never    Tobacco comments:     Dad smokes a pipe   Substance and Sexual Activity    Alcohol use: Not Currently    Drug use: No    Sexual activity: Never   Social History Narrative    Going to [employer] for [employer] 8-2024.    He plays bass.        Single                 Social Determinants of Health     Financial Resource Strain: Medium Risk (8/15/2024)    Overall Financial Resource Strain (CARDIA)     Difficulty of Paying Living Expenses: Somewhat hard   Food Insecurity: Patient Declined (8/15/2024)    Hunger Vital Sign     Worried About Running Out of Food in the Last Year: Patient declined     Ran Out of Food in the Last Year: Patient declined   Transportation Needs: No Transportation Needs (8/12/2023)    PRAPARE - Transportation     Lack of Transportation (Medical): No     Lack of Transportation (Non-Medical): No   Physical Activity: Insufficiently Active (8/15/2024)    Exercise Vital Sign     Days of Exercise per Week: 1 day     Minutes of Exercise per Session: 30 min   Stress: Stress Concern Present (8/15/2024)    [employer] [place] [employer] - Occupational Stress Questionnaire     Feeling of Stress : To some extent   Housing Stability: Unknown (8/15/2024)    Housing Stability Vital Sign     Unable to Pay for Housing in the Last Year: Patient  declined       MEDICATIONS & ALLERGIES:     Current Outpatient Medications on File Prior to Visit   Medication Sig Dispense Refill    busPIRone (BUSPAR) 5 MG Tab Take 1 tablet (5 mg total) by mouth 2 (two) times daily. 180 tablet 3    desvenlafaxine succinate (PRISTIQ) 100 MG Tb24 Take 1 tablet (100 mg total) by mouth once daily. 90 tablet 3    hydrOXYzine HCL (ATARAX) 25 MG tablet TAKE 1 TABLET BY MOUTH TWICE A DAY AS NEEDED FOR ANXIETY 180 tablet 1    propranoloL (INDERAL) 40 MG tablet Take 1 tablet (40 mg total) by mouth 2 (two) times daily. 180 tablet 3       Review of patient's allergies indicates:   Allergen Reactions    Pcn [penicillins] Hives       Medications Reconciliation:   I have reconciled the patient's home medications and discharge medications with the patient/family. I have updated all changes.  Refer to After-Visit Medication List.    OBJECTIVE:     Vital Signs:  Vitals:    08/15/24 1656   BP: 120/82   Pulse: 98     Wt Readings from Last 3 Encounters:   08/15/24 1656 131.2 kg (289 lb 3.9 oz)   08/11/23 1522 127.5 kg (281 lb 1.4 oz)   08/07/23 1001 125.5 kg (276 lb 10.8 oz)     Body mass index is 41.5 kg/m².     Physical Exam:  General: Well developed, well nourished. No distress.  HEENT: Head is normocephalic, atraumatic; ears are normal.    Eyes: Clear conjunctiva.  Neck: Supple, symmetrical neck; trachea midline.  Lungs: Clear to auscultation bilaterally and normal respiratory effort.  Cardiovascular: Heart with regular rate and rhythm.    Extremities: No LE edema.    Abdomen: Abdomen is soft, non-tender non-distended with normal bowel sounds.  Musculoskeletal: Normal gait.   Skin: multiple nevi on body (benign looking)  Genital:  Normal penis.    No rash in the genital area.  Scrotum and epididymis normal. No inguinal hernia.  No inguinal nodes.   Rectal: No perianal lesions or rash. Digital exam: deferred.   Lymph Nodes: No cervical, supraclavicular or axillary adenopathy.  Psychiatric: Normal  affect. Alert.    Laboratory  Lab Results   Component Value Date    WBC 7.67 06/02/2022    HGB 13.1 (L) 06/02/2022    HCT 40.5 06/02/2022     06/02/2022    CHOL 209 (H) 06/05/2023    TRIG 140 06/05/2023    HDL 66 06/05/2023    ALT 41 06/05/2023    AST 29 06/05/2023     06/05/2023    K 4.5 06/05/2023     06/05/2023    CREATININE 0.9 06/05/2023    BUN 12 06/05/2023    CO2 27 06/05/2023    TSH 0.805 06/02/2022    HGBA1C 5.2 06/05/2023       ASSESSMENT & PLAN:     Annual physical exam  Encounter for preventive care  - Reviewed and updated past and current medical problems.  Discussed treatment of current medical problems      Completed Daniel PE form.    -     Lipid Panel; Future; Expected date: 08/15/2024  -     CBC Auto Differential; Future; Expected date: 08/15/2024  -     Comprehensive Metabolic Panel; Future; Expected date: 08/15/2024  -     TSH; Future; Expected date: 08/15/2024  -     Hemoglobin A1C; Future; Expected date: 08/15/2024  -     Vitamin D; Future; Expected date: 02/11/2025      Autism spectrum disorder  Panic attacks  Social anxiety disorder  Attention deficit hyperactivity disorder (ADHD), combined type  Psych 8-  Discussed goal of connecting with Autism group once he is in [place] ([facility] group).  Continue Pristiq 100mg daily, Buspar 5mg BID, and Propranolol to 40mg BID.  He often takes Buspar and Propranolol just once daily when he is not feeling anxious.    Continue hydroxyzine 25mg PRN anxiety and Ativan 0.5mg PRN panic attack.  Discussed with patient informed consent, risks versus benefits, alternative treatments, side effect profile and the inherent unpredictability of individual responses to these treatments.  The patient expresses understanding of the above and displays the capacity to agree with this current plan.        Refilled:  -     busPIRone (BUSPAR) 5 MG Tab; Take 1 tablet (5 mg total) by mouth 2 (two) times daily.  Dispense: 180 tablet;  Refill: 3  -     desvenlafaxine succinate (PRISTIQ) 100 MG Tb24; Take 1 tablet (100 mg total) by mouth once daily.  Dispense: 90 tablet; Refill: 3  -     hydrOXYzine HCL (ATARAX) 25 MG tablet; Take 1 tablet (25 mg total) by mouth 2 (two) times daily.  Dispense: 180 tablet; Refill: 3  -     propranoloL (INDERAL) 40 MG tablet; Take 1 tablet (40 mg total) by mouth 2 (two) times daily.  Dispense: 180 tablet; Refill: 3           He will need to make follow up with Psych when he returns to [facility].    NAFLD (nonalcoholic fatty liver disease)  Hepatomegaly  Low plasma alpha-1 antitrypsin    Hepatology:  NAFLD with possible hepatic fibrosis  -- Labs, imaging, and prior fibrosis staging reviewed. Liver enzymes have normalized with weight loss.  -- Last Fibroscan with significant steatosis but also suggestive of moderate fibrosis (F2), will reassess later this year.  -- Continue abd US every 1-2 years  -- Reminded that the only treatment for fatty liver is weight loss and maintaining good control of metabolic risk factors (blood pressure, cholesterol, and blood sugar).  -- Ineligible for ESTES clinical trials due to A1AT deficiency    A1AT phenotype MZ, low A1AT level  -- No respiratory symptoms though recommend consult w/ Pulmonology     Last  Liver 8-2023: Hepatic Steatosis    Rx:  -     omega 3-dha-epa-fish oil 1,200 (144-216) mg Cap; Take 1 capsule by mouth once daily.  -     vitamin E 400 UNIT capsule; Take 1 capsule (400 Units total) by mouth once daily.    Multiple nevi  - Followed by Dermatology.    Vitamin D insufficieny  Rx Vitamin D3 5000 IU daily.    Severe obesity (BMI >= 40)  Today: Body mass index is 41.5 kg/m².    - Discussed weight loss for treatment of fatty liver disease.    Preventive Health Maintenance:    Need for pneumococcal 20-valent conjugate vaccination  -     (VFC) PCV20 (Prevnar 20) IM vaccine (>/= 6 wks)      Return to Clinic for Follow Up with me:   1 year.    Scheduled Follow-up :  Future  Appointments   Date Time Provider Department Center   8/16/2024 11:10 AM LAB, APPOINTMENT [facility] LAB IM Manuel Kirk PCW         After Visit Medication List :     Medication List            Accurate as of August 15, 2024  5:26 PM. If you have any questions, ask your nurse or doctor.                START taking these medications      omega 3-dha-epa-fish oil 1,200 (144-216) mg Cap  Take 1 capsule by mouth once daily.  Started by: Ayaan Smith MD     vitamin E 400 UNIT capsule  Take 1 capsule (400 Units total) by mouth once daily.  Started by: Ayaan Smith MD            CONTINUE taking these medications      busPIRone 5 MG Tab  Commonly known as: BUSPAR  Take 1 tablet (5 mg total) by mouth 2 (two) times daily.     desvenlafaxine succinate 100 MG Tb24  Commonly known as: PRISTIQ  Take 1 tablet (100 mg total) by mouth once daily.     hydrOXYzine HCL 25 MG tablet  Commonly known as: ATARAX  Take 1 tablet (25 mg total) by mouth 2 (two) times daily.     propranoloL 40 MG tablet  Commonly known as: INDERAL  Take 1 tablet (40 mg total) by mouth 2 (two) times daily.            STOP taking these medications      LORazepam 0.5 MG tablet  Commonly known as: ATIVAN  Stopped by: Ayaan Smith MD               Where to Get Your Medications        These medications were sent to [facility]/pharmacy #[id number] - [place] - [address]  [address] [facility] [address]      Phone: [phone]   busPIRone 5 MG Tab  desvenlafaxine succinate 100 MG Tb24  hydrOXYzine HCL 25 MG tablet  propranoloL 40 MG tablet       You can get these medications from any pharmacy    You don't need a prescription for these medications  omega 3-dha-epa-fish oil 1,200 (144-216) mg Cap  vitamin E 400 UNIT capsule         Signing Physician:  Ayaan Smith MD

## 2024-08-16 ENCOUNTER — PATIENT MESSAGE (OUTPATIENT)
Dept: INTERNAL MEDICINE | Facility: CLINIC | Age: 24
End: 2024-08-16
Payer: COMMERCIAL

## 2024-08-16 ENCOUNTER — LAB VISIT (OUTPATIENT)
Dept: LAB | Facility: HOSPITAL | Age: 24
End: 2024-08-16
Attending: INTERNAL MEDICINE
Payer: COMMERCIAL

## 2024-08-16 DIAGNOSIS — K76.0 NAFLD (NONALCOHOLIC FATTY LIVER DISEASE): ICD-10-CM

## 2024-08-16 DIAGNOSIS — Z00.00 ANNUAL PHYSICAL EXAM: ICD-10-CM

## 2024-08-16 DIAGNOSIS — E66.01 SEVERE OBESITY (BMI >= 40): ICD-10-CM

## 2024-08-16 PROBLEM — E55.9 VITAMIN D INSUFFICIENCY: Status: ACTIVE | Noted: 2024-08-16

## 2024-08-16 LAB
25(OH)D3+25(OH)D2 SERPL-MCNC: 21 NG/ML (ref 30–96)
ALBUMIN SERPL BCP-MCNC: 4.2 G/DL (ref 3.5–5.2)
ALP SERPL-CCNC: 87 U/L (ref 55–135)
ALT SERPL W/O P-5'-P-CCNC: 37 U/L (ref 10–44)
ANION GAP SERPL CALC-SCNC: 12 MMOL/L (ref 8–16)
AST SERPL-CCNC: 21 U/L (ref 10–40)
BASOPHILS # BLD AUTO: 0.06 K/UL (ref 0–0.2)
BASOPHILS NFR BLD: 0.6 % (ref 0–1.9)
BILIRUB SERPL-MCNC: 0.4 MG/DL (ref 0.1–1)
BUN SERPL-MCNC: 13 MG/DL (ref 6–20)
CALCIUM SERPL-MCNC: 10.3 MG/DL (ref 8.7–10.5)
CHLORIDE SERPL-SCNC: 105 MMOL/L (ref 95–110)
CHOLEST SERPL-MCNC: 165 MG/DL (ref 120–199)
CHOLEST/HDLC SERPL: 2.8 {RATIO} (ref 2–5)
CO2 SERPL-SCNC: 24 MMOL/L (ref 23–29)
CREAT SERPL-MCNC: 0.9 MG/DL (ref 0.5–1.4)
DIFFERENTIAL METHOD BLD: ABNORMAL
EOSINOPHIL # BLD AUTO: 0.2 K/UL (ref 0–0.5)
EOSINOPHIL NFR BLD: 1.9 % (ref 0–8)
ERYTHROCYTE [DISTWIDTH] IN BLOOD BY AUTOMATED COUNT: 13.2 % (ref 11.5–14.5)
EST. GFR  (NO RACE VARIABLE): >60 ML/MIN/1.73 M^2
ESTIMATED AVG GLUCOSE: 105 MG/DL (ref 68–131)
GLUCOSE SERPL-MCNC: 91 MG/DL (ref 70–110)
HBA1C MFR BLD: 5.3 % (ref 4–5.6)
HCT VFR BLD AUTO: 42.3 % (ref 40–54)
HDLC SERPL-MCNC: 59 MG/DL (ref 40–75)
HDLC SERPL: 35.8 % (ref 20–50)
HGB BLD-MCNC: 13.5 G/DL (ref 14–18)
IMM GRANULOCYTES # BLD AUTO: 0.07 K/UL (ref 0–0.04)
IMM GRANULOCYTES NFR BLD AUTO: 0.8 % (ref 0–0.5)
LDLC SERPL CALC-MCNC: 91 MG/DL (ref 63–159)
LYMPHOCYTES # BLD AUTO: 2.4 K/UL (ref 1–4.8)
LYMPHOCYTES NFR BLD: 25.9 % (ref 18–48)
MCH RBC QN AUTO: 27.8 PG (ref 27–31)
MCHC RBC AUTO-ENTMCNC: 31.9 G/DL (ref 32–36)
MCV RBC AUTO: 87 FL (ref 82–98)
MONOCYTES # BLD AUTO: 0.9 K/UL (ref 0.3–1)
MONOCYTES NFR BLD: 9.9 % (ref 4–15)
NEUTROPHILS # BLD AUTO: 5.6 K/UL (ref 1.8–7.7)
NEUTROPHILS NFR BLD: 60.9 % (ref 38–73)
NONHDLC SERPL-MCNC: 106 MG/DL
NRBC BLD-RTO: 0 /100 WBC
PLATELET # BLD AUTO: 345 K/UL (ref 150–450)
PMV BLD AUTO: 10 FL (ref 9.2–12.9)
POTASSIUM SERPL-SCNC: 4.7 MMOL/L (ref 3.5–5.1)
PROT SERPL-MCNC: 7.6 G/DL (ref 6–8.4)
RBC # BLD AUTO: 4.86 M/UL (ref 4.6–6.2)
SODIUM SERPL-SCNC: 141 MMOL/L (ref 136–145)
TRIGL SERPL-MCNC: 75 MG/DL (ref 30–150)
TSH SERPL DL<=0.005 MIU/L-ACNC: 1.19 UIU/ML (ref 0.4–4)
WBC # BLD AUTO: 9.25 K/UL (ref 3.9–12.7)

## 2024-08-16 PROCEDURE — 83036 HEMOGLOBIN GLYCOSYLATED A1C: CPT | Performed by: INTERNAL MEDICINE

## 2024-08-16 PROCEDURE — 80053 COMPREHEN METABOLIC PANEL: CPT | Performed by: INTERNAL MEDICINE

## 2024-08-16 PROCEDURE — 36415 COLL VENOUS BLD VENIPUNCTURE: CPT | Performed by: INTERNAL MEDICINE

## 2024-08-16 PROCEDURE — 85025 COMPLETE CBC W/AUTO DIFF WBC: CPT | Performed by: INTERNAL MEDICINE

## 2024-08-16 PROCEDURE — 82306 VITAMIN D 25 HYDROXY: CPT | Performed by: INTERNAL MEDICINE

## 2024-08-16 PROCEDURE — 80061 LIPID PANEL: CPT | Performed by: INTERNAL MEDICINE

## 2024-08-16 PROCEDURE — 84443 ASSAY THYROID STIM HORMONE: CPT | Performed by: INTERNAL MEDICINE

## 2024-08-16 RX ORDER — ACETAMINOPHEN 500 MG
5000 TABLET ORAL DAILY
COMMUNITY
Start: 2024-08-16

## 2024-08-29 ENCOUNTER — OFFICE VISIT (OUTPATIENT)
Dept: PSYCHIATRY | Facility: CLINIC | Age: 24
End: 2024-08-29
Payer: COMMERCIAL

## 2024-08-29 DIAGNOSIS — F40.10 SOCIAL ANXIETY DISORDER: Primary | ICD-10-CM

## 2024-08-29 DIAGNOSIS — F84.0 AUTISM SPECTRUM DISORDER: ICD-10-CM

## 2024-08-29 DIAGNOSIS — F41.0 PANIC ATTACKS: ICD-10-CM

## 2024-08-29 PROCEDURE — 3044F HG A1C LEVEL LT 7.0%: CPT | Mod: CPTII,95,, | Performed by: INTERNAL MEDICINE

## 2024-08-29 PROCEDURE — 1159F MED LIST DOCD IN RCRD: CPT | Mod: CPTII,95,, | Performed by: INTERNAL MEDICINE

## 2024-08-29 PROCEDURE — 1160F RVW MEDS BY RX/DR IN RCRD: CPT | Mod: CPTII,95,, | Performed by: INTERNAL MEDICINE

## 2024-08-29 PROCEDURE — 99214 OFFICE O/P EST MOD 30 MIN: CPT | Mod: 95,,, | Performed by: INTERNAL MEDICINE

## 2024-08-29 RX ORDER — BUSPIRONE HYDROCHLORIDE 5 MG/1
5 TABLET ORAL 2 TIMES DAILY
Qty: 180 TABLET | Refills: 3 | Status: SHIPPED | OUTPATIENT
Start: 2024-08-29

## 2024-08-29 RX ORDER — DESVENLAFAXINE 100 MG/1
100 TABLET, EXTENDED RELEASE ORAL DAILY
Qty: 90 TABLET | Refills: 3 | Status: SHIPPED | OUTPATIENT
Start: 2024-08-29

## 2024-08-29 RX ORDER — PROPRANOLOL HYDROCHLORIDE 40 MG/1
40 TABLET ORAL 2 TIMES DAILY
Qty: 180 TABLET | Refills: 3 | Status: SHIPPED | OUTPATIENT
Start: 2024-08-29

## 2024-08-29 NOTE — PROGRESS NOTES
OUTPATIENT PSYCHIATRY RETURN VISIT    ENCOUNTER DATE:  9/3/2024  SITE:  [facility]  LENGTH OF SESSION:  11 minutes    The patient location is:  [place], not in a healthcare facility  Visit type:  audiovisual    Face to Face time with patient:  11 minutes  16 minutes of total time spent on the encounter, which includes face to face time and non-face to face time preparing to see the patient (eg, review of tests), Obtaining and/or reviewing separately obtained history, Documenting clinical information in the electronic or other health record, Independently interpreting results (not separately reported) and communicating results to the patient/family/caregiver, or Care coordination (not separately reported).     Each patient to whom he or she provides medical services by telemedicine is:  (1) informed of the relationship between the physician and patient and the respective role of any other health care provider with respect to management of the patient; and (2) notified that he or she may decline to receive medical services by telemedicine and may withdraw from such care at any time.    CHIEF COMPLAINT:  Follow-up and Anxiety      HISTORY OF PRESENTING ILLNESS:  Royal HALLEY Celaya III is a 24 y.o. male with history of Anxiety, ADHD, and Autism spectrum disorder who presents for follow up appointment.      Plan at last appointment on 8/15/2023:  Discussed goal of connecting with Autism group once he is in [place] ([facility] group).  Continue Pristiq 100mg daily, Buspar 5mg BID, and Propranolol to 40mg BID.  He often takes Buspar and Propranolol just once daily when he is not feeling anxious.    Continue hydroxyzine 25mg PRN anxiety and Ativan 0.5mg PRN panic attack.  Discussed with patient informed consent, risks versus benefits, alternative treatments, side effect profile and the inherent unpredictability of individual responses to these treatments.  The patient expresses  understanding of the above and displays the capacity to agree with this current plan.    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  This last year's program was fine.  Got to take lessons with a teacher that was very helpful.  A lot of interesting concerts all over [place] as well.  A lot of nice people too.  No serious mood episodes.  One concert he was nervous for but hydroxyzine helped.  Still taking Pristiq, Propranolol, and Buspar just in the morning.  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  A good friend he plans to visit in the city.  He will be going to [employer] - in a [place] town called [place].  Has met one of his roommates - he is nice.  Self-care has been really good over the last year - he has been more aware of that in last year.  Lived in a suite with 12 other guys.      Medication side effects:  No  Medication compliance:  Yes    PSYCHIATRIC REVIEW OF SYSTEMS:  Trouble with sleep:  Denies  Appetite changes:  Denies  Weight changes:  Denies  Lack of energy:  Denies  Anhedonia:  Denies  Somatic symptoms:  Denies  Libido:  Not discussed  Anxiety/panic:  Sometimes, not currently  Guilty/hopeless:  Denies  Self-injurious behavior/risky behavior:  Denies  Any drugs:  Denies  Alcohol:  Occasional    MEDICAL REVIEW OF SYSTEMS:  Complete review of systems performed covering Constitutional, Musculoskeletal, Neurologic.  All systems negative except for that covered in HPI.    PAST PSYCHIATRIC, MEDICAL, AND SOCIAL HISTORY REVIEWED  The patient's past medical, family and social history have been reviewed and updated as appropriate within the electronic medical record - see encounter notes.    MEDICATIONS:    Current Outpatient Medications:     busPIRone (BUSPAR) 5 MG Tab, Take 1 tablet (5 mg total) by mouth 2 (two) times daily., Disp: 180 tablet, Rfl: 3    cholecalciferol, vitamin D3, 125 mcg (5,000  "unit) Tab, Take 1 tablet (5,000 Units total) by mouth once daily., Disp: , Rfl:     desvenlafaxine succinate (PRISTIQ) 100 MG Tb24, Take 1 tablet (100 mg total) by mouth once daily., Disp: 90 tablet, Rfl: 3    hydrOXYzine HCL (ATARAX) 25 MG tablet, Take 1 tablet (25 mg total) by mouth 2 (two) times daily., Disp: 180 tablet, Rfl: 3    omega 3-dha-epa-fish oil 1,200 (144-216) mg Cap, Take 1 capsule by mouth once daily., Disp: , Rfl:     propranoloL (INDERAL) 40 MG tablet, Take 1 tablet (40 mg total) by mouth 2 (two) times daily., Disp: 180 tablet, Rfl: 3    vitamin E 400 UNIT capsule, Take 1 capsule (400 Units total) by mouth once daily., Disp: , Rfl:     ALLERGIES:  Review of patient's allergies indicates:   Allergen Reactions    Pcn [penicillins] Hives       PSYCHIATRIC EXAM:  There were no vitals filed for this visit.    Appearance:  Well groomed, appearing healthy and of stated age  Behavior:  Cooperative, pleasant, no psychomotor agitation or retardation, good eye contact  Speech:  Normal rate, rhythm, prosody, and volume  Mood:  "Good"  Affect:  Flat (chronic)  Thought Process:  Linear, logical, goal directed  Thought Content:  Negative for suicidal ideation, homicidal ideation, delusions or hallucinations.  Associations:  Intact  Memory:  Grossly Intact  Level of Consciousness/Orientation:  Grossly intact  Fund of Knowledge:  Good  Attention:  Good  Language:  Fluent, able to name abstract and concrete objects  Insight:  Fair  Judgment:  Fair  Psychomotor signs:  No involuntary movements of face  Gait:  Unable to assess via virtual visit      RELEVANT LABS/STUDIES:    Lab Results   Component Value Date    WBC 9.25 08/16/2024    HGB 13.5 (L) 08/16/2024    HCT 42.3 08/16/2024    MCV 87 08/16/2024     08/16/2024     BMP  Lab Results   Component Value Date     08/16/2024    K 4.7 08/16/2024     08/16/2024    CO2 24 08/16/2024    BUN 13 08/16/2024    CREATININE 0.9 08/16/2024    CALCIUM 10.3 " 08/16/2024    ANIONGAP 12 08/16/2024    ESTGFRAFRICA >60.0 06/02/2022    EGFRNONAA >60.0 06/02/2022     Lab Results   Component Value Date    ALT 37 08/16/2024    AST 21 08/16/2024    ALKPHOS 87 08/16/2024    BILITOT 0.4 08/16/2024     Lab Results   Component Value Date    TSH 1.190 08/16/2024     Lab Results   Component Value Date    HGBA1C 5.3 08/16/2024       IMPRESSION:    Royal HALLEY Celaya III is a 24 y.o. male with history of Anxiety, ADHD, and Autism spectrum disorder who presents for follow up appointment.      Status/Progress:  Based on the examination today, the patient's problem(s) is/are well controlled.  New problems have not been presented today.    Risk Parameters:  Patient reports no suicidal ideation  Patient reports no homicidal ideation  Patient reports no self-injurious behavior  Patient reports no violent behavior      DIAGNOSES:    ICD-10-CM ICD-9-CM   1. Social anxiety disorder  F40.10 300.23   2. Panic attacks  F41.0 300.01   3. Autism spectrum disorder  F84.0 299.00         PLAN:  Continue Pristiq 100mg daily, Buspar 5mg daily, and Propranolol 40mg daily.  Discussed that he can increase to BID when feeling anxious.     Continue hydroxyzine 25mg PRN anxiety and Ativan 0.5mg PRN panic attack.  Discussed with patient informed consent, risks versus benefits, alternative treatments, side effect profile and the inherent unpredictability of individual responses to these treatments.  The patient expresses understanding of the above and displays the capacity to agree with this current plan.    RETURN TO CLINIC:  Follow up in about 6 months (around 2/28/2025). When he is back in [place] over the holidays.

## 2024-12-19 ENCOUNTER — OFFICE VISIT (OUTPATIENT)
Dept: PSYCHIATRY | Facility: CLINIC | Age: 24
End: 2024-12-19
Payer: COMMERCIAL

## 2024-12-19 DIAGNOSIS — F42.8 OBSESSIVE THINKING: ICD-10-CM

## 2024-12-19 DIAGNOSIS — F90.2 ATTENTION DEFICIT HYPERACTIVITY DISORDER (ADHD), COMBINED TYPE: ICD-10-CM

## 2024-12-19 DIAGNOSIS — F41.0 PANIC ATTACKS: ICD-10-CM

## 2024-12-19 DIAGNOSIS — F40.10 SOCIAL ANXIETY DISORDER: Primary | ICD-10-CM

## 2024-12-19 DIAGNOSIS — F84.0 AUTISM SPECTRUM DISORDER: ICD-10-CM

## 2024-12-19 PROCEDURE — 3044F HG A1C LEVEL LT 7.0%: CPT | Mod: CPTII,95,, | Performed by: INTERNAL MEDICINE

## 2024-12-19 PROCEDURE — 1159F MED LIST DOCD IN RCRD: CPT | Mod: CPTII,95,, | Performed by: INTERNAL MEDICINE

## 2024-12-19 PROCEDURE — 1160F RVW MEDS BY RX/DR IN RCRD: CPT | Mod: CPTII,95,, | Performed by: INTERNAL MEDICINE

## 2024-12-19 PROCEDURE — 99214 OFFICE O/P EST MOD 30 MIN: CPT | Mod: 95,,, | Performed by: INTERNAL MEDICINE

## 2024-12-19 RX ORDER — BUSPIRONE HYDROCHLORIDE 10 MG/1
10 TABLET ORAL 2 TIMES DAILY
Qty: 270 TABLET | Refills: 3 | Status: SHIPPED | OUTPATIENT
Start: 2024-12-19
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
